# Patient Record
Sex: MALE | Race: WHITE | NOT HISPANIC OR LATINO | URBAN - METROPOLITAN AREA
[De-identification: names, ages, dates, MRNs, and addresses within clinical notes are randomized per-mention and may not be internally consistent; named-entity substitution may affect disease eponyms.]

---

## 2018-07-09 ENCOUNTER — TRANSFERRED RECORDS (OUTPATIENT)
Dept: HEALTH INFORMATION MANAGEMENT | Facility: CLINIC | Age: 58
End: 2018-07-09

## 2018-07-16 ENCOUNTER — TELEPHONE (OUTPATIENT)
Dept: NEUROLOGY | Facility: CLINIC | Age: 58
End: 2018-07-16

## 2018-07-29 PROBLEM — Z86.2: Status: ACTIVE | Noted: 2018-07-29

## 2018-07-29 PROBLEM — K21.9 ESOPHAGEAL REFLUX: Status: ACTIVE | Noted: 2018-07-29

## 2018-07-29 PROBLEM — G20.A1 PARKINSON DISEASE (H): Status: ACTIVE | Noted: 2018-07-29

## 2018-07-29 RX ORDER — BISACODYL 5 MG/1
TABLET, DELAYED RELEASE ORAL
Qty: 60 TABLET | COMMUNITY
Start: 2018-07-29 | End: 2018-08-10

## 2018-07-29 RX ORDER — ROPINIROLE 2 MG/1
TABLET, FILM COATED ORAL
COMMUNITY
Start: 2018-07-29 | End: 2018-08-17

## 2018-07-29 RX ORDER — ALPRAZOLAM 0.25 MG
TABLET ORAL
Qty: 90 TABLET | COMMUNITY
Start: 2018-07-29 | End: 2018-08-17

## 2018-08-10 RX ORDER — BISACODYL 5 MG/1
TABLET, DELAYED RELEASE ORAL
Qty: 60 TABLET | COMMUNITY
Start: 2018-08-10 | End: 2018-08-17

## 2018-08-10 RX ORDER — CARBIDOPA AND LEVODOPA 25; 100 MG/1; MG/1
TABLET ORAL
Qty: 360 TABLET | Refills: 3 | COMMUNITY
Start: 2018-08-10 | End: 2018-08-17

## 2018-08-10 NOTE — PROGRESS NOTES
Summary and Recommendations:     Parkinson's disease 56 yo right handed man with right side onset duration 12 yrs  Wearing off issues - improvement would be a goal for the left brain dbs surgery to address right body symptoms and wearing off, etc.     Has other medical issues including Von Willenbrand disease    Visit today with Rosalba Marcelino, Pharmacist - will review issues of ICD, wearing off issues with rytary, etc.     Visited with Emmanuelle Joseph RN of neurosurgery     720am with Jade Bella   10am with Dr. Cardozo   8am with Dr. Marsh neuropsychological evaluation 4 hours appointment  3T MRI scan date and time not certain -location is in the [facility] building   Hematology appointment needs to be set up.     He should get a fasting blood sugar screen for diabetes.     Chucky Warner MD  _____________________________________________________________________  PATIENT: Dion Villavicencio  57 year old male   : 1960  MARY: 2018    Consult requested by Dr. Rg        Outside records reviewed and revealed - inserted.   History obtained from patient  History of Present Illness  56 yo right handed man with parkinson's disease since     Von Willenbrand factor  Father with this condition as well as one of his two sons  No family history of parkinson    No history of head injuries  Has loss of taste about 6 months ago and then it returned.   He has wears cpap for his sleep apnea 28/30 days use for at least 4 hours.   He may not talk in his sleep  His wife talks in her sleep and had a cpap and it did not go well.   Denies problems with his eyes other than dryness of his eyes and wears glasses  He lives in [place] - he is working 10 hours per week at the [employer].  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    does not have an exercise program and is doing raking an mowing  He denies falls or balance problems  He may have freezing when he is off from his medications  He denies skin cancer   Father had skin cancer.   He denies endo problems except had slightly elevated a1c  No lung problems  Has not had low or high blood pressure.   He has the the blood disorder   msk - denies  Mood is good  Bladder is okay but has some urinary urgency. He has not had significant nocturia.   He has some problems during the day especially when he is less medicated  Denies constipation or heartburn.    Onset of parkinson marc 2006.   He had problems with lettting go of a baseball when he was trying to throw it and had wiggling of the right pinky.  Right side is more affected and both legs affected.   He had been on a medication prior to requip and had strange urges to do things. He has not had these problems with requip. He can focus on problems for a long time. He can sit at a computer for hours.   He has not had urges to eat. He has gained. Weight.   He had problems with mirapex.   He had wearing off at 4 hours and still having it with rytary and it is not as bad as before.   He has some dyskinesias when he is thinking and at the computer.   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   He has had okay balance -     He has been exposed to pesticide in [employer].     GOALS for surgery  1. To improve wearing off  - he has been limited in his social activity due to the wearing off and due to the unpredictable nature of his medications    [facility]  Seen Dr. Kirby Fierro at [facility]  Dr. Douglas العلي recommended    He has done the dbs class at [place]  He is aware of the different devices for  surgery.       Medications     7-830am 11am-1230pm 2pm 3-430pm 7pm-830pm 9pm 11pm-1230am   Alprazolam xanax 0.25mg Not taking         Amantadine symmetrel 100mg  1        Bisacodyl dulcolax 5mg Not taking         Carbidopa/levodopa sinemet 25/100 1  As needed 1    1   Carbidopa/levodopa ER rytary 145mg 4 4  4 2  2   mvi centrum  1         Ropinirole requip 2mg 1   1   1   Vitamin D3 2000 units 1                                           Answers for HPI/ROS submitted by the patient on 8/17/2018   General Symptoms: No  Skin Symptoms: No  HENT Symptoms: No  EYE SYMPTOMS: Yes  HEART SYMPTOMS: No  LUNG SYMPTOMS: No  INTESTINAL SYMPTOMS: No  URINARY SYMPTOMS: No  REPRODUCTIVE SYMPTOMS: No  SKELETAL SYMPTOMS: No  BLOOD SYMPTOMS: No  NERVOUS SYSTEM SYMPTOMS: No  MENTAL HEALTH SYMPTOMS: No  Dry eyes: Yes  Redness: Yes  Eye irritation: Yes  PHQ-2 Score: 0        14 Review of systems  are negative except for   Patient Active Problem List   Diagnosis     Hx of von Willebrand's disease     Parkinson disease (H)     Esophageal reflux        Allergies   Allergen Reactions     Penicillins      Pramipexole      No past surgical history on file.  Past Medical History:   Diagnosis Date     Esophageal reflux 7/29/2018     Hx of von Willebrand's disease 7/29/2018     Parkinson disease (H) 7/29/2018     Social History     Social History     Marital status:      Spouse name: N/A     Number of children: N/A     Years of education: N/A     Occupational History     Not on file.     Social History Main Topics     Smoking status: Not on file     Smokeless tobacco: Not on file     Alcohol use Not on file     Drug use: Not on file     Sexual activity: Not on file     Other Topics Concern     Not on file     Social History Narrative    . lives in [place]. Carmen spouse     No family history on file.  Current Outpatient Prescriptions   Medication Sig Dispense Refill     ALPRAZolam (XANAX) 0.25 MG tablet  90 tablet      bisacodyl  (DULCOLAX) 5 MG EC tablet  60 tablet      carbidopa-levodopa ER (RYTARY) 36. MG CPCR per CR capsule 4 capsules @ 7am, 11am and 3pm; and 2 capsules @ 7pm and 11pm       rOPINIRole (REQUIP) 2 MG tablet 2mg tab by mouth @ 830am, 1pm and 9pm       Examination  B/P: Data Unavailable, T: Data Unavailable, P: Data Unavailable, R: Data Unavailable 0 lbs 0 oz  There were no vitals taken for this visit., There is no height or weight on file to calculate BMI.    Vitals signs were added and reviewed if not above. Please refer to the chart from this visit.    General examination: well developed, nourished and normal affect  Carotid: No bruits. Chest CTA, Heart regular without gallops or murmurs. Abdomen soft nontender, no masses, bowel sounds intact. Periphery: normal pulses without edema. No skin lesions. MENTAL STATUS:  Alert, oriented x3.  Speech fluent with normal naming, repetition, comprehension.  Good right-left orientation, Can remember 3/3 objects.  5/5 on spelling world backwards.  CRANIAL NERVES:  Disks flat. Pupils are equal, round, reactive to light.  Normal vascularity and fields. Extraocular movements full.  Facial sensation and movement normal.  Hearing intact. Palate moves symmetrically.  Tongue midline.  Sternocleidomastoid and trapezius strength intact.  Neck strength was normal.  NEUROLOGIC:  Tone: mild increased tone with mild slowling (4 hours post dose). Motor in upper and lower extremities. 5/5.  Reflexes 2/4.  Toe signs downgoing.  Good finger-nose-finger, fine finger movement, heel-shin maneuver, sensation to light touch, position sense and vibration and temperature was normal. Gait normal except for slight reduced right arm swing. Romberg and postural stability  intact Tremor  Mild postural and action tremor.

## 2018-08-13 NOTE — TELEPHONE ENCOUNTER
FUTURE VISIT INFORMATION      FUTURE VISIT INFORMATION:    Date: 08/17/2018    Time: 10;30 am     [facility]  REFERRAL INFORMATION:    Referring provider:  Tegan Norman    Referring providers clinic:      Reason for visit/diagnosis        NOTES (FOR ALL VISITS) STATUS DETAILS   OFFICE NOTE from referring provider Internal 07/09/2018   OFFICE NOTE from other specialist N/A    DISCHARGE SUMMARY from hospital N/A    DISCHARGE REPORT from the ER N/A    OPERATIVE REPORT N/A    MEDICATION LIST Internal    IMAGING  (FOR ALL VISITS)     EMG N/A    EEG N/A    ECT N/A    MRI (HEAD, NECK, SPINE) Internal PACS  MR Brain W/WO IV Cont (03/14/2006 1:55 PM)  MR Cervical Spine W/WO IV Cont (03/14/2006 1:54 PM)     CT (HEAD, NECK, SPINE) N/A    OTHER       XR Cervical Spine 3 Views (11/03/2005 2:10 PM)

## 2018-08-14 DIAGNOSIS — G20.A1 PARKINSON'S DISEASE (H): Primary | ICD-10-CM

## 2018-08-17 ENCOUNTER — PRE VISIT (OUTPATIENT)
Dept: NEUROLOGY | Facility: CLINIC | Age: 58
End: 2018-08-17

## 2018-08-17 ENCOUNTER — OFFICE VISIT (OUTPATIENT)
Dept: PHARMACY | Facility: CLINIC | Age: 58
End: 2018-08-17
Payer: COMMERCIAL

## 2018-08-17 ENCOUNTER — OFFICE VISIT (OUTPATIENT)
Dept: NEUROLOGY | Facility: CLINIC | Age: 58
End: 2018-08-17
Payer: COMMERCIAL

## 2018-08-17 VITALS
WEIGHT: 221.6 LBS | BODY MASS INDEX: 33.59 KG/M2 | HEART RATE: 89 BPM | DIASTOLIC BLOOD PRESSURE: 88 MMHG | SYSTOLIC BLOOD PRESSURE: 122 MMHG | HEIGHT: 68 IN

## 2018-08-17 VITALS
DIASTOLIC BLOOD PRESSURE: 88 MMHG | SYSTOLIC BLOOD PRESSURE: 122 MMHG | HEIGHT: 68 IN | HEART RATE: 89 BPM | WEIGHT: 221 LBS | BODY MASS INDEX: 33.49 KG/M2

## 2018-08-17 DIAGNOSIS — G20.C PARKINSONISM, UNSPECIFIED PARKINSONISM TYPE (H): Primary | ICD-10-CM

## 2018-08-17 DIAGNOSIS — Z86.2 HX OF VON WILLEBRAND'S DISEASE: ICD-10-CM

## 2018-08-17 DIAGNOSIS — D68.00 VON WILLEBRAND'S DISEASE (H): ICD-10-CM

## 2018-08-17 DIAGNOSIS — K21.9 GASTROESOPHAGEAL REFLUX DISEASE, ESOPHAGITIS PRESENCE NOT SPECIFIED: ICD-10-CM

## 2018-08-17 DIAGNOSIS — Z78.9 TAKES DIETARY SUPPLEMENTS: ICD-10-CM

## 2018-08-17 DIAGNOSIS — G20.A1 PARKINSON DISEASE (H): ICD-10-CM

## 2018-08-17 DIAGNOSIS — G20.A1 PARKINSON'S DISEASE (H): Primary | ICD-10-CM

## 2018-08-17 PROBLEM — G47.33 OBSTRUCTIVE SLEEP APNEA ON CPAP: Status: ACTIVE | Noted: 2017-10-04

## 2018-08-17 PROCEDURE — 99605 MTMS BY PHARM NP 15 MIN: CPT | Performed by: PHARMACIST

## 2018-08-17 PROCEDURE — 99607 MTMS BY PHARM ADDL 15 MIN: CPT | Performed by: PHARMACIST

## 2018-08-17 RX ORDER — CHOLECALCIFEROL (VITAMIN D3) 50 MCG
2000 TABLET ORAL EVERY MORNING
Qty: 100 TABLET | Refills: 3 | COMMUNITY
Start: 2018-08-17

## 2018-08-17 RX ORDER — AMANTADINE HYDROCHLORIDE 100 MG/1
100 CAPSULE, GELATIN COATED ORAL
Qty: 90 CAPSULE | Refills: 3 | COMMUNITY
Start: 2018-08-17 | End: 2019-12-29

## 2018-08-17 RX ORDER — ROPINIROLE 2 MG/1
2 TABLET, FILM COATED ORAL 3 TIMES DAILY
Qty: 270 TABLET | Refills: 3 | COMMUNITY
Start: 2018-08-17 | End: 2019-12-29

## 2018-08-17 RX ORDER — MULTIVIT WITH MINERALS/LUTEIN
1 TABLET ORAL EVERY MORNING
Qty: 30 TABLET | COMMUNITY
Start: 2018-08-17

## 2018-08-17 RX ORDER — ROPINIROLE 2 MG/1
TABLET, FILM COATED ORAL
Qty: 270 TABLET | Refills: 3 | COMMUNITY
Start: 2018-08-17 | End: 2018-08-17

## 2018-08-17 RX ORDER — CARBIDOPA AND LEVODOPA 25; 100 MG/1; MG/1
1 TABLET ORAL 4 TIMES DAILY
Qty: 360 TABLET | Refills: 3 | COMMUNITY
Start: 2018-08-17 | End: 2019-08-16

## 2018-08-17 RX ORDER — BISACODYL 5 MG/1
TABLET, DELAYED RELEASE ORAL
COMMUNITY
Start: 2018-03-12 | End: 2018-08-17

## 2018-08-17 ASSESSMENT — PAIN SCALES - GENERAL: PAINLEVEL: NO PAIN (0)

## 2018-08-17 ASSESSMENT — ENCOUNTER SYMPTOMS
EYE REDNESS: 1
EYE IRRITATION: 1

## 2018-08-17 NOTE — PATIENT INSTRUCTIONS
Recommendations from today's MTM visit:                                                    MTM (medication therapy management) is a service provided by a clinical pharmacist designed to help you get the most of out of your medicines.     No medication changes today.    Next MTM visit: to be decided    To schedule another MTM appointment, please call the clinic directly or you may call the MTM scheduling line at [phone] or toll-free at [phone].     My Clinical Pharmacist's contact information:                                                      It was a pleasure seeing you today!  Please feel free to contact me with any questions or concerns you have.      Rosalba Marcelino, Pharm.D.  Medication Therapy Management Pharmacist  Phone: [phone]    You may receive a survey about the MTM services you received.  I would appreciate your feedback to help me serve you better in the future. Please fill it out and return it when you can. Your comments will be anonymous.

## 2018-08-17 NOTE — MR AVS SNAPSHOT
After Visit Summary   8/17/2018    Dion Villavicencio    MRN: [id number]           Patient Information     Date Of Birth          1960        Visit Information        Provider Department      8/17/2018 10:30 AM Chucky Warner MD [facility] Neurology        Today's Diagnoses     Parkinsonism, unspecified Parkinsonism type (H)    -  1    Hx of von Willebrand's disease        Parkinson disease (H)        Gastroesophageal reflux disease, esophagitis presence not specified          Care Instructions    Parkinson's disease 58 yo right handed man with right side onset duration 12 yrs  Wearing off issues - improvement would be a goal for the left brain dbs surgery to address right body symptoms and wearing off, etc.     Has other medical issues including Von Willenbrand disease    Visit today with Alison Bueno, Pharmacist - will review issues of ICD, wearing off issues with rytary, etc.     Visited with Emmanuelle Joseph RN of neurosurgery    27th August 720am with Jade Bella  27th August 10am with Dr. Cardozo  30th August 8am with Dr. Marsh neuropsychological evaluation 4 hours appointment  3T MRI scan date and time not certain -location is in the [facility] building   Hematology appointment needs to be set up.     He should get a fasting blood sugar screen for diabetes.     Chucky Warner MD          Follow-ups after your visit        Additional Services     MED THERAPY MANAGE REFERRAL       Your provider has referred you to: alison bueno  Reason for Referral: Parkinson    The [employer] Medication Therapy Management department will contact you to schedule an appointment.  You may also schedule the appointment by calling [phone].  For [employer] [place] patients, please call [phone] to confirm/schedule your appointment on the next business day.    This service is designed to help you get the most from your medications.  A specially trained Pharmacist will work closely with you and your providers to solve  any questions, concerns, issues or problems related to your medications.    Please bring all of your prescription and non-prescription medications (such as vitamins, over-the-counter medications, and herbals) or a detailed medication list to your appointment.    If you have a glucose meter or other home monitoring information, please also bring this to your appointment (i.e. blood glucose log, blood pressure log, pain log, etc.).            MED THERAPY MANAGE REFERRAL       Your provider has referred you to: alison bueno  Reason for Referral: Parkinson    The [facility] Medication Therapy Management department will contact you to schedule an appointment.  You may also schedule the appointment by calling [phone].  For [place] patients, please call [phone] to confirm/schedule your appointment on the next business day.    This service is designed to help you get the most from your medications.  A specially trained Pharmacist will work closely with you and your providers to solve any questions, concerns, issues or problems related to your medications.    Please bring all of your prescription and non-prescription medications (such as vitamins, over-the-counter medications, and herbals) or a detailed medication list to your appointment.    If you have a glucose meter or other home monitoring information, please also bring this to your appointment (i.e. blood glucose log, blood pressure log, pain log, etc.).            ONC/HEME ADULT REFERRAL       Your provider has referred you to: [facility]: Hematology  Perioperative management of Von Willebrand's disease for deep brain stimulation surgery    Please be aware that coverage of these services is subject to the terms and limitations of your health insurance plan.  Call member services at your health plan with any benefit or coverage questions.      Please bring the following with you to your appointment:    (1) Any X-Rays, CTs or MRIs which have been  performed.  Contact the facility where they were done to arrange for  prior to your scheduled appointment.   (2) List of current medications  (3) This referral request   (4) Any documents/labs given to you for this referral            ONC/HEME ADULT REFERRAL       Your provider has referred you to: hematology - for Von Willenbrand and dbs brain surgery for parkinson    Please be aware that coverage of these services is subject to the terms and limitations of your health insurance plan.  Call member services at your health plan with any benefit or coverage questions.      Please bring the following with you to your appointment:    (1) Any X-Rays, CTs or MRIs which have been performed.  Contact the facility where they were done to arrange for  prior to your scheduled appointment.   (2) List of current medications  (3) This referral request   (4) Any documents/labs given to you for this referral                  Your next 10 appointments already scheduled     Aug 31, 2018  1:30 PM CDT   (Arrive by 1:00 PM)   MR BRAIN W/O & W CONTRAST with [id number]   [facility] ([facility])    [address] [facility] [address]   [phone]           Take your medicines as usual, unless your doctor tells you not to. Bring a list of your current medicines to your exam (including vitamins, minerals and over-the-counter drugs).  You may or may not receive intravenous (IV) contrast for this exam pending the discretion of the Radiologist.  You do not need to do anything special to prepare.  The MRI machine uses a strong magnet. Please wear clothes without metal (snaps, zippers). A sweatsuit works well, or we may give you a hospital gown.  Please remove any body piercings and hair extensions before you arrive. You will also remove watches, jewelry, hairpins, wallets, dentures, partial dental plates and hearing aids. You may wear contact lenses, and you may be able to wear your  rings. We have a safe place to keep your personal items, but it is safer to leave them at home.  **IMPORTANT** THE INSTRUCTIONS BELOW ARE ONLY FOR THOSE PATIENTS WHO HAVE BEEN PRESCRIBED SEDATION OR GENERAL ANESTHESIA DURING THEIR MRI PROCEDURE:  IF YOUR DOCTOR PRESCRIBED ORAL SEDATION (take medicine to help you relax during your exam):   You must get the medicine from your doctor (oral medication) before you arrive. Bring the medicine to the exam. Do not take it at home. You ll be told when to take it upon arriving for your exam.   Arrive one hour early. Bring someone who can take you home after the test. Your medicine will make you sleepy. After the exam, you may not drive, take a bus or take a taxi by yourself.  IF YOUR DOCTOR PRESCRIBED IV SEDATION:   Arrive one hour early. Bring someone who can take you home after the test. Your medicine will make you sleepy. After the exam, you may not drive, take a bus or take a taxi by yourself.   No eating 6 hours before your exam. You may have clear liquids up until 4 hours before your exam. (Clear liquids include water, clear tea, black coffee and fruit juice without pulp.)  IF YOUR DOCTOR PRESCRIBED ANESTHESIA (be asleep for your exam):   Arrive 1 1/2 hours early. Bring someone who can take you home after the test. You may not drive, take a bus or take a taxi by yourself.   No eating 8 hours before your exam. You may have clear liquids up until 4 hours before your exam. (Clear liquids include water, clear tea, black coffee and fruit juice without pulp.)   You will spend four to five hours in the recovery room.  Please call the Imaging Department at your exam site with any questions.              Who to contact     Please call your clinic at [phone] to:    Ask questions about your health    Make or cancel appointments    Discuss your medicines    Learn about your test results    Speak to your doctor            Additional Information About Your Visit        Allie  "Information     [employer] gives you secure access to your electronic health record. If you see a primary care provider, you can also send messages to your care team and make appointments. If you have questions, please call your primary care clinic.  If you do not have a primary care provider, please call [phone] and they will assist you.      [employer] is an electronic gateway that provides easy, online access to your medical records. With [employer], you can request a clinic appointment, read your test results, renew a prescription or communicate with your care team.     To access your existing account, please contact your [facility] or call [phone] for assistance.        Care EveryWhere ID     This is your Care EveryWhere ID. This could be used by other organizations to access your [place] medical records  [id number]        Your Vitals Were     Pulse Height BMI (Body Mass Index)             89 5' 8.25\" (1.734 m) 33.45 kg/m2          Blood Pressure from Last 3 Encounters:   08/17/18 122/88   08/17/18 122/88    Weight from Last 3 Encounters:   08/17/18 221 lb (100.2 kg)   08/17/18 221 lb 9.6 oz (100.5 kg)              We Performed the Following     MED THERAPY MANAGE REFERRAL     MED THERAPY MANAGE REFERRAL     ONC/HEME ADULT REFERRAL     ONC/HEME ADULT REFERRAL          Today's Medication Changes          These changes are accurate as of 8/17/18 12:51 PM.  If you have any questions, ask your nurse or doctor.               Start taking these medicines.        Dose/Directions    REQUIP 2 MG tablet   Generic drug:  rOPINIRole   Started by:  Chucky Warner MD        2mg tab by mouth @ 7-830am, 3-430pm and 11pm-1230am   Quantity:  270 tablet   Refills:  3         These medicines have changed or have updated prescriptions.        Dose/Directions    * RYTARY 36. MG Cpcr per CR capsule   This may have changed:  additional instructions   Generic drug:  carbidopa-levodopa ER "   Changed by:  Chucky Warner MD        4 capsules @ 7-830am, 11am-1230p and 3-430pm; and 2 capsules @ 7-830pm and 11pm-1230am   Refills:  0       * carbidopa-levodopa  MG per tablet   Commonly known as:  SINEMET   This may have changed:  additional instructions   Used for:  Parkinsonism, unspecified Parkinsonism type (H)   Changed by:  Chucky Warner MD        25/100 tab by mouth @ 7-830am, 1pm and 11pm-1230am and extra if needed = 4/day   Quantity:  360 tablet   Refills:  3       * Notice:  This list has 2 medication(s) that are the same as other medications prescribed for you. Read the directions carefully, and ask your doctor or other care provider to review them with you.      Stop taking these medicines if you haven't already. Please contact your care team if you have questions.     ALPRAZolam 0.25 MG tablet   Commonly known as:  XANAX   Stopped by:  Chucky Warner MD           bisacodyl 5 MG EC tablet   Commonly known as:  DULCOLAX   Stopped by:  Chucky Warner MD                    Primary Care Provider Office Phone # Fax #    Ritesh Knutson [phone]       [facility] [address] [place] [address]        Equal Access to Services     [facility] AH: Hadii aviva ku hadasho Soomaali, waaxda luqadaha, qaybta kaalmada adeegyada, traci ann hayajn lila bradley . So [facility] [phone].    ATENCIÓN: Si habla español, tiene a bashir disposición servicios gratuitos de asistencia lingüística. Viv al [phone].    We comply with applicable federal civil rights laws and [place] laws. We do not discriminate on the basis of race, color, national origin, age, disability, sex, sexual orientation, or gender identity.            Thank you!     Thank you for choosing [facility] NEUROLOGY  for your care. Our goal is always to provide you with excellent care. Hearing back from our patients is one way we can continue to improve our services. Please take a  few minutes to complete the written survey that you may receive in the mail after your visit with us. Thank you!             Your Updated Medication List - Protect others around you: Learn how to safely use, store and throw away your medicines at www.disposemymeds.org.          This list is accurate as of 8/17/18 12:51 PM.  Always use your most recent med list.                   Brand Name Dispense Instructions for use Diagnosis    amantadine 100 MG capsule    SYMMETREL    90 capsule    100mg capsule by mouth daily @ 11am-1230pm    Parkinsonism, unspecified Parkinsonism type (H)       CENTRUM SILVER per tablet     30 tablet    Centrum vitamin by mouth daily @ 7-830am        REQUIP 2 MG tablet   Generic drug:  rOPINIRole     270 tablet    2mg tab by mouth @ 7-830am, 3-430pm and 11pm-1230am        * RYTARY 36. MG Cpcr per CR capsule   Generic drug:  carbidopa-levodopa ER      4 capsules @ 7-830am, 11am-1230p and 3-430pm; and 2 capsules @ 7-830pm and 11pm-1230am        * carbidopa-levodopa  MG per tablet    SINEMET    360 tablet    25/100 tab by mouth @ 7-830am, 1pm and 11pm-1230am and extra if needed = 4/day    Parkinsonism, unspecified Parkinsonism type (H)       * cholecalciferol 1000 UNIT tablet    vitamin D3     Take 2,000 Units by mouth daily        * vitamin D 2000 units tablet     100 tablet    2000 units by mouth daily @ 7-830am        * Notice:  This list has 4 medication(s) that are the same as other medications prescribed for you. Read the directions carefully, and ask your doctor or other care provider to review them with you.

## 2018-08-17 NOTE — PATIENT INSTRUCTIONS
Parkinson's disease 56 yo right handed man with right side onset duration 12 yrs  Wearing off issues - improvement would be a goal for the left brain dbs surgery to address right body symptoms and wearing off, etc.     Has other medical issues including Von Willenbrand disease    Visit today with Rosalba Marcelino, Pharmacist - will review issues of ICD, wearing off issues with rytary, etc.     Visited with Emmanuelle Joseph RN of neurosurgery    27th August 720am with Jade Bella  27th August 10am with Dr. Cardozo  30th August 8am with Dr. Marsh neuropsychological evaluation 4 hours appointment  3T MRI scan date and time not certain -location is in the [facility] building   Hematology appointment needs to be set up.     He should get a fasting blood sugar screen for diabetes.     Chucky Warner MD

## 2018-08-17 NOTE — MR AVS SNAPSHOT
After Visit Summary   8/17/2018    Dion Villavicencio    MRN: [id number]           Patient Information     Date Of Birth          1960        Visit Information        Provider Department      8/17/2018 11:30 AM Rosalba Marcelino, [facility] MTM        Care Instructions    Recommendations from today's MTM visit:                                                    MTM (medication therapy management) is a service provided by a clinical pharmacist designed to help you get the most of out of your medicines.     No medication changes today.    Next MTM visit: to be decided    To schedule another MTM appointment, please call the clinic directly or you may call the MTM scheduling line at [phone] or toll-free at [phone].     My Clinical Pharmacist's contact information:                                                      It was a pleasure seeing you today!  Please feel free to contact me with any questions or concerns you have.      Rosalba Marcelino, Pharm.D.  Medication Therapy Management Pharmacist  Phone: [phone]    You may receive a survey about the MTM services you received.  I would appreciate your feedback to help me serve you better in the future. Please fill it out and return it when you can. Your comments will be anonymous.            Follow-ups after your visit        Your next 10 appointments already scheduled     Aug 31, 2018  1:30 PM CDT   (Arrive by 1:00 PM)   MR BRAIN W/O & W CONTRAST with [id number]   [facility])    [address] [address]   [phone]           Take your medicines as usual, unless your doctor tells you not to. Bring a list of your current medicines to your exam (including vitamins, minerals and over-the-counter drugs).  You may or may not receive intravenous (IV) contrast for this exam pending the discretion of the Radiologist.  You do not need to do anything special to  prepare.  The MRI machine uses a strong magnet. Please wear clothes without metal (snaps, zippers). A sweatsuit works well, or we may give you a hospital gown.  Please remove any body piercings and hair extensions before you arrive. You will also remove watches, jewelry, hairpins, wallets, dentures, partial dental plates and hearing aids. You may wear contact lenses, and you may be able to wear your rings. We have a safe place to keep your personal items, but it is safer to leave them at home.  **IMPORTANT** THE INSTRUCTIONS BELOW ARE ONLY FOR THOSE PATIENTS WHO HAVE BEEN PRESCRIBED SEDATION OR GENERAL ANESTHESIA DURING THEIR MRI PROCEDURE:  IF YOUR DOCTOR PRESCRIBED ORAL SEDATION (take medicine to help you relax during your exam):   You must get the medicine from your doctor (oral medication) before you arrive. Bring the medicine to the exam. Do not take it at home. You ll be told when to take it upon arriving for your exam.   Arrive one hour early. Bring someone who can take you home after the test. Your medicine will make you sleepy. After the exam, you may not drive, take a bus or take a taxi by yourself.  IF YOUR DOCTOR PRESCRIBED IV SEDATION:   Arrive one hour early. Bring someone who can take you home after the test. Your medicine will make you sleepy. After the exam, you may not drive, take a bus or take a taxi by yourself.   No eating 6 hours before your exam. You may have clear liquids up until 4 hours before your exam. (Clear liquids include water, clear tea, black coffee and fruit juice without pulp.)  IF YOUR DOCTOR PRESCRIBED ANESTHESIA (be asleep for your exam):   Arrive 1 1/2 hours early. Bring someone who can take you home after the test. You may not drive, take a bus or take a taxi by yourself.   No eating 8 hours before your exam. You may have clear liquids up until 4 hours before your exam. (Clear liquids include water, clear tea, black coffee and fruit juice without pulp.)   You will spend four  "to five hours in the recovery room.  Please call the Imaging Department at your exam site with any questions.              Who to contact     If you have questions or need follow up information about today's clinic visit or your schedule please contact [facility] directly at [phone].  Normal or non-critical lab and imaging results will be communicated to you by [employer], letter or phone within 4 business days after the clinic has received the results. If you do not hear from us within 7 days, please contact the clinic through [employer] or phone. If you have a critical or abnormal lab result, we will notify you by phone as soon as possible.  Submit refill requests through [employer] or call your pharmacy and they will forward the refill request to us. Please allow 3 business days for your refill to be completed.          Additional Information About Your Visit        [employer] Information     [employer] gives you secure access to your electronic health record. If you see a primary care provider, you can also send messages to your care team and make appointments. If you have questions, please call your primary care clinic.  If you do not have a primary care provider, please call [phone] and they will assist you.        Care EveryWhere ID     This is your Care EveryWhere ID. This could be used by other organizations to access your [place] medical records  [id number]        Your Vitals Were     Pulse Height BMI (Body Mass Index)             89 5' 8.25\" (1.734 m) 33.36 kg/m2          Blood Pressure from Last 3 Encounters:   08/17/18 122/88   08/17/18 122/88    Weight from Last 3 Encounters:   08/17/18 221 lb (100.2 kg)   08/17/18 221 lb 9.6 oz (100.5 kg)              Today, you had the following     No orders found for display         Today's Medication Changes          These changes are accurate as of 8/17/18 12:51 PM.  If you have any questions, ask your nurse or doctor.               Start taking " these medicines.        Dose/Directions    REQUIP 2 MG tablet   Generic drug:  rOPINIRole   Started by:  Chucky Warner MD        2mg tab by mouth @ 7-830am, 3-430pm and 11pm-1230am   Quantity:  270 tablet   Refills:  3         These medicines have changed or have updated prescriptions.        Dose/Directions    * RYTARY 36. MG Cpcr per CR capsule   This may have changed:  additional instructions   Generic drug:  carbidopa-levodopa ER   Changed by:  Chucky Warner MD        4 capsules @ 7-830am, 11am-1230p and 3-430pm; and 2 capsules @ 7-830pm and 11pm-1230am   Refills:  0       * carbidopa-levodopa  MG per tablet   Commonly known as:  SINEMET   This may have changed:  additional instructions   Used for:  Parkinsonism, unspecified Parkinsonism type (H)   Changed by:  Chucky Warner MD        25/100 tab by mouth @ 7-830am, 1pm and 11pm-1230am and extra if needed = 4/day   Quantity:  360 tablet   Refills:  3       * Notice:  This list has 2 medication(s) that are the same as other medications prescribed for you. Read the directions carefully, and ask your doctor or other care provider to review them with you.      Stop taking these medicines if you haven't already. Please contact your care team if you have questions.     ALPRAZolam 0.25 MG tablet   Commonly known as:  XANAX   Stopped by:  Chucky Warner MD           bisacodyl 5 MG EC tablet   Commonly known as:  DULCOLAX   Stopped by:  Chucky Warner MD                    Primary Care Provider Office Phone # Fax #    Ritesh Knutson [phone]       [facility] [address] [place] [address]        Equal Access to Services     [facility] AH: Hadii aad ku hadasho Soomaali, waaxda luqadaha, qaybta kaalmada adeegyada, traci ann hayajn lila bradley ah. So [facility] [phone].    ATENCIÓN: Si habla español, tiene a bashir disposición servicios gratuitos de asistencia lingüística. Llame al  [phone].    We comply with applicable federal civil rights laws and [place] laws. We do not discriminate on the basis of race, color, national origin, age, disability, sex, sexual orientation, or gender identity.            Thank you!     Thank you for choosing [facility]  for your care. Our goal is always to provide you with excellent care. Hearing back from our patients is one way we can continue to improve our services. Please take a few minutes to complete the written survey that you may receive in the mail after your visit with us. Thank you!             Your Updated Medication List - Protect others around you: Learn how to safely use, store and throw away your medicines at www.disposemymeds.org.          This list is accurate as of 8/17/18 12:51 PM.  Always use your most recent med list.                   Brand Name Dispense Instructions for use Diagnosis    amantadine 100 MG capsule    SYMMETREL    90 capsule    100mg capsule by mouth daily @ 11am-1230pm    Parkinsonism, unspecified Parkinsonism type (H)       CENTRUM SILVER per tablet     30 tablet    Centrum vitamin by mouth daily @ 7-830am        REQUIP 2 MG tablet   Generic drug:  rOPINIRole     270 tablet    2mg tab by mouth @ 7-830am, 3-430pm and 11pm-1230am        * RYTARY 36. MG Cpcr per CR capsule   Generic drug:  carbidopa-levodopa ER      4 capsules @ 7-830am, 11am-1230p and 3-430pm; and 2 capsules @ 7-830pm and 11pm-1230am        * carbidopa-levodopa  MG per tablet    SINEMET    360 tablet    25/100 tab by mouth @ 7-830am, 1pm and 11pm-1230am and extra if needed = 4/day    Parkinsonism, unspecified Parkinsonism type (H)       * cholecalciferol 1000 UNIT tablet    vitamin D3     Take 2,000 Units by mouth daily        * vitamin D 2000 units tablet     100 tablet    2000 units by mouth daily @ 7-830am        * Notice:  This list has 4 medication(s) that are the same as other medications prescribed for you. Read  the directions carefully, and ask your doctor or other care provider to review them with you.

## 2018-08-17 NOTE — LETTER
2018       RE: Dion Villavicencio  [address]  [place] [address]     Dear Colleague,    Thank you for referring your patient, Dion Villavicencio, to the [facility]. Please see a copy of my visit note below.    Summary and Recommendations:     Parkinson's disease 58 yo right handed man with right side onset duration 12 yrs  Wearing off issues - improvement would be a goal for the left brain dbs surgery to address right body symptoms and wearing off, etc.     Has other medical issues including Von Willenbrand disease    Visit today with Rosalba Marcelino, Pharmacist - will review issues of ICD, wearing off issues with rytary, etc.     Visited with Emmanuelle Joseph RN of neurosurgery     720am with Jade Bella   10am with Dr. Cardozo   8am with Dr. Marsh neuropsychological evaluation 4 hours appointment  3T MRI scan date and time not certain -location is in the [facility] building   Hematology appointment needs to be set up.     He should get a fasting blood sugar screen for diabetes.     Chucky Warner MD  _____________________________________________________________________  PATIENT: Dion Villavicencio  57 year old male   : 1960  MARY: 2018    Consult requested by Dr. Rg        Outside records reviewed and revealed - inserted.   History obtained from patient  History of Present Illness  58 yo right handed man with parkinson's disease since     Von Willenbrand factor  Father with this condition as well as one of his two sons  No family history of parkinson    No history of head injuries  Has loss of taste about 6 months ago and then it returned.   He has wears cpap for his sleep apnea 28/30 days use for at least 4 hours.   He may not talk in his sleep  His wife talks in her sleep and had a cpap and it did not go well.   Denies problems with his eyes other than dryness of his eyes and wears glasses  He lives in [place] - he is  working 10 hours per week at the [employer].  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   eh does not have an exercise program and is doing raking an mowing  He denies falls or balance problems  He may have freezing when he is off from his medications  He denies skin cancer   Father had skin cancer.   He denies endo problems except had slightly elevated a1c  No lung problems  Has not had low or high blood pressure.   He has the the blood disorder   msk - denies  Mood is good  Bladder is okay but has some urinary urgency. He has not had significant nocturia.   He has some problems during the day especially when he is less medicated  Denies constipation or heartburn.    Onset of parkinson [place] 2006.   He had problems with lettting go of a baseball when he was trying to throw it and had wiggling of the right pinky.  Right side is more affected and both legs affected.   He had been on a medication prior to requip and had strange urges to do things. He has not had these problems with requip. He can focus on problems for a long time. He can sit at a computer for hours.   He has not had urges to eat. He has gained. Weight.   He had problems with mirapex.   He had wearing off at 4 hours and still having it with rytary and it is not as bad as before.   He has some dyskinesias when he is thinking and at the computer.   He has not done therapy for his parkinson  He has had voice changes - leads a parkinson group at [facility]. He was in theatre and choir and there are changes in his voice when his medication is wearing off.   He has had okay balance -     He has been exposed to pesticide in [place].     GOALS for surgery  1. To improve wearing off  - he has been limited in his social activity due to the wearing off and due to the  unpredictable nature of his medications    [facility].  Seen Dr. Kirby Fierro at [facility]  Dr. Douglas العلي recommended    He has done the dbs class at [place]  He is aware of the different devices for surgery.       Medications     7-830am 11am-1230pm 2pm 3-430pm 7pm-830pm 9pm 11pm-1230am   Alprazolam xanax 0.25mg Not taking         Amantadine symmetrel 100mg  1        Bisacodyl dulcolax 5mg Not taking         Carbidopa/levodopa sinemet 25/100 1  As needed 1    1   Carbidopa/levodopa ER rytary 145mg 4 4  4 2  2   mvi centrum  1         Ropinirole requip 2mg 1   1   1   Vitamin D3 2000 units 1                                           Answers for HPI/ROS submitted by the patient on 8/17/2018   General Symptoms: No  Skin Symptoms: No  HENT Symptoms: No  EYE SYMPTOMS: Yes  HEART SYMPTOMS: No  LUNG SYMPTOMS: No  INTESTINAL SYMPTOMS: No  URINARY SYMPTOMS: No  REPRODUCTIVE SYMPTOMS: No  SKELETAL SYMPTOMS: No  BLOOD SYMPTOMS: No  NERVOUS SYSTEM SYMPTOMS: No  MENTAL HEALTH SYMPTOMS: No  Dry eyes: Yes  Redness: Yes  Eye irritation: Yes  PHQ-2 Score: 0        14 Review of systems  are negative except for   Patient Active Problem List   Diagnosis     Hx of von Willebrand's disease     Parkinson disease (H)     Esophageal reflux        Allergies   Allergen Reactions     Penicillins      Pramipexole      No past surgical history on file.  Past Medical History:   Diagnosis Date     Esophageal reflux 7/29/2018     Hx of von Willebrand's disease 7/29/2018     Parkinson disease (H) 7/29/2018     Social History     Social History     Marital status:      Spouse name: N/A     Number of children: N/A     Years of education: N/A     Occupational History     Not on file.     Social History Main Topics     Smoking status: Not on file     Smokeless tobacco: Not on file     Alcohol use Not on file     Drug use: Not on file     Sexual activity: Not on file     Other  Topics Concern     Not on file     Social History Narrative    [place]. [place]en spouse     No family history on file.  Current Outpatient Prescriptions   Medication Sig Dispense Refill     ALPRAZolam (XANAX) 0.25 MG tablet  90 tablet      bisacodyl (DULCOLAX) 5 MG EC tablet  60 tablet      carbidopa-levodopa ER (RYTARY) 36. MG CPCR per CR capsule 4 capsules @ 7am, 11am and 3pm; and 2 capsules @ 7pm and 11pm       rOPINIRole (REQUIP) 2 MG tablet 2mg tab by mouth @ 830am, 1pm and 9pm       Examination  B/P: Data Unavailable, T: Data Unavailable, P: Data Unavailable, R: Data Unavailable 0 lbs 0 oz  There were no vitals taken for this visit., There is no height or weight on file to calculate BMI.    Vitals signs were added and reviewed if not above. Please refer to the chart from this visit.    General examination: well developed, nourished and normal affect  Carotid: No bruits. Chest CTA, Heart regular without gallops or murmurs. Abdomen soft nontender, no masses, bowel sounds intact. Periphery: normal pulses without edema. No skin lesions. MENTAL STATUS:  Alert, oriented x3.  Speech fluent with normal naming, repetition, comprehension.  Good right-left orientation, Can remember 3/3 objects.  5/5 on spelling world backwards.  CRANIAL NERVES:  Disks flat. Pupils are equal, round, reactive to light.  Normal vascularity and fields. Extraocular movements full.  Facial sensation and movement normal.  Hearing intact. Palate moves symmetrically.  Tongue midline.  Sternocleidomastoid and trapezius strength intact.  Neck strength was normal.  NEUROLOGIC:  Tone: mild increased tone with mild slowling (4 hours post dose). Motor in upper and lower extremities. 5/5.  Reflexes 2/4.  Toe signs downgoing.  Good finger-nose-finger, fine finger movement, heel-shin maneuver, sensation to light touch, position sense and vibration and temperature was normal. Gait normal except for slight reduced right arm swing.  Romberg and postural stability  intact Tremor  Mild postural and action tremor.     Again, thank you for allowing me to participate in the care of your patient.      Sincerely,    Chucky Warner MD

## 2018-08-17 NOTE — PROGRESS NOTES
"SUBJECTIVE/OBJECTIVE:                           Dion Villavicencio is a 57 year old male coming in for an initial visit for Medication Therapy Management.  He was referred to me from Dr. Warner. Patient is also establishing care with Dr. Warner today for deep brain stimulation work up.    Chief Complaint: Initial MTM visit - medication review prior to DBS surgery    Allergies/ADRs: Reviewed in Epic  Tobacco: No tobacco use  Alcohol: never used  Caffeine: no caffeine - drinks Sprite  Activity: yard work; doesn't trust his feet on an Elliptical  PMH: Reviewed in Epic    Medication Adherence/Access: no issues reported    Parkinson's Disease:  Current medications include: carbidopa-levodopa, amantadine, and ropinirole (see approx schedule below). Pt reports that symptoms of PD are worsened. Current PD symptoms include: Tremor and Akinesia/bradykinesia. Medication onset is typically 45 minutes. \"Off\" time typically begins 4-4.5 hours after taking Rytary, although he describes that this is really a \"down\" time not a complete \"off\" time. Therapies tried and response: pramipexole caused compulsive behaviors at work and leg swelling.  Patient states that he was very stable on his Parkinson's medications for 11 years until last August.  Since then, he has had difficulty in predicting the response he will have to the medication.  He states that the most difficult part of this is planning his activities around how his medications are going to work. Patient reports that he takes his medications one hour prior to a large meal.  Patient started amantadine in early July; it was added to help \"smooth over\" the effects of the other medications, per patient.  He has noticed a slight improvement in his symptoms since starting amantadine. Patient denies dyskinesias, hallucinations, or sleep interferences with his medications. He has been sleeping well with the CPAP machine. Of note, patient does endorse having some compulsive behaviors at work " "still.  He states that he works long hours in order to perfect his work.  His wife states that he has excessive computer use, but this has improved since discontinuing pramipexole. Patient follows with Dr. England in neurology at [place], but is being seen at the [facility] because of his Von Willebrand's Disease.   7-8:30 am 11am- 12:30 pm 2 pm 3-4:30 pm 7:30-  8:30 pm 11pm- 12:30 am   Rytary 145 mg 4 4  4 2 2   C/L IR 25/100 mg 1  1      Amantadine 100 mg  1       Ropinirole 2 mg 1   1       Von Willebrand's Disease: No current medications. Patient reports that he bleeds and bruises excessively. His wisdom teeth removal historically was the \"worst.\" He states that he's never taken a factor replacement. Per lab work in [facility] on 6/6/18, his factor 8 level was 33, Von Willebrand Antigen was 15, and Von Willebrand Factor was < 20.    Vitamins/supplements: Currently taking Vitamin D3 2000 units daily (for \"muscle cramping) and a daily MVI. No vitamin D levels are available.    Today's Vitals: /88  Pulse 89  Ht 1.734 m (5' 8.25\")  Wt 100.2 kg (221 lb)  BMI 33.36 kg/m2      ASSESSMENT:                             Current medications were reviewed today.     Medication Adherence: excellent, no issues identified    Parkinson's Disease: Needs Improvement. Discussed extensively the patient's current carbidopa-levodopa regimen and there may be ways to optimize his medication regimen, but because of his compulsive behaviors it may be difficult to increase the Rytary or ropinirole without worsening these behaviors or tendencies. Therefore, patient will be undergoing the deep brain stimulation work up at the [facility].    Von Willebrand's Disease: Appears stable. May need to discuss using clotting factors during deep brain stimulation if the surgery is pursued.    Vitamins/supplements: Stable.       PLAN:                            No medication changes at this time.    I spent 45 minutes with this patient today. " All changes were made via collaborative practice agreement with Dr. Warner. A copy of the visit note was provided to the patient's referring provider.    Will follow up as needed.    The patient was given a summary of these recommendations as an after visit summary.     Rosalba Marcelino, Pharm.D.  Medication Therapy Management Pharmacist  Phone: [phone]

## 2018-08-17 NOTE — Clinical Note
2018       RE: Dion Villavicencio  [address] [facility] [address]     Dear Colleague,    Thank you for referring your patient, Dion Villavicencio, to the [facility] NEUROLOGY at [facility]. Please see a copy of my visit note below.    Summary and Recommendations:         Chucky Warner MD  _____________________________________________________________________  PATIENT: Dion Villavicencio  57 year old male   : 1960  MARY: 2018    Consult requested by Dr. Rg        Outside records reviewed and revealed - inserted.       History obtained from patient      History of Present Illness  58 yo man with parkinson's disease  Von Willenbrand factor      Medications     7am 830am 11am 1pm 3pm 7pm 9pm 11pm   Alprazolam xanax 0.25mg           Bisacodyl dulcolax 5mg prn          Carbidopa/levodopa sinemet 25/100 4/day prn          Carbidopa/levodopa ER rytary 145mg 4  4  4 2  2   Ropinirole requip 2mg  1  1   1                                                                                                                                                                                                                           14 Review of systems  are negative except for   Patient Active Problem List   Diagnosis     Hx of von Willebrand's disease     Parkinson disease (H)     Esophageal reflux        Allergies   Allergen Reactions     Penicillins      Pramipexole      No past surgical history on file.  Past Medical History:   Diagnosis Date     Esophageal reflux 2018     Hx of von Willebrand's disease 2018     Parkinson disease (H) 2018     Social History     Social History     Marital status:      Spouse name: N/A     Number of children: N/A     Years of education: N/A     Occupational History     Not on file.     Social History Main Topics     Smoking status: Not on file     Smokeless tobacco: Not on file     Alcohol use Not on file     Drug use: Not on file      Sexual activity: Not on file     Other Topics Concern     Not on file     Social History Narrative    . lives in [place]. Carmen spouse     No family history on file.  Current Outpatient Prescriptions   Medication Sig Dispense Refill     ALPRAZolam (XANAX) 0.25 MG tablet  90 tablet      bisacodyl (DULCOLAX) 5 MG EC tablet  60 tablet      carbidopa-levodopa ER (RYTARY) 36. MG CPCR per CR capsule 4 capsules @ 7am, 11am and 3pm; and 2 capsules @ 7pm and 11pm       rOPINIRole (REQUIP) 2 MG tablet 2mg tab by mouth @ 830am, 1pm and 9pm       Examination  B/P: Data Unavailable, T: Data Unavailable, P: Data Unavailable, R: Data Unavailable 0 lbs 0 oz  There were no vitals taken for this visit., There is no height or weight on file to calculate BMI.    Vitals signs were added and reviewed if not above. Please refer to the chart from this visit.    General examination: well developed, nourished and normal affect  Carotid: No bruits. Chest CTA, Heart regular without gallops or murmurs. Abdomen soft nontender, no masses, bowel sounds intact. Periphery: normal pulses without edema. No skin lesions. MENTAL STATUS:  Alert, oriented x3.  Speech fluent with normal naming, repetition, comprehension.  Good right-left orientation, Can remember ***/3 objects.   CRANIAL NERVES:  Disks flat. Pupils are equal, round, reactive to light.  Normal vascularity and fields. Extraocular movements full.  Facial sensation and movement normal.  Hearing intact. Palate moves symmetrically.  Tongue midline.  Sternocleidomastoid and trapezius strength intact.  Neck strength was normal.  NEUROLOGIC:  Tone: ***. Motor in upper and lower extremities. 5/5.  Reflexes 2/4.  Toe signs downgoing.  Good finger-nose-finger, fine finger movement, heel-shin maneuver, sensation to light touch, position sense and vibration and temperature was normal. Gait normal except for ***. Romberg and postural stability *** Tremor ***            Again, thank you for  allowing me to participate in the care of your patient.      Sincerely,    Chucky Warner MD

## 2018-08-20 ENCOUNTER — TELEPHONE (OUTPATIENT)
Dept: NEUROSURGERY | Facility: CLINIC | Age: 58
End: 2018-08-20

## 2018-08-20 NOTE — TELEPHONE ENCOUNTER
[facility] Call Center    Phone Message    May a detailed message be left on voicemail: yes    Reason for Call: Other: Pt calling asking for a call back, Pt not sure if he should stop medication prior to 8/27 apt. Pt has some additional medical questions. Please return call.      Action Taken: Message routed to:  [facility] ([facility]

## 2018-08-21 NOTE — TELEPHONE ENCOUNTER
Pt called asking about appts next week and instructions for appointment.    Reviewed with patient the following:  Deep Brain Stimulation (DBS) Appointment Schedule     8/27/2018 -  Motor Testing   Time: 7:20 a.m.   Clinic Location: [address] [address], [phone]   Consultation with Jade Bella CNP, DIETER   Appointment is 2 hours     * 3 days before this appointment: Stop taking Amantadine   *12 hours before this appointment: Stop taking all formulations of carbidopa-levodopa (Sinemet and Rytary) and ropinirole (Requip).   * Bring these medications with you to the appointment.   *Please do not eat breakfast foods that have significant protein the morning of the appointment.         8/27/2018 - Neurosurgery   Time: 10:00 a.m.   Clinic Location: [address] [address], [phone]   Consultation with Dr. Sidney Cardozo, Neurosurgeon   Appointment is 1 hour       8/30/2018 - Neuropsychological Evaluation   Time: 8:00 a.m.   Clinic Location: [address] [address], [phone]   Consultation with Dr. Mary Marsh, Neuropsychologist   Appointment is 4 hours       8/31/2018 - Brain MRI with/without Contrast   Time: 1:30 p.m. (please arrive at 1:00 p.m.)   Clinic Location: [facility] ([facility]),   [address] [facility], [facility] [address], on the [facility], close to the [facility] [address]. [phone]. Juliane for free right next to the door on [address]   Scan is 1.5 hours      Reviewed each appointment location/time/medication directions in detail.  Pt teach back method completed.  Voices understanding.  Pt has my phone number if needed this week.

## 2018-08-27 ENCOUNTER — OFFICE VISIT (OUTPATIENT)
Dept: NEUROSURGERY | Facility: CLINIC | Age: 58
End: 2018-08-27
Payer: COMMERCIAL

## 2018-08-27 ENCOUNTER — TELEPHONE (OUTPATIENT)
Dept: NEUROSURGERY | Facility: CLINIC | Age: 58
End: 2018-08-27

## 2018-08-27 ENCOUNTER — OFFICE VISIT (OUTPATIENT)
Dept: NEUROLOGY | Facility: CLINIC | Age: 58
End: 2018-08-27
Payer: COMMERCIAL

## 2018-08-27 VITALS
BODY MASS INDEX: 33.65 KG/M2 | TEMPERATURE: 98.3 F | RESPIRATION RATE: 18 BRPM | HEART RATE: 95 BPM | SYSTOLIC BLOOD PRESSURE: 148 MMHG | HEIGHT: 68 IN | DIASTOLIC BLOOD PRESSURE: 100 MMHG | OXYGEN SATURATION: 95 % | WEIGHT: 222 LBS

## 2018-08-27 VITALS
WEIGHT: 222.4 LBS | DIASTOLIC BLOOD PRESSURE: 100 MMHG | OXYGEN SATURATION: 95 % | RESPIRATION RATE: 18 BRPM | HEART RATE: 95 BPM | HEIGHT: 68 IN | BODY MASS INDEX: 33.71 KG/M2 | SYSTOLIC BLOOD PRESSURE: 148 MMHG | TEMPERATURE: 98.3 F

## 2018-08-27 DIAGNOSIS — D68.00 VON WILLEBRAND'S DISEASE (H): ICD-10-CM

## 2018-08-27 DIAGNOSIS — G20.A1 PARKINSON'S DISEASE (H): Primary | ICD-10-CM

## 2018-08-27 ASSESSMENT — UNIFIED PARKINSONS DISEASE RATING SCALE (UPDRS)
FINGER_TAPPING_RIGHT: MILD: ANY OF THE FOLLOWING: A) 3 TO 5 INTERRUPTIONS DURING TAPPING B) MILD SLOWING C) THE AMPLITUDE DECREMENTS MIDWAY IN THE 10-MOVEMENT SEQUENCE
AMPLITUDE_LLE: NORMAL: NO TREMOR.
SPEECH: SLIGHT: LOSS OF MODULATION, DICTION OR VOLUME, BUT STILL ALL WORDS EASY TO UNDERSTAND.
LEG_AGILITY_LEFT: MILD: ANY OF THE FOLLOWING: A) 3 TO 5 INTERRUPTIONS DURING TAPPING B) MILD SLOWING C) THE AMPLITUDE DECREMENTS MIDWAY IN THE 10-MOVEMENT SEQUENCE
AXIAL_SCORE: 8
POSTURAL_STABILITY: NORMAL:  RECOVERS WITH ONE OR TWO STEPS.
RIGIDITY_RLE: MODERATE: RIGIDITY DETECTED WITHOUT THE ACTIVATION MANEUVER. FULL RANGE OF MOTION IS ACHIEVED WITH EFFORT.
RIGIDITY_NECK: NORMAL
POSTURAL_STABILITY: NORMAL:  RECOVERS WITH ONE OR TWO STEPS.
SPEECH: SLIGHT: LOSS OF MODULATION, DICTION OR VOLUME, BUT STILL ALL WORDS EASY TO UNDERSTAND.
PRONATION_SUPINATION_LEFT: MILD: ANY OF THE FOLLOWING: A) 3 TO 5 INTERRUPTIONS DURING TAPPING B) MILD SLOWING C) THE AMPLITUDE DECREMENTS MIDWAY IN THE 10-MOVEMENT SEQUENCE
SPONTANEITY_OF_MOVEMENT: 1: SLIGHT: SLIGHT GLOBAL SLOWNESS AND POVERTY OF SPONTANEOUS MOVEMENTS.
FINGER_TAPPING_RIGHT: MILD: ANY OF THE FOLLOWING: A) 3 TO 5 INTERRUPTIONS DURING TAPPING B) MILD SLOWING C) THE AMPLITUDE DECREMENTS MIDWAY IN THE 10-MOVEMENT SEQUENCE
AMPLITUDE_RLE: NORMAL: NO TREMOR.
AMPLITUDE_LIP_JAW: NORMAL: NO TREMOR.
RIGIDITY_LUE: MILD: RIGIDITY DETECTED WITHOUT THE ACTIVATION MANEUVER.  FULL RANGE OF MOTION IS EASILY ACHIEVED.
TOTAL_SCORE_LEFT: 19
CONSTANCY_TREMOR_ATREST: NORMAL: NO TREMOR.
PARKINSONS_MEDS: ON
TOETAPPING_RIGHT: MILD: ANY OF THE FOLLOWING: A) 3 TO 5 INTERRUPTIONS DURING TAPPING B) MILD SLOWING C) THE AMPLITUDE DECREMENTS MIDWAY IN THE 10-MOVEMENT SEQUENCE
HANDMOVEMENTS_RIGHT: MODERATE: ANY OF THE FOLLOWING:  A) MORE THAN 5 INTERRUPTIONS  OR AT LEAST ONE LONGER ARREST (FREEZE) IN ONGOING MOVEMENT  B) MODERATE SLOWING C) THE AMPLITUDE DECREMENTS STARTING AFTER THE FIRST MOVEMENT.
FREEZING_GAIT: SLIGHT:  FREEZES ON STARTING, TURNING, OR WALKING THROUGH DOOWAY WITH A SINGLE HALT DURING ANY OF THESE EVENTS, BUT THEN CONTINUES SMOOTHLY WITHOUT FREEZING DURING STRAIGHT WALKING.
PRONATION_SUPINATION_RIGHT: MILD: ANY OF THE FOLLOWING: A) 3 TO 5 INTERRUPTIONS DURING TAPPING B) MILD SLOWING C) THE AMPLITUDE DECREMENTS MIDWAY IN THE 10-MOVEMENT SEQUENCE
AMPLITUDE_LIP_JAW: NORMAL: NO TREMOR.
HANDMOVEMENTS_LEFT: MILD: ANY OF THE FOLLOWING: A) 3 TO 5 INTERRUPTIONS DURING TAPPING B) MILD SLOWING C) THE AMPLITUDE DECREMENTS MIDWAY IN THE 10-MOVEMENT SEQUENCE
RIGIDITY_RUE: SLIGHT: RIGIDITY ONLY DETECTED WITH ACTIVATION MANEUVER.
FACIAL_EXPRESSION: SLIGHT: MINIMAL MASKED FACIES MANIFESTED ONLY BY DECREASED FREQUENCY OF BLINKING.
ARISING_CHAIR: NORMAL: ABLE TO ARISE QUICKLY WITHOUT HESITATION.
TOETAPPING_RIGHT: MILD: ANY OF THE FOLLOWING: A) 3 TO 5 INTERRUPTIONS DURING TAPPING B) MILD SLOWING C) THE AMPLITUDE DECREMENTS MIDWAY IN THE 10-MOVEMENT SEQUENCE
TOETAPPING_LEFT: MODERATE: ANY OF THE FOLLOWING:  A) MORE THAN 5 INTERRUPTIONS OR AT LEAST ONE LONGER ARREST (FREEZE) IN ONGOING MOVEMENT  B) MODERATE SLOWING C) THE AMPLITUDE DECREMENTS STARTING AFTER THE FIRST MOVEMENT.
TOTAL_SCORE_LEFT: 12
CONSTANCY_TREMOR_ATREST: MODERATE: TREMOR AT REST IS PRESENT 51-75% OF THE ENTIRE EXAMINATION PERIOD.
RIGIDITY_LUE: MILD: RIGIDITY DETECTED WITHOUT THE ACTIVATION MANEUVER.  FULL RANGE OF MOTION IS EASILY ACHIEVED.
TOTAL_SCORE: 11
FACIAL_EXPRESSION: SLIGHT: MINIMAL MASKED FACIES MANIFESTED ONLY BY DECREASED FREQUENCY OF BLINKING.
LEG_AGILITY_RIGHT: MILD: ANY OF THE FOLLOWING: A) 3 TO 5 INTERRUPTIONS DURING TAPPING B) MILD SLOWING C) THE AMPLITUDE DECREMENTS MIDWAY IN THE 10-MOVEMENT SEQUENCE
GAIT: SLIGHT: INDEPENDENT WALKING WITH MINOR GAIT IMPAIRMENT.
RIGIDITY_LLE: SLIGHT: RIGIDITY ONLY DETECTED WITH ACTIVATION MANEUVER.
RIGIDITY_LLE: MODERATE: RIGIDITY DETECTED WITHOUT THE ACTIVATION MANEUVER. FULL RANGE OF MOTION IS ACHIEVED WITH EFFORT.
RIGIDITY_RLE: SLIGHT: RIGIDITY ONLY DETECTED WITH ACTIVATION MANEUVER.
TOTAL_SCORE: 22
TOTAL_SCORE: 29
AMPLITUDE_RUE: SLIGHT: < 1 CM IN MAXIMAL AMPLITUDE.
FINGER_TAPPING_LEFT: MILD: ANY OF THE FOLLOWING: A) 3 TO 5 INTERRUPTIONS DURING TAPPING B) MILD SLOWING C) THE AMPLITUDE DECREMENTS MIDWAY IN THE 10-MOVEMENT SEQUENCE
HANDMOVEMENTS_LEFT: NORMAL
RIGIDITY_NECK: SLIGHT: RIGIDITY ONLY DETECTED WITH ACTIVATION MANEUVER.
LEG_AGILITY_LEFT: MODERATE: ANY OF THE FOLLOWING:  A) MORE THAN 5 INTERRUPTIONS  OR AT LEAST ONE LONGER ARREST (FREEZE) IN ONGOING MOVEMENT  B) MODERATE SLOWING C) THE AMPLITUDE DECREMENTS STARTING AFTER THE FIRST MOVEMENT.
ARISING_CHAIR: SLIGHT: ARISING IS SLOWER THAN NORMAL, OR MAY NEED MORE THAN ONE ATTEMPT, OR MAY NEED TO MOVE FORWARD IN THE CHAIR TO ARISE.  NO NEED TO USE THE ARMS OF THE CHAIR.
FINGER_TAPPING_LEFT: MILD: ANY OF THE FOLLOWING: A) 3 TO 5 INTERRUPTIONS DURING TAPPING B) MILD SLOWING C) THE AMPLITUDE DECREMENTS MIDWAY IN THE 10-MOVEMENT SEQUENCE
PRONATION_SUPINATION_RIGHT: MODERATE: ANY OF THE FOLLOWING:  A) MORE THAN 5 INTERRUPTIONS  OR AT LEAST ONE LONGER ARREST (FREEZE) IN ONGOING MOVEMENT  B) MODERATE SLOWING C) THE AMPLITUDE DECREMENTS STARTING AFTER THE FIRST MOVEMENT.
POSTURE: 2 MILD.  DEFINITE FLEXION, SCOLIOSIS OR LEANING OT ONE SIDE, BUT CAN CORRECT POSTURE TO NORMAL WHEN ASKED.
PRONATION_SUPINATION_LEFT: MILD: ANY OF THE FOLLOWING: A) 3 TO 5 INTERRUPTIONS DURING TAPPING B) MILD SLOWING C) THE AMPLITUDE DECREMENTS MIDWAY IN THE 10-MOVEMENT SEQUENCE
TOETAPPING_LEFT: MODERATE: ANY OF THE FOLLOWING:  A) MORE THAN 5 INTERRUPTIONS OR AT LEAST ONE LONGER ARREST (FREEZE) IN ONGOING MOVEMENT  B) MODERATE SLOWING C) THE AMPLITUDE DECREMENTS STARTING AFTER THE FIRST MOVEMENT.
RIGIDITY_RUE: MILD: RIGIDITY DETECTED WITHOUT THE ACTIVATION MANEUVER.  FULL RANGE OF MOTION IS EASILY ACHIEVED.
GAIT: NORMAL
PARKINSONS_MEDS: OFF
AMPLITUDE_RUE: SLIGHT: < 1 CM IN MAXIMAL AMPLITUDE.
AMPLITUDE_LLE: SLIGHT: < 1 CM IN MAXIMAL AMPLITUDE.
LEG_AGILITY_RIGHT: SLIGHT: ANY OF THE FOLLOWING: A) THE REGULAR RHYTHM IS BROKEN WITH ONE WITH ONE OR TWO INTERRUPTIONS OR HESITATIONS OF THE MOVEMENT B) SLIGHT SLOWING C) THE AMPLITUDE DECREMENTS NEAR THE END OF THE 10 MOVEMENTS.
SPONTANEITY_OF_MOVEMENT: 1: SLIGHT: SLIGHT GLOBAL SLOWNESS AND POVERTY OF SPONTANEOUS MOVEMENTS.
AMPLITUDE_LUE: NORMAL: NO TREMOR.
AMPLITUDE_LUE: NORMAL: NO TREMOR.
TOTAL_SCORE: 52
FREEZING_GAIT: NORMAL
HANDMOVEMENTS_RIGHT: NORMAL
POSTURE: 2 MILD.  DEFINITE FLEXION, SCOLIOSIS OR LEANING OT ONE SIDE, BUT CAN CORRECT POSTURE TO NORMAL WHEN ASKED.
AMPLITUDE_RLE: SLIGHT: < 1 CM IN MAXIMAL AMPLITUDE.
AXIAL_SCORE: 6

## 2018-08-27 ASSESSMENT — PAIN SCALES - GENERAL
PAINLEVEL: NO PAIN (0)
PAINLEVEL: NO PAIN (0)

## 2018-08-27 NOTE — PATIENT INSTRUCTIONS
__  You have completed the ON/OFF Motor Assessment.    __  Continue with your DBS workup appointments.  __  Once you're done with your workup, we'll discuss you at the DBS Consensus meeting & will let you know the decision.  __  You may contact us with any question.

## 2018-08-27 NOTE — NURSING NOTE
Chief Complaint   Patient presents with     Parkinson     [facility] RETURN MOTOR TESTING ON/OFF MOTOR TESTING      Greg Oseguera, EMT

## 2018-08-27 NOTE — PROGRESS NOTES
"HISTORY AND PHYSICAL EXAM    Chief Complaint   Patient presents with     Consult     [facility] DEEP BRAIN STIMULATION, CONSULTATION. Parkinson's disease. Von Willebrand disease       HISTORY OF PRESENT ILLNESS  Mr. Dion Villavicencio is seen in the Neurosurgery Clinic for DBS candidacy evaluation.  He is a 57 year old right-handed male with diagnosis of Parkinson's disease. He was diagnosed with Idiopathic Parkinson's disease in 2006 by Dr. Hellen England who still sees him.  His symptoms started in 2006 when he found that it was difficult for him to let go of a baseball while throwing it.  Because of this, the baseball would end up being thrown straight down because he was not letting go of it soon enough.  He also noticed shaking in his right hand, especially during typing.  He has now noticed that his tremors are worse which has spread to both feet and his left hand.  He has adapted using his left hand while maneuvering a mouse when using a computer.  He has difficulty with eating and buttoning his clothes, but he can still complete these tasks without assistance.  He also reports being unsteady when standing, with his eyes closed.  No falls have been reported.  He feels that his social life is now limited due to having to coordinate his medications and meals.  His goal for DBS are to control his shaking in his hands and both of his feet.  It is difficult for him to drive with the shakes.  He wants to live a more \"normal life\" as he has unpredictable OFF times.  He would also like to use less medications.  He would like his right side treated first, as he is right hand dominant, and eventually the left side.     Of note, he has a diagnosis of von Willebrand's disease.  His father and son have it as well.  When his son was in the hospital, the diagnosis was made which led to his diagnosis.  He does report that he bruises easily and once when he needed stitches for his face, his entire face was bruised due to bleeding.  He was " being considered for DBS with Dr. England and her team.  However, his hematologists suggested getting his DBS surgery at the [facility] with close coordination of perioperative plan for the von Willebrand's disease with Dr. Douglas Riley, hematologist at the [facility].    Because of the von Willebrand's disease and the perioperative care the patient may need, Dr. England referred the patient for the evaluation and possible surgical intervention, if he is a candidate.      Past Medical History:   Diagnosis Date     Esophageal reflux 7/29/2018     Hx of von Willebrand's disease 7/29/2018     Parkinson disease (H) 7/29/2018       Past Surgical History:   Procedure Laterality Date     C ORAL SURGERY PROCEDURE  1986    wisdom teeth surgery       Family History   Problem Relation Age of Onset     Eye Disorder Mother      dry eyes ? glaucoma     Diabetes Father      Blood Disease Father      von willendbrand     Skin Cancer Father      Nephrolithiasis Brother      Nephrolithiasis Brother      Blood Disease Son      von willendbrand       Social History     Social History     Marital status:      Spouse name: N/A     Number of children: N/A     Years of education: N/A     Occupational History     Not on file.     Social History Main Topics     Smoking status: Never Smoker     Smokeless tobacco: Never Used     Alcohol use No     Drug use: Not on file     Sexual activity: Not on file     Other Topics Concern     Not on file     Social History Narrative    . lives in [place]. Carmen spouse    Born in [facility]    Grew up in [place] till moved to [place] and grew up there from age 7 yrs.     No family history     to [place] since 1987              Allergies   Allergen Reactions     Penicillins      Hives     Pramipexole      Leg swelling and compulsive behavior       Current Outpatient Prescriptions   Medication     amantadine (SYMMETREL) 100 MG capsule     carbidopa-levodopa (SINEMET)  MG per tablet  "    carbidopa-levodopa ER (RYTARY) 36. MG CPCR per CR capsule     Cholecalciferol (VITAMIN D) 2000 units tablet     Multiple Vitamins-Minerals (CENTRUM SILVER) per tablet     rOPINIRole (REQUIP) 2 MG tablet     No current facility-administered medications for this visit.          REVIEW OF SYSTEMS:  Answers for HPI/ROS submitted by the patient on 8/25/2018 - also per HPI.   General Symptoms: No  Skin Symptoms: No  HENT Symptoms: No  EYE SYMPTOMS: No  HEART SYMPTOMS: No  LUNG SYMPTOMS: No  INTESTINAL SYMPTOMS: No  URINARY SYMPTOMS: No  REPRODUCTIVE SYMPTOMS: No  SKELETAL SYMPTOMS: No  BLOOD SYMPTOMS: No  NERVOUS SYSTEM SYMPTOMS: No  MENTAL HEALTH SYMPTOMS: No  Although patient answered no above, he does admit to easy bruising and bleeding  Also positive for slow movement and tremors      PHYSICAL EXAM  BP (!) 148/100  Pulse 95  Temp 98.3  F (36.8  C) (Oral)  Resp 18  Ht 1.734 m (5' 8.25\")  Wt 100.7 kg (222 lb)  SpO2 95%  BMI 33.51 kg/m2    General: Awake, alert, oriented. Well nourished, well developed, no acute distress.  HEENT: Head normocephalic, atraumatic. No carotid bruit. Neck supple. Good range of motion. No palpable thyroid mass.  Heart: Regular rhythm and rate. No murmurs.  Lungs: Clear to auscultation and percussion bilaterally. No rhonchi, rales or wheeze. No bruise.  Abdomen: Soft, non-tender, non-distended. No hepatosplenomegaly.  Extremity: Warm with no clubbing or cyanosis. No lower extremity edema.    Neurological:  Awake, alert and oriented to date, time, place and person. Speech fluent.   Pupils equal, round, reactive to light.  Extraocular movement intact.  Visual fields are full on confrontation.  Hearing is grossly normal to finger rub.   Facial sensation intact.  Face symmetric.  Tongue midline.  Uvula elevates equally.    Motor: full strength throughout.  Sensation: intact to light touch and pinpoint.  Deep tendon reflexes: Trace throughout. Negative for clonus. Negative for " Russo's sign. No dysmetria.      LABORATORY DATA (Outside Hospital)  Care Everywhere  Resulted 6/6/2018  Protime   12.4  INR    1.0  Activated PTT   34.3  Thrombin Time  18.4  Factor 8   33 (L)  Von Willebrand Antigen 15 (L)  Von Willebrand Factor <20 (L)        ASSESSMENT   57 year old right-handed male with a history of Idiopathic Parkinson's disease.  Diagnosis of von Willebrand's disease (VWD).    Patient is being considered for DBS surgery.  His neurologist is Dr. Hellen England of [facility].  However, he also has a diagnosis of von Willebrand's disease.  It appears that it is hereditary.  According to Dr. Kirby Fierro at the [facility]/Oncology, patient appears to have Type I von Willebrand's disease (6/6/2018 note).      Dr. Fierro's plan was for referral to Dr. Douglas Riley of Hem/Onc at [facility].  After reviewing the laboratory data, he states the following.    Addendum - VWD panel returned. Ag and activity both very low. f8 quite low as well but at 30% not quite impacting ptt yet. This definitely would need treatment/addressing with clear plan before surgery. Likely need DDAVP challenge test at minimum to start.     He is still awaiting an appointment and evaluation with Dr. Riley.  Patient did wonder how his von Willebrand's disease diagnosis would play a role in the DBS surgery.  I briefly discussed with him and his wife that his VWD would play an important role in terms of risk of bleeding - intracranial bleeding - with microelectrode recording and insertion of the DBS electrode.      I would be interested in getting Dr. Riley's recommendations regarding perioperative treatment for his DBS surgery.  Although there is literature explaining prophylaxis or preoperative/intraoperative/postoperative treatment of VWD to minimize hemorrhage during elective surgical procedures, they do not include neurosurgical procedures, especially insertion of electrodes or intracranial or intracerebral procedures.   One concern with the DBS or any stereotactic surgery is that there is no way to achieve local control of bleeding, as electrodes are inserted into the brain parenchyma.  Given the VWD, there is increased risk of bleeding or intracranial hemorrhage, for this patient undergoing DBS.    I will await to see what the recommendations are from Dr. Riley.  Our consideration to move forward would depend on the opinion and recommendations of Dr. Riley.    His MRI brain is still pending which would need review to look for anatomical consideration for DBS.      During today's visit, we discussed both phase I and II of DBS surgery.  We discussed that during Phase I, we would place the DBS electrode on the left side under MAC and microelectrode recording.  He would then return one week later for the phase II with placement of the DBS generator/battery over the left chest wall under general anesthesia.  If he is a unilateral candidate or wait and see strategy candidate, then he will undergo another evaluation/discussion prior to proceeding to the right side implantation.  If he is a bilateral candidate in a staged fashion, he would return three weeks later for the phase I and II combined for the placement of the DBS electrode on the right side under MAC and microelectrode recording followed by connection to the previously implanted generator/battery.     Risks, benefits, alternative therapies were discussed with the patient, including but not limited to infection and bleeding (intracranial), injury to the brain, stroke, death, hardware failure and possible need for more surgeries.  We especially discussed the increased risk of intracranial bleeding.  Surgical procedure was discussed in detail.  All questions were answered, and he expressed understanding.     A full history and physical exam was performed during today's visit.  His case will be discussed at the Movement Disorder Consensus Group Meeting to determine whether he is  a good candidate for DBS surgery.    Briefly, following topic was discussed.    Medtronic  MRI compatible.  Non-rechargeable and rechargeable generator/battery available.  4 contact electrode.  Communication method: have the  or patient controller on the skin directly over the generator/battery.    [employer]  MRI compatible.  Non-rechargeable generator/battery.  8 contact segmented/directional electrode.  Communication method: Wireless/bluetooth.    [employer] A/S  Not MRI compatible.  Working on generator/battery that will be MRI compatible.  If patient gets an implant and needs an MRI in the future, when the device becomes available, patient can have the upgrade.  Rechargeable generator/battery.  8 contact electrode.  Independent current control at each contacts.  Communication method: Wireless.    I did discuss that the implant technique for these devices are relatively the same which was discussed again.  They all have similar risks associated with the surgery.      PLAN:  1.  MRI brain scheduled.  2.  Neuropsych evaluation.  3.  Hematology/Oncology evaluation and recommendation.  4.  We will discuss his case at the Movement Disorder Consensus Group Meeting, and we will contact him regarding his DBS candidacy.       IBrian, am serving as a scribe to document services personally performed by Sidney Cardozo MD, PhD, based upon my observations and the provider's statements to me. All documentation has been reviewed and edited by the aforementioned doctor prior to being entered into the official medical record.    I, Sidney Cardozo, attest that above named individual is acting in scribe capacity, has observed my performance of the services and has documented them in accordance with my direction. The documentation recorded by the scribe accurately reflects the service I personally performed and the decisions made by me. The document was also partially recorded by me and the entire document was  edited by me as well.       65 minutes were spent face to face with the patient of which more than 50% of the time was spent counseling and discussing the above issues regarding diagnosis, surgical and device options, and steps for further evaluation.

## 2018-08-27 NOTE — PROGRESS NOTES
"PATIENT: Dion Villavicencio    : 1960    MARY: 2018    REASON FOR VISIT:  Pre-DBS ON/OFF motor symptom evaluation.      HPI: Mr. Dion Villavicencio is a 57 year old right - handed  male who came to the [facility] neurology clinic accompanied by his wife, Carmen, for ON/OFF motor evaluation as part of Deep Brain Stimulation surgery work-up for management of Parkinson's disease.     He was diagnosed with Idiopathic Parkinson's disease in  by Dr. England at [place]. His symptom started in  and his initial presentation was difficulty with letting go of a baseball while throwing it. He reports that when throwing a baseball it would end up being thrown straight down as he was not letting go of it soon enough. He also noticed shaking in his Rt hand little, which he noticed while typing. It was believed he has a \"pinched nerve\" but after completing rehab with no benefit, he sought evaluation by Dr. England who diagnosed him with PD.     He has noticed that his tremors are worse and have spread to both feet and also his Lt hand. He has adapted using his Lt hand while maneuvering a mouse when using a computer. He notices difficulty with eating and buttoning his clothes but is still able to complete these tasks without assistance. He notices that he is unsteady with standing still, especially when his eyes are closed, such as when he is showering or praying. However, he denies falls or stumbles. He reports difficulty with having to get down to a kneeling position. He finds it difficult to plan his life around his medications and eating. He feels his social life is limited due to having to coordinate his meds and meals.     Motor symptoms respond well when taking meds. His current regimen is described below. Denies dyskinesias. Recently started Amantadine, which improved wearing off tremors.  It works well with my other medications.   He reports a decline about a year ago in that his meds are not working as well " "to control his symptoms. He has to really watch his diet and when he eats in conjunction to when he takes his meds. He reports losing his sense of taste, mostly \"sweet tooth,\" in 2017, but this came back.     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     PD Medications 7:30am  11:30am  12pm  3:30pm  7:30pm  11:30pm   Ropinirole 2mg   1     1    1   Rytary 145mg  4  4   4   2   2   Sinemet 25/100mg,   1 tab qid prn  1  1       1   Amantadine 100mg      1             His goal for DBS are to control shaking in hands and both feet. He finds it difficult to drive due to the shaking on both feet. He wants to live a more \"normal life\" as he has unpredictable OFF times. He also would like to use less meds. He would like to be more social such as sit through an entire [place] game without having to worry about wearing off.     He would like his right side treat first as he is RH dominant, and eventually the Lt side.    He has Von Willebrand disease. No FH of PD. Has a friend that had DBS with good response and has been able to watch his friend's improvement after DBS.    Last dose of antiparkinsonian medication was taken: 6:45pm 2018.    In clinic, OFF motor exam was completed and patient took Ropinirole 2mg 1 tab, Rytary 145mg 4 caps, CD/LD 25/100mg 1 tab at 8:12 am.  After 60 minutes, ON motor exam was completed.    Physical Exam:    Vital Signs:  Blood pressure (!) 148/100, pulse 95, temperature 98.3  F (36.8  C), temperature source Oral, resp. rate 18, height 1.734 m (5' 8.25\"), weight 100.9 kg (222 lb 6.4 oz), SpO2 95 %., Body mass index is 33.57 kg/(m^2).    MDS Part II  -- Total Score: 21     -- Over the last week -- 0: Normal -- 1: Slight -- 2: Mild -- 3: Moderate -- 4: Severe     2.1 Speech: 2   2.2 Saliva and droolin   2.3 Chewing and swallowin   2.4 Eating tasks: 2   2.5 Dressin   2.6 Hygiene:  1   2.7 Handwriting:  3   2.8 Doing hobbies and " other activities:  2   2.9 Turning in bed:  1   2.10 Tremor:  3   2.11 Getting out of bed/car/deep chair:   2   2.12 Walking and balance: 1   2.13 Freezin     Total:    21      UPDRS III  UPDRS Values 2018   Time: 7:54 AM 9:17 AM   Medication Off On   R Brain DBS: None None   L Brain DBS: None None   Speech 1 1   Facial Expression 1 1   Rigidity Neck 1 0   Rigidity RUE 2 1   Rigidity LUE 2 2   Rigidity RLE 3 1   Rigidity LLE 3 1   Finger Taps R 2 2   Finger Taps L 2 2   Hand Mvt R 3 0   Hand Mvt L 2 0   Pron-/Supinate R 3 2   Pron-/Supinate L 2 2   Toe Tap R 2 2   Toe Tap L 3 3   Leg Agility R 2 1   Leg Agility L 3 2   Arise From Chair 1 0   Gait 1 0   Gait Freezing 0 1   Postural Stability 0 0   Posture 2 2   Global Spont Mvt 1 1   Postural Tremor RUE 2 0   Postural Tremor LUE 0 0   Kinetic Tremor RUE 1 1   Kinetic Tremor LUE 1 0   Rest Tremor RUE 1 1   Rest Tremor LUE 0 0   Rest Tremor RLE 1 0   Rest Tremor LLE 1 0   Rest Tremor Lip/Jaw 0 0   Rest Tremor Constancy 3 0   Total Right 22 11   Total Left 19 12   Axial Total 8 6   Total 52 29      MOTOR TEST: UPDRS Flowsheet was completed in Epic. Exam was videotaped.   Total OFF score = 52  Total ON score = 29    Percentage: 52 - 29 = 23 --> 29 ÷ 52 = 0.557 = 55.7% motor improvement.    Physical Exam:  General: Resting comfortably   Respiratory: No respiratory distress     Neuro Exam:  Mental status: Alert and oriented to person, place, time, and situation. Follows commands  CN: EOMIB, PERRL, facial sensation intact, facial movement symmetric, hearing grossly intact, tongue protrudes midline   Motor: Moves all extremities equally against gravity without difficulty or abnormalities, bradykinetic throughout (more when Off then On), no dyskinesias when On  Reflexes (R/L): 2/2  in biceps, brachioradialis, triceps, patellars, 1/1 achilles; no clonus  Sensation: intact to light touch throughout   Coordination: grossly intact with FTN, HTS  Gait:   - Off:  no arm swing, decreased step length, bradykinetic, stooped posture   - On: decreased arm, R > L, no en bloc turn, step length appropriate, improved stooped postured       ASSESSMENT/PLAN:    Parkinson's Disease: Mr. Villavicencio is a 57 year old right handed male with a 12 year history of tremor-dominant Idiopathic Parkinson's disease who came to the clinic for ON/OFF motor evaluation as part of his Deep Brain Stimulation surgery work-up.    __  Pt had knowledge about DBS.  Patient participated in a DBS seminar in 3/2018 DBS at [place] which explained DBS devices/batteries, placement of leads, what to expect for improvements, and DBS programming.  I briefly went over DBS risks, & benefits; the possibility of 1 - 3 % serious side effects including infection, bleeding, stroke, cognitive & speech impairment, seizures, & death; the possibility of lead misplacement; appropriate DBS candidates; and DBS programming & the need to return to clinic for reprogramming.  All questions were answered.    __  He was encouraged to put his support group information on APDA and [employer] website.      __  Pt & wife would like to come to [facility] for DBS programming.     __  He was informed that we'll contact him after the DBS team meets & discusses his work-up. He understands the plan.    The total time spent with the patient in ON/OFF motor evaluation & discussing about DBS surgery was 120 minutes and greater than 50% of the time was spent in counseling & coordination of care.    Filmed by Itzel Miller DO, Movement Disorders Fellow    DIETER Bacon, CNP   [facility]

## 2018-08-27 NOTE — Clinical Note
"2018       RE: Dion Villavicencio  [address] [address]     Dear Colleague,    Thank you for referring your patient, Dion Villavicencio, to the [facility] NEUROLOGY at [facility]. Please see a copy of my visit note below.    PATIENT: Dion Villavicencio    : 1960    MARY: 2018    REASON FOR VISIT:  Pre-DBS ON/OFF motor symptom evaluation.      HPI: Mr. Dion Villavicencio is a 57 year old right handed  male who came to the [facility] neurology clinic accompanied by his wife, Carmen, for ON/OFF motor evaluation as part of Deep Brain Stimulation surgery work-up for management of Parkinson's disease.     He [facility] with [facility] His symptom started in  and his initial presentation was difficulty with letting go of a baseball while throwing it. He reports that when throwing a baseball it would end of being thrown straight down as he was not letting go of it soon enough. He also noticed his little finger in his R hand would shake which he noticed while typing. It was believed he has a \"pinched nerve\" but after completing rehab with no benefit he sought evaluation by Dr. England who diagnosed him with PD.     He has noticed that his tremors are worse and have spread to his both his feet and also his L hand. He has adapted to using his L hand while maneuvering a mouse when using a computer. He notices difficulty with eating and buttoning his clothes but is still able to complete these tasks without assistance. He notices that he is unsteady with standing still, especially when his eyes are closed, such as when he is showering. However, he denies falls or stumbles. He reports difficulty with having to get down to a kneeling position. He finds it difficulty to plan his life around his medications and eating. He feels his social life is limited due to having to coordinate his meds and meals.     He responds well " "when taking meds. His current regimen is described below. Denies dyskinesias. Recently started Amantadine which he reports works well with symptom control. He reports a decline about a year ago in that his meds are not working as well to control his symptoms. He has to really watch his diet and when he eat in conjunction to when he takes his meds. He reports losing his sense of taste, mostly \"sweet tooth,\" in 8/2017, but this came back.     PD Medications  730 am  1130  1200  330  730  1130 pm       Ropinirole 2mg   1 tab     1    1        CD/LD 145mg  4 caps  4   4   2 caps  2        CD/LD 25/100mg, 1 tab qid prn  1 tab  1 prn       1 tab       amantadine 100mgd      1 tab               His goal for DBS are to control shaking in hands and both feet. He finds it difficult to drive due to the shaking on both his feet. He wants to live a more \"normal life\" as he has unpredictable Off times. He also would like to use less meds. He would like to be more social such as sit through an entire [place] game without having to worry about wearing off. His would like his right side treated first as he is RH dominant, and eventually the L side.    He has Von Willebrand disease. No FH of PD. Has a friend that had DBS with good response and has been able to watch his friend's improvement after DBS.     Current antiparkinsonian medication:   No outpatient prescriptions have been marked as taking for the 8/27/18 encounter (Office Visit) with Zainab Bella APRN CNP.       Last dose of antiparkinsonian medication was taken: 645pm 8/26/2018.    In clinic, OFF motor exam was completed and patient took Ropinirole 2mg 1 tab, Rytary 145mg 4 caps, CD/LD 25/100mg 1 tab at 8:12am.  After 60 minutes, ON motor exam was completed.    Physical Exam:    Vital Signs:  Blood pressure (!) 148/100, pulse 95, temperature 98.3  F (36.8  C), temperature source Oral, resp. rate 18, height 1.734 m (5' 8.25\"), weight 100.9 kg (222 lb 6.4 oz), SpO2 95 " %., Body mass index is 33.57 kg/(m^2).    MDS Part II  -- Total Score: 21     -- Over the last week -- 0: Normal -- 1: Slight -- 2: Mild -- 3: Moderate -- 4: Severe     2.1 Speech: 2   2.2 Saliva and droolin   2.3 Chewing and swallowin   2.4 Eating tasks: 2   2.5 Dressin   2.6 Hygiene:  1   2.7 Handwriting:  3   2.8 Doing hobbies and other activities:  2   2.9 Turning in bed:  1   2.10 Tremor:  3   2.11 Getting out of bed/car/deep chair:   2   2.12 Walking and balance: 1   2.13 Freezin     Total:    21      UPDRS III  UPDRS Values 2018   Time: 7:54 AM 9:17 AM   Medication Off On   R Brain DBS: None None   L Brain DBS: None None   Speech 1 1   Facial Expression 1 1   Rigidity Neck 1 0   Rigidity RUE 2 1   Rigidity LUE 2 2   Rigidity RLE 3 1   Rigidity LLE 3 1   Finger Taps R 2 2   Finger Taps L 2 2   Hand Mvt R 3 0   Hand Mvt L 2 0   Pron-/Supinate R 3 2   Pron-/Supinate L 2 2   Toe Tap R 2 2   Toe Tap L 3 3   Leg Agility R 2 1   Leg Agility L 3 2   Arise From Chair 1 0   Gait 1 0   Gait Freezing 0 1   Postural Stability 0 0   Posture 2 2   Global Spont Mvt 1 1   Postural Tremor RUE 2 0   Postural Tremor LUE 0 0   Kinetic Tremor RUE 1 1   Kinetic Tremor LUE 1 0   Rest Tremor RUE 1 1   Rest Tremor LUE 0 0   Rest Tremor RLE 1 0   Rest Tremor LLE 1 0   Rest Tremor Lip/Jaw 0 0   Rest Tremor Constancy 3 0   Total Right 22 11   Total Left 19 12   Axial Total 8 6   Total 52 29      Physical Exam:  General: Resting comfortably   Respiratory: No respiratory distress     Neuro Exam:  Mental status: Alert and oriented to person, place, time, and situation. Follows commands  CN: EOMIB, PERRL, facial sensation intact, facial movement symmetric, hearing grossly intact, tongue protrudes midline   Motor: Moves all extremities equally against gravity without difficulty or abnormalities, bradykinetic throughout (more when Off then On), no dyskinesias when On  Reflexes (R/L): 2/2  in biceps,  brachioradialis, triceps, patellars, 1/1 achilles; no clonus  Sensation: intact to light touch throughout   Coordination: grossly intact with FTN, HTS  Gait:   - Off: no arm swing, decreased step length, bradykinetic, stooped posture   - On: decreased arm, R > L, no en bloc turn, step length appropriate, improved stooped postured     MOTOR TEST: UPDRS Flowsheet was completed in Epic. Exam was videotaped.   Total OFF score = 52  Total ON score = 29    Percentage: 52 - 29 = 23 --> 29 ÷  52 = 0.557 = 55.7% motor improvement.      ASSESSMENT/PLAN:    Parkinson's Disease: Mr. Villavicencio is a 57 year old right handed male with a 12 year history of Idiopathic Parkinson's disease who came to the clinic for ON/OFF motor evaluation as part of his Deep Brain Stimulation surgery work-up.    __  Pt had knowledge about DBS.  Patient participated in a DBS seminiar in 3/2018 DBS at [place] which explained DBS devices/batteries, placement of leads, what to expect for improvements, and DBS programming.  I briefly went over DBS risks, & benefits; the possibility of 1 - 3 % serious side effects including infection, bleeding, stroke, cognitive & speech impairment, seizures, & death; the possibility of lead misplacement; appropriate DBS candidates; and DBS programming & the need to return to clinic for reprogramming.  All questions were answered.    __  He was informed that we'll contact him after the DBS team meets & discusses his work-up. He understands the plan.    The total time spent with the patient in ON/OFF motor evaluation & discussing about 1 hour & DBS surgery was 120 minutes and greater than 50% of the time was spent in counseling & coordination of care.    Filmed by Itzel Miller DO, Movement Disorders Fellow    DIETRE Bacon, CNP   [facility]          Again, thank you for allowing me to participate in the care of your patient.      Sincerely,    DIETER Escobar CNP

## 2018-08-27 NOTE — MR AVS SNAPSHOT
After Visit Summary   8/27/2018    Dion Villavicencio    MRN: [id number]           Patient Information     Date Of Birth          1960        Visit Information        Provider Department      8/27/2018 7:20 AM Zainab Bella APRN [facility] Neurology        Care Instructions    __  You have completed the ON/OFF Motor Assessment.    __  Continue with your DBS workup appointments.  __  Once you're done with your workup, we'll discuss you at the DBS Consensus meeting & will let you know the decision.  __  You may contact us with any question.             Follow-ups after your visit        Your next 10 appointments already scheduled     Aug 27, 2018 10:00 AM CDT   (Arrive by 9:45 AM)   Deep Brain Stimulation with Sidney Cardozo MD   [facility] Neurosurgery ([facility])    [address] [place] [id number]   [phone]            Aug 30, 2018  8:00 AM CDT   (Arrive by 7:45 AM)   Neuropsych Eval with Mary Marsh, PhD [facility] Neuropsychology ([facility])    [address] [place] [id number]   [phone]            Aug 31, 2018  1:30 PM CDT   (Arrive by 1:00 PM)   MR BRAIN W/O & W CONTRAST with [id number]   [facility] ([facility])    [address] [facility] [address]   [phone]           Take your medicines as usual, unless your doctor tells you not to. Bring a list of your current medicines to your exam (including vitamins, minerals and over-the-counter drugs).  You may or may not receive intravenous (IV) contrast for this exam pending the discretion of the Radiologist.  You do not need to do anything special to prepare.  The MRI machine uses a strong magnet. Please wear clothes without metal (snaps, zippers). A sweatsuit works well, or we may give you a hospital gown.  Please remove any body piercings and hair extensions before  you arrive. You will also remove watches, jewelry, hairpins, wallets, dentures, partial dental plates and hearing aids. You may wear contact lenses, and you may be able to wear your rings. We have a safe place to keep your personal items, but it is safer to leave them at home.  **IMPORTANT** THE INSTRUCTIONS BELOW ARE ONLY FOR THOSE PATIENTS WHO HAVE BEEN PRESCRIBED SEDATION OR GENERAL ANESTHESIA DURING THEIR MRI PROCEDURE:  IF YOUR DOCTOR PRESCRIBED ORAL SEDATION (take medicine to help you relax during your exam):   You must get the medicine from your doctor (oral medication) before you arrive. Bring the medicine to the exam. Do not take it at home. You ll be told when to take it upon arriving for your exam.   Arrive one hour early. Bring someone who can take you home after the test. Your medicine will make you sleepy. After the exam, you may not drive, take a bus or take a taxi by yourself.  IF YOUR DOCTOR PRESCRIBED IV SEDATION:   Arrive one hour early. Bring someone who can take you home after the test. Your medicine will make you sleepy. After the exam, you may not drive, take a bus or take a taxi by yourself.   No eating 6 hours before your exam. You may have clear liquids up until 4 hours before your exam. (Clear liquids include water, clear tea, black coffee and fruit juice without pulp.)  IF YOUR DOCTOR PRESCRIBED ANESTHESIA (be asleep for your exam):   Arrive 1 1/2 hours early. Bring someone who can take you home after the test. You may not drive, take a bus or take a taxi by yourself.   No eating 8 hours before your exam. You may have clear liquids up until 4 hours before your exam. (Clear liquids include water, clear tea, black coffee and fruit juice without pulp.)   You will spend four to five hours in the recovery room.  Please call the Imaging Department at your exam site with any questions.              Who to contact     Please call your clinic at [phone] to:    Ask questions about your  "health    Make or cancel appointments    Discuss your medicines    Learn about your test results    Speak to your doctor            Additional Information About Your Visit        [employer] Information     [employer] gives you secure access to your electronic health record. If you see a primary care provider, you can also send messages to your care team and make appointments. If you have questions, please call your primary care clinic.  If you do not have a primary care provider, please call [phone] and they will assist you.      [employer] is an electronic gateway that provides easy, online access to your medical records. With [employer], you can request a clinic appointment, read your test results, renew a prescription or communicate with your care team.     To access your existing account, please contact your [facility] or call [phone] for assistance.        Care EveryWhere ID     This is your Care EveryWhere ID. This could be used by other organizations to access your [facility] medical records  [id number]        Your Vitals Were     Pulse Temperature Respirations Height Pulse Oximetry BMI (Body Mass Index)    95 98.3  F (36.8  C) (Oral) 18 1.734 m (5' 8.25\") 95% 33.57 kg/m2       Blood Pressure from Last 3 Encounters:   08/27/18 (!) 148/100   08/17/18 122/88   08/17/18 122/88    Weight from Last 3 Encounters:   08/27/18 100.9 kg (222 lb 6.4 oz)   08/17/18 100.2 kg (221 lb)   08/17/18 100.5 kg (221 lb 9.6 oz)              Today, you had the following     No orders found for display       Primary Care Provider Office Phone # Fax #    Ritesh Knutson [phone]       [facility] [address] [place] [address]        Equal Access to Services     JULIO BALDERRAMA : Ashley Bustamante, wayeimi galvan, qasedrick kaashtyn lloyd, traci alaniz. So [facility] [phone].    ATENCIÓN: Si jocy espjoo benson bashir " disposición servicios gratuitos de asistencia lingüística. Viv agosto [phone].    We comply with applicable federal civil rights laws and [place] laws. We do not discriminate on the basis of race, color, national origin, age, disability, sex, sexual orientation, or gender identity.            Thank you!     Thank you for choosing [facility] NEUROLOGY  for your care. Our goal is always to provide you with excellent care. Hearing back from our patients is one way we can continue to improve our services. Please take a few minutes to complete the written survey that you may receive in the mail after your visit with us. Thank you!             Your Updated Medication List - Protect others around you: Learn how to safely use, store and throw away your medicines at www.disposemymeds.org.          This list is accurate as of 8/27/18  9:29 AM.  Always use your most recent med list.                   Brand Name Dispense Instructions for use Diagnosis    amantadine 100 MG capsule    SYMMETREL    90 capsule    100mg capsule by mouth daily @ 11am-1230pm    Parkinsonism, unspecified Parkinsonism type (H)       CENTRUM SILVER per tablet     30 tablet    Centrum vitamin by mouth daily @ 7-830am        REQUIP 2 MG tablet   Generic drug:  rOPINIRole     270 tablet    2mg tab by mouth @ 7-830am, 3-430pm and 11pm-1230am        * RYTARY 36. MG Cpcr per CR capsule   Generic drug:  carbidopa-levodopa ER      4 capsules @ 7-830am, 11am-1230p and 3-430pm; and 2 capsules @ 7-830pm and 11pm-1230am        * carbidopa-levodopa  MG per tablet    SINEMET    360 tablet    25/100 tab by mouth @ 7-830am, 1pm and 11pm-1230am and extra if needed = 4/day    Parkinsonism, unspecified Parkinsonism type (H)       vitamin D 2000 units tablet     100 tablet    2000 units by mouth daily @ 7-830am        * Notice:  This list has 2 medication(s) that are the same as other medications prescribed for you. Read the directions carefully, and ask your  doctor or other care provider to review them with you.

## 2018-08-27 NOTE — LETTER
"8/27/2018       RE: Dion Villavicencio  [address] [address]     Dear Colleague,    Thank you for referring your patient, Dion Villavicencio, to the [facility] NEUROSURGERY at [facility]. Please see a copy of my visit note below.    HISTORY AND PHYSICAL EXAM    Chief Complaint   Patient presents with     Consult     [facility] DEEP BRAIN STIMULATION, CONSULTATION. Parkinson's disease. Von Willebrand disease       HISTORY OF PRESENT ILLNESS  . Dion Villavicencio is seen in the Neurosurgery Clinic for DBS candidacy evaluation.  He is a 57 year old right-handed male with diagnosis of Parkinson's disease. He was diagnosed with Idiopathic Parkinson's disease in 2006 by Dr. Hellen England who still sees him.  His symptoms started in 2006 when he found that it was difficult for him to let go of a baseball while throwing it.  Because of this, the baseball would end up being thrown straight down because he was not letting go of it soon enough.  He also noticed shaking in his right hand, especially during typing.  He has now noticed that his tremors are worse which has spread to both feet and his left hand.  He has adapted using his left hand while maneuvering a mouse when using a computer.  He has difficulty with eating and buttoning his clothes, but he can still complete these tasks without assistance.  He also reports being unsteady when standing, with his eyes closed.  No falls have been reported.  He feels that his social life is now limited due to having to coordinate his medications and meals.  His goal for DBS are to control his shaking in his hands and both of his feet.  It is difficult for him to drive with the shakes.  He wants to live a more \"normal life\" as he has unpredictable OFF times.  He would also like to use less medications.  He would like his right side treated first, as he is right hand dominant, and eventually the left side.     Of note, he has a diagnosis of von Willebrand's " disease.  His father and son have it as well.  When his son was in the hospital, the diagnosis was made which led to his diagnosis.  He does report that he bruises easily and once when he needed stitches for his face, his entire face was bruised due to bleeding.  He was being considered for DBS with Dr. England and her team.  However, his hematologists suggested getting his DBS surgery at the [facility] with close coordination of perioperative plan for the von Willebrand's disease with Dr. Douglas Riley, hematologist at the [facility].    Because of the von Willebrand's disease and the perioperative care the patient may need, Dr. England referred the patient for the evaluation and possible surgical intervention, if he is a candidate.      Past Medical History:   Diagnosis Date     Esophageal reflux 7/29/2018     Hx of von Willebrand's disease 7/29/2018     Parkinson disease (H) 7/29/2018       Past Surgical History:   Procedure Laterality Date     C ORAL SURGERY PROCEDURE  1986    wisdom teeth surgery       Family History   Problem Relation Age of Onset     Eye Disorder Mother      dry eyes ? glaucoma     Diabetes Father      Blood Disease Father      von willendbrand     Skin Cancer Father      Nephrolithiasis Brother      Nephrolithiasis Brother      Blood Disease Son      von willendbrand       Social History     Social History     Marital status:      Spouse name: N/A     Number of children: N/A     Years of education: N/A     Occupational History     Not on file.     Social History Main Topics     Smoking status: Never Smoker     Smokeless tobacco: Never Used     Alcohol use No     Drug use: Not on file     Sexual activity: Not on file     Other Topics Concern     Not on file     Social History Narrative    . lives in [place]. [place]en spouse    Born in [facility]    Grew up in [place] till moved to [place] and grew up there from age 7 yrs.     No family history     to [place] since 1987      "         Allergies   Allergen Reactions     Penicillins      Hives     Pramipexole      Leg swelling and compulsive behavior       Current Outpatient Prescriptions   Medication     amantadine (SYMMETREL) 100 MG capsule     carbidopa-levodopa (SINEMET)  MG per tablet     carbidopa-levodopa ER (RYTARY) 36. MG CPCR per CR capsule     Cholecalciferol (VITAMIN D) 2000 units tablet     Multiple Vitamins-Minerals (CENTRUM SILVER) per tablet     rOPINIRole (REQUIP) 2 MG tablet     No current facility-administered medications for this visit.          REVIEW OF SYSTEMS:  Answers for HPI/ROS submitted by the patient on 8/25/2018 - also per HPI.   General Symptoms: No  Skin Symptoms: No  HENT Symptoms: No  EYE SYMPTOMS: No  HEART SYMPTOMS: No  LUNG SYMPTOMS: No  INTESTINAL SYMPTOMS: No  URINARY SYMPTOMS: No  REPRODUCTIVE SYMPTOMS: No  SKELETAL SYMPTOMS: No  BLOOD SYMPTOMS: No  NERVOUS SYSTEM SYMPTOMS: No  MENTAL HEALTH SYMPTOMS: No  Although patient answered no above, he does admit to easy bruising and bleeding  Also positive for slow movement and tremors      PHYSICAL EXAM  BP (!) 148/100  Pulse 95  Temp 98.3  F (36.8  C) (Oral)  Resp 18  Ht 1.734 m (5' 8.25\")  Wt 100.7 kg (222 lb)  SpO2 95%  BMI 33.51 kg/m2    General: Awake, alert, oriented. Well nourished, well developed, no acute distress.  HEENT: Head normocephalic, atraumatic. No carotid bruit. Neck supple. Good range of motion. No palpable thyroid mass.  Heart: Regular rhythm and rate. No murmurs.  Lungs: Clear to auscultation and percussion bilaterally. No rhonchi, rales or wheeze. No bruise.  Abdomen: Soft, non-tender, non-distended. No hepatosplenomegaly.  Extremity: Warm with no clubbing or cyanosis. No lower extremity edema.    Neurological:  Awake, alert and oriented to date, time, place and person. Speech fluent.   Pupils equal, round, reactive to light.  Extraocular movement intact.  Visual fields are full on confrontation.  Hearing is grossly " normal to finger rub.   Facial sensation intact.  Face symmetric.  Tongue midline.  Uvula elevates equally.    Motor: full strength throughout.  Sensation: intact to light touch and pinpoint.  Deep tendon reflexes: Trace throughout. Negative for clonus. Negative for Russo's sign. No dysmetria.      LABORATORY DATA (Outside Hospital)  Care Everywhere  Resulted 6/6/2018  Protime   12.4  INR    1.0  Activated PTT   34.3  Thrombin Time  18.4  Factor 8   33 (L)  Von Willebrand Antigen 15 (L)  Von Willebrand Factor <20 (L)        ASSESSMENT   57 year old right-handed male with a history of Idiopathic Parkinson's disease.  Diagnosis of von Willebrand's disease (VWD).    Patient is being considered for DBS surgery.  His neurologist is Dr. Hellen England of [place].  However, he also has a diagnosis of von Willebrand's disease.  It appears that it is hereditary.  According to Dr. Kirby Fierro at the [facility]/Oncology, patient appears to have Type I von Willebrand's disease (6/6/2018 note).      Dr. Fierro's plan was for referral to Dr. Douglas Riley of Hem/Onc at [facility].  After reviewing the laboratory data, he states the following.    Addendum - VWD panel returned. Ag and activity both very low. f8 quite low as well but at 30% not quite impacting ptt yet. This definitely would need treatment/addressing with clear plan before surgery. Likely need DDAVP challenge test at minimum to start.     He is still awaiting an appointment and evaluation with Dr. Riley.  Patient did wonder how his von Willebrand's disease diagnosis would play a role in the DBS surgery.  I briefly discussed with him and his wife that his VWD would play an important role in terms of risk of bleeding - intracranial bleeding - with microelectrode recording and insertion of the DBS electrode.      I would be interested in getting Dr. Riley's recommendations regarding perioperative treatment for his DBS surgery.  Although there is literature  explaining prophylaxis or preoperative/intraoperative/postoperative treatment of VWD to minimize hemorrhage during elective surgical procedures, they do not include neurosurgical procedures, especially insertion of electrodes or intracranial or intracerebral procedures.  One concern with the DBS or any stereotactic surgery is that there is no way to achieve local control of bleeding, as electrodes are inserted into the brain parenchyma.  Given the VWD, there is increased risk of bleeding or intracranial hemorrhage, for this patient undergoing DBS.    I will await to see what the recommendations are from Dr. Riley.  Our consideration to move forward would depend on the opinion and recommendations of Dr. Riley.    His MRI brain is still pending which would need review to look for anatomical consideration for DBS.      During today's visit, we discussed both phase I and II of DBS surgery.  We discussed that during Phase I, we would place the DBS electrode on the left side under MAC and microelectrode recording.  He would then return one week later for the phase II with placement of the DBS generator/battery over the left chest wall under general anesthesia.  If he is a unilateral candidate or wait and see strategy candidate, then he will undergo another evaluation/discussion prior to proceeding to the right side implantation.  If he is a bilateral candidate in a staged fashion, he would return three weeks later for the phase I and II combined for the placement of the DBS electrode on the right side under MAC and microelectrode recording followed by connection to the previously implanted generator/battery.     Risks, benefits, alternative therapies were discussed with the patient, including but not limited to infection and bleeding (intracranial), injury to the brain, stroke, death, hardware failure and possible need for more surgeries.  We especially discussed the increased risk of intracranial bleeding.  Surgical  procedure was discussed in detail.  All questions were answered, and he expressed understanding.     A full history and physical exam was performed during today's visit.  His case will be discussed at the Movement Disorder Consensus Group Meeting to determine whether he is a good candidate for DBS surgery.    Briefly, following topic was discussed.    Medtronic  MRI compatible.  Non-rechargeable and rechargeable generator/battery available.  4 contact electrode.  Communication method: have the  or patient controller on the skin directly over the generator/battery.    [employer]  MRI compatible.  Non-rechargeable generator/battery.  8 contact segmented/directional electrode.  Communication method: Wireless/bluetooth.    [employer]  Not MRI compatible.  Working on generator/battery that will be MRI compatible.  If patient gets an implant and needs an MRI in the future, when the device becomes available, patient can have the upgrade.  Rechargeable generator/battery.  8 contact electrode.  Independent current control at each contacts.  Communication method: Wireless.    I did discuss that the implant technique for these devices are relatively the same which was discussed again.  They all have similar risks associated with the surgery.      PLAN:  1.  MRI brain scheduled.  2.  Neuropsych evaluation.  3.  Hematology/Oncology evaluation and recommendation.  4.  We will discuss his case at the Movement Disorder Consensus Group Meeting, and we will contact him regarding his DBS candidacy.       I, Brian Crawford, am serving as a scribe to document services personally performed by Sidney Cardozo MD, PhD, based upon my observations and the provider's statements to me. All documentation has been reviewed and edited by the aforementioned doctor prior to being entered into the official medical record.    I, Sidney Cardozo, attest that above named individual is acting in scribe capacity, has observed my performance  of the services and has documented them in accordance with my direction. The documentation recorded by the scribe accurately reflects the service I personally performed and the decisions made by me. The document was also partially recorded by me and the entire document was edited by me as well.       65 minutes were spent face to face with the patient of which more than 50% of the time was spent counseling and discussing the above issues regarding diagnosis, surgical and device options, and steps for further evaluation.      Again, thank you for allowing me to participate in the care of your patient.      Sincerely,    Sidney Cardozo MD

## 2018-08-27 NOTE — TELEPHONE ENCOUNTER
Pt is aware that Dr. Warner referred him to hematology for Von Willebrand's in the context of potential DBS surgery. The appointment is on 9/6/18, per patient. However, when he spoke with the , she was not able to give him any information about what the appointment would entail, if there will be any test prep necessary, or if Dr. Warner had anything specific in mind re this appointment that should be covered. I will f/u with Dr. Warner; however, I would think that the hematology clinic would notify patient if there is any prep he must do for the appointment.

## 2018-08-27 NOTE — MR AVS SNAPSHOT
After Visit Summary   8/27/2018    Dion Villavicencio    MRN: [id number]           Patient Information     Date Of Birth          1960        Visit Information        Provider Department      8/27/2018 10:00 AM Sidney Cardozo MD [facility] Neurosurgery        Today's Diagnoses     Parkinson's disease (H)    -  1    Von Willebrand's disease (H)           Follow-ups after your visit        Your next 10 appointments already scheduled     Aug 30, 2018  8:00 AM CDT   (Arrive by 7:45 AM)   Neuropsych Eval with Mary Marsh, PhD [facility] Neuropsychology ([facility])    [address]  3rd Floor  [facility] [address] [phone]            Aug 31, 2018  1:30 PM CDT   (Arrive by 1:00 PM)   MR BRAIN W/O & W CONTRAST with [id number]   [facility] ([facility])    [address] [address]   [phone]           Take your medicines as usual, unless your doctor tells you not to. Bring a list of your current medicines to your exam (including vitamins, minerals and over-the-counter drugs).  You may or may not receive intravenous (IV) contrast for this exam pending the discretion of the Radiologist.  You do not need to do anything special to prepare.  The MRI machine uses a strong magnet. Please wear clothes without metal (snaps, zippers). A sweatsuit works well, or we may give you a hospital gown.  Please remove any body piercings and hair extensions before you arrive. You will also remove watches, jewelry, hairpins, wallets, dentures, partial dental plates and hearing aids. You may wear contact lenses, and you may be able to wear your rings. We have a safe place to keep your personal items, but it is safer to leave them at home.  **IMPORTANT** THE INSTRUCTIONS BELOW ARE ONLY FOR THOSE PATIENTS WHO HAVE BEEN PRESCRIBED SEDATION OR GENERAL ANESTHESIA DURING THEIR MRI PROCEDURE:  IF YOUR DOCTOR PRESCRIBED ORAL  SEDATION (take medicine to help you relax during your exam):   You must get the medicine from your doctor (oral medication) before you arrive. Bring the medicine to the exam. Do not take it at home. You ll be told when to take it upon arriving for your exam.   Arrive one hour early. Bring someone who can take you home after the test. Your medicine will make you sleepy. After the exam, you may not drive, take a bus or take a taxi by yourself.  IF YOUR DOCTOR PRESCRIBED IV SEDATION:   Arrive one hour early. Bring someone who can take you home after the test. Your medicine will make you sleepy. After the exam, you may not drive, take a bus or take a taxi by yourself.   No eating 6 hours before your exam. You may have clear liquids up until 4 hours before your exam. (Clear liquids include water, clear tea, black coffee and fruit juice without pulp.)  IF YOUR DOCTOR PRESCRIBED ANESTHESIA (be asleep for your exam):   Arrive 1 1/2 hours early. Bring someone who can take you home after the test. You may not drive, take a bus or take a taxi by yourself.   No eating 8 hours before your exam. You may have clear liquids up until 4 hours before your exam. (Clear liquids include water, clear tea, black coffee and fruit juice without pulp.)   You will spend four to five hours in the recovery room.  Please call the Imaging Department at your exam site with any questions.            Sep 06, 2018 10:00 AM CDT   NEW BLEEDING DISORDER with Anish Ma MD   [facility] ([facility])    [facility] [address] [phone]              Who to contact     Please call your clinic at [phone] to:    Ask questions about your health    Make or cancel appointments    Discuss your medicines    Learn about your test results    Speak to your doctor            Additional Information About Your Visit        MyChart Information      "[employer] gives you secure access to your electronic health record. If you see a primary care provider, you can also send messages to your care team and make appointments. If you have questions, please call your primary care clinic.  If you do not have a primary care provider, please call [phone] and they will assist you.      [employer] is an electronic gateway that provides easy, online access to your medical records. With [employer], you can request a clinic appointment, read your test results, renew a prescription or communicate with your care team.     To access your existing account, please contact your [facility] or call [phone] for assistance.        Care EveryWhere ID     This is your Care EveryWhere ID. This could be used by other organizations to access your [facility] medical records  [id number]        Your Vitals Were     Pulse Temperature Respirations Height Pulse Oximetry BMI (Body Mass Index)    95 98.3  F (36.8  C) (Oral) 18 1.734 m (5' 8.25\") 95% 33.51 kg/m2       Blood Pressure from Last 3 Encounters:   08/27/18 (!) 148/100   08/27/18 (!) 148/100   08/17/18 122/88    Weight from Last 3 Encounters:   08/27/18 100.7 kg (222 lb)   08/27/18 100.9 kg (222 lb 6.4 oz)   08/17/18 100.2 kg (221 lb)              Today, you had the following     No orders found for display       Primary Care Provider Office Phone # Fax #    Ritesh Knutson [phone]       [facility] [address] [place] [address]        Equal Access to Services     J[facility]RETT [facility] : Hadii aviva Bustamante, wabrandonda mandy, qaybtraci valverde. So [facility] [phone].    ATENCIÓN: Si habla español, tiene a bashir disposición servicios gratuitos de asistencia lingüística. Viv al [phone].    We comply with applicable federal civil rights laws and [place] laws. We do not discriminate on the basis of race, color, " national origin, age, disability, sex, sexual orientation, or gender identity.            Thank you!     Thank you for choosing [facility] NEUROSURGERY  for your care. Our goal is always to provide you with excellent care. Hearing back from our patients is one way we can continue to improve our services. Please take a few minutes to complete the written survey that you may receive in the mail after your visit with us. Thank you!             Your Updated Medication List - Protect others around you: Learn how to safely use, store and throw away your medicines at www.disposemymeds.org.          This list is accurate as of 8/27/18 11:59 PM.  Always use your most recent med list.                   Brand Name Dispense Instructions for use Diagnosis    amantadine 100 MG capsule    SYMMETREL    90 capsule    100mg capsule by mouth daily @ 11am-1230pm    Parkinsonism, unspecified Parkinsonism type (H)       CENTRUM SILVER per tablet     30 tablet    Centrum vitamin by mouth daily @ 7-830am        REQUIP 2 MG tablet   Generic drug:  rOPINIRole     270 tablet    2mg tab by mouth @ 7-830am, 3-430pm and 11pm-1230am        * RYTARY 36. MG Cpcr per CR capsule   Generic drug:  carbidopa-levodopa ER      4 capsules @ 7-830am, 11am-1230p and 3-430pm; and 2 capsules @ 7-830pm and 11pm-1230am        * carbidopa-levodopa  MG per tablet    SINEMET    360 tablet    25/100 tab by mouth @ 7-830am, 1pm and 11pm-1230am and extra if needed = 4/day    Parkinsonism, unspecified Parkinsonism type (H)       vitamin D 2000 units tablet     100 tablet    2000 units by mouth daily @ 7-830am        * Notice:  This list has 2 medication(s) that are the same as other medications prescribed for you. Read the directions carefully, and ask your doctor or other care provider to review them with you.

## 2018-08-30 ENCOUNTER — OFFICE VISIT (OUTPATIENT)
Dept: NEUROPSYCHOLOGY | Facility: CLINIC | Age: 58
End: 2018-08-30
Payer: COMMERCIAL

## 2018-08-30 DIAGNOSIS — G20.A1 PARKINSON'S DISEASE (H): Primary | ICD-10-CM

## 2018-08-30 DIAGNOSIS — F09 MENTAL DISORDER DUE TO GENERAL MEDICAL CONDITION: ICD-10-CM

## 2018-08-30 NOTE — NURSING NOTE
The patient was seen for neuropsychological evaluation at the request of Dr. Chucky Warner for the purpose of diagnostic clarification and treatment planning.  2 hours of face to face testing were provided by this writer.  Please see Dr. Mary Marsh's report for a full interpretation of the findings.

## 2018-08-30 NOTE — MR AVS SNAPSHOT
After Visit Summary   8/30/2018    Dion Villavicencio    MRN: [id number]           Patient Information     Date Of Birth          1960        Visit Information        Provider Department      8/30/2018 8:00 AM Mary Marsh, PhD [facility] Neuropsychology        Today's Diagnoses     Parkinson's disease (H)    -  1    Mental disorder due to general medical condition           Follow-ups after your visit        Who to contact     Please call your clinic at [phone] to:    Ask questions about your health    Make or cancel appointments    Discuss your medicines    Learn about your test results    Speak to your doctor            Additional Information About Your Visit        MyChart Information     doxo gives you secure access to your electronic health record. If you see a primary care provider, you can also send messages to your care team and make appointments. If you have questions, please call your primary care clinic.  If you do not have a primary care provider, please call [phone] and they will assist you.      doxo is an electronic gateway that provides easy, online access to your medical records. With doxo, you can request a clinic appointment, read your test results, renew a prescription or communicate with your care team.     To access your existing account, please contact your [facility] or call [phone] for assistance.        Care EveryWhere ID     This is your Care EveryWhere ID. This could be used by other organizations to access your [place] medical records  [id number]         Blood Pressure from Last 3 Encounters:   09/06/18 136/88   08/27/18 (!) 148/100   08/27/18 (!) 148/100    Weight from Last 3 Encounters:   09/06/18 100.7 kg (222 lb 1.6 oz)   08/27/18 100.7 kg (222 lb)   08/27/18 100.9 kg (222 lb 6.4 oz)              We Performed the Following     [id number] TESTING, PER HR/PSYCHOLOGIST     NEUROPSYCH TESTING BY TECH         Primary Care Provider Office Phone # Fax #    Ritesh Knutson [phone]       [facility] [address] [place] [address]        Equal Access to Services     JULIO BALDERRAMA AH: Hadii aad ku hadleonardao Soomaali, waaxda luqadaha, qaybta kaalmada adeegyada, waxsapphire yanezn lila gleasonkatya corbin. So [facility] [phone].    ATENCIÓN: Si habla español, tiene a bashir disposición servicios gratuitos de asistencia lingüística. Llame al [phone].    We comply with applicable federal civil rights laws and [place] laws. We do not discriminate on the basis of race, color, national origin, age, disability, sex, sexual orientation, or gender identity.            Thank you!     Thank you for choosing [facility] NEUROPSYCHOLOGY  for your care. Our goal is always to provide you with excellent care. Hearing back from our patients is one way we can continue to improve our services. Please take a few minutes to complete the written survey that you may receive in the mail after your visit with us. Thank you!             Your Updated Medication List - Protect others around you: Learn how to safely use, store and throw away your medicines at www.disposemymeds.org.          This list is accurate as of 8/30/18 11:59 PM.  Always use your most recent med list.                   Brand Name Dispense Instructions for use Diagnosis    amantadine 100 MG capsule    SYMMETREL    90 capsule    100mg capsule by mouth daily @ 11am-1230pm    Parkinsonism, unspecified Parkinsonism type (H)       CENTRUM SILVER per tablet     30 tablet    Centrum vitamin by mouth daily @ 7-830am        REQUIP 2 MG tablet   Generic drug:  rOPINIRole     270 tablet    2mg tab by mouth @ 7-830am, 3-430pm and 11pm-1230am        * RYTARY 36. MG Cpcr per CR capsule   Generic drug:  carbidopa-levodopa ER      4 capsules @ 7-830am, 11am-1230p and 3-430pm; and 2 capsules @ 7-830pm and 11pm-1230am        * carbidopa-levodopa  MG  per tablet    SINEMET    360 tablet    25/100 tab by mouth @ 7-830am, 1pm and 11pm-1230am and extra if needed = 4/day    Parkinsonism, unspecified Parkinsonism type (H)       vitamin D 2000 units tablet     100 tablet    2000 units by mouth daily @ 7-830am        * Notice:  This list has 2 medication(s) that are the same as other medications prescribed for you. Read the directions carefully, and ask your doctor or other care provider to review them with you.

## 2018-08-31 ENCOUNTER — RADIANT APPOINTMENT (OUTPATIENT)
Dept: MRI IMAGING | Facility: CLINIC | Age: 58
End: 2018-08-31
Attending: PSYCHIATRY & NEUROLOGY
Payer: COMMERCIAL

## 2018-08-31 ENCOUNTER — TRANSFERRED RECORDS (OUTPATIENT)
Dept: HEALTH INFORMATION MANAGEMENT | Facility: CLINIC | Age: 58
End: 2018-08-31

## 2018-08-31 DIAGNOSIS — G20.A1 PARKINSON'S DISEASE (H): ICD-10-CM

## 2018-08-31 RX ORDER — GADOBUTROL 604.72 MG/ML
0.1 INJECTION INTRAVENOUS ONCE
Status: COMPLETED | OUTPATIENT
Start: 2018-08-31 | End: 2018-08-31

## 2018-08-31 RX ADMIN — GADOBUTROL 10.07 ML: 604.72 INJECTION INTRAVENOUS at 14:28

## 2018-09-04 ENCOUNTER — TELEPHONE (OUTPATIENT)
Dept: NEUROLOGY | Facility: CLINIC | Age: 58
End: 2018-09-04

## 2018-09-04 NOTE — TELEPHONE ENCOUNTER
Received fax from [facility] with fasting blood glucose results from 8/31/18. Patient saw Dr. Warner on 8/17 for Deep Brain Stimulation consult and Dr. Warner asked for these results. Placed documents in file to be scanned into his chart.    113 (normal  mg/dL)  Hg A1C = 5.6%

## 2018-09-06 ENCOUNTER — OFFICE VISIT (OUTPATIENT)
Dept: HEMATOLOGY | Facility: CLINIC | Age: 58
End: 2018-09-06
Attending: PSYCHIATRY & NEUROLOGY
Payer: COMMERCIAL

## 2018-09-06 VITALS
OXYGEN SATURATION: 95 % | WEIGHT: 222.1 LBS | BODY MASS INDEX: 33.66 KG/M2 | SYSTOLIC BLOOD PRESSURE: 136 MMHG | HEART RATE: 90 BPM | HEIGHT: 68 IN | DIASTOLIC BLOOD PRESSURE: 88 MMHG | RESPIRATION RATE: 14 BRPM | TEMPERATURE: 98.4 F

## 2018-09-06 DIAGNOSIS — G20.A1 PARKINSON'S DISEASE (H): ICD-10-CM

## 2018-09-06 DIAGNOSIS — D68.01 VON WILLEBRAND DISEASE, TYPE I (H): Primary | ICD-10-CM

## 2018-09-06 PROCEDURE — G0463 HOSPITAL OUTPT CLINIC VISIT: HCPCS

## 2018-09-06 PROCEDURE — 99203 OFFICE O/P NEW LOW 30 MIN: CPT | Performed by: INTERNAL MEDICINE

## 2018-09-06 ASSESSMENT — PAIN SCALES - GENERAL: PAINLEVEL: NO PAIN (0)

## 2018-09-06 NOTE — PATIENT INSTRUCTIONS
Your visit the [facility] was to discuss planning for your upcoming DBS surgery.    1. Your diagnosis is Type 1 (the most common type) von Willebrand Disease  2. Our plan for surgery is to treat you with a von Willebrand Factor Concentrate called Humate-P to keep your von Willebrand Factor level in the normal range for 1 week, starting the day of surgery.  For your second surgery (placement of the battery system) we likely will only need to keep the level normal for a few days.  3. The exact dosing schedule will be based on your von Willebrand Factor levels that we measure in the hospital  4. Please call Letty when your first surgery is scheduled.  5. We will assist with planning Humate-P infusions at home.    I will plan to see you back once every year or 2.  Please call us if you have questions or with any procedures that you have planning and may need treatment for to prevent bleeding complications.

## 2018-09-06 NOTE — PROGRESS NOTES
[facility]  [facility] [place] [address]  Phone: [phone], Fax: [phone]    Outpatient Visit Note:    Patient: Dion Villavicencio  MRN: [id number]  : 1960  MARY: Sep 6, 2018    Reason for Consultation:  Dion Villavicencio is a referred by Dr Chucky Warner for evaluation and treatment of von Willebrand Disease with neurosurgery.    History of Present Illness:  Dion Villavicencio is a 57 year old man with Parkinson's disease that is now refractory to medical management and a strong family history of von Willebrand disease.  He reports that his father had severe and frequent nosebleeds that often required cautery.  After his father was diagnosed with von Willebrand disease he then would be treated with a typical agent such as aminocaproic acid and Stimate.  Dion's son was diagnosed with severe von Willebrand disease after an injury as a young child.  He reports that his son fell and injured his lip which then bled severely and required attention in the emergency department.  Eventually he was diagnosed with severe type I von Willebrand disease.    Dion reports essentially no major bleeding episodes as result of von Willebrand disease.  Before he had this diagnosis he underwent wisdom tooth extraction without excessive bleeding, re-suturing or blood transfusion.  He does recall he has had a laceration to the left side of his face and orbit on the required suturing and did have a large hematoma.  He also had an injury to the left knee which he described as having a large hematoma as well.  He has never had other dental procedures or epistaxis except as a child.  He did have cautery twice as a child for epistaxis, however given his father's severe epistaxis history he was not tested at the time for von Willebrand disease as this appeared to be fairly routine for the family.  Dion has never had major joint surgery or abdominal surgeries.  He is aware of medications for von  Willebrand disease such as Amicar and Stimate as he is helped his son using these medications but he has never used them himself.  He has never received Humate-P in the past    He and his wife report that they were seen in consultation with our neurosurgery colleagues for placement of a deep brain stimulator given his refractory Parkinson's disease.  Given the risk of bleeding with this procedure he was referred to this clinic for perioperative planning.    Past Medical History:  Past Medical History:   Diagnosis Date     Esophageal reflux 7/29/2018     H/O magnetic resonance imaging of brain and brain stem 8/31/2018    MR BRAIN W/O & W CONTRAST 8/31/2018 2:30 PM  History: ; Parkinson's disease (H) ICD-10: Parkinson's disease (H)  Comparison:  3/14/2006    Technique: Midline sagittal T1-weighted images and axial diffusion with images through the whole brain were obtained. Axial and coronal T2-weighted images, and inversion recovery images were obtained with focus on the evans through the corpus callosum, obtained      Hx of von Willebrand's disease 7/29/2018     Parkinson disease (H) 7/29/2018       Past Surgical History:  Past Surgical History:   Procedure Laterality Date     C ORAL SURGERY PROCEDURE  1986    wisdom teeth surgery       Medications:  Current Outpatient Prescriptions   Medication Sig Dispense Refill     amantadine (SYMMETREL) 100 MG capsule 100mg capsule by mouth daily @ 11am-1230pm 90 capsule 3     carbidopa-levodopa (SINEMET)  MG per tablet 25/100 tab by mouth @ 7-830am, 1pm and 11pm-1230am and extra if needed = 4/day 360 tablet 3     carbidopa-levodopa ER (RYTARY) 36. MG CPCR per CR capsule 4 capsules @ 7-830am, 11am-1230p and 3-430pm; and 2 capsules @ 7-830pm and 11pm-1230am       Cholecalciferol (VITAMIN D) 2000 units tablet 2000 units by mouth daily @ 7-830am 100 tablet 3     Multiple Vitamins-Minerals (CENTRUM SILVER) per tablet Centrum vitamin by mouth daily @ 7-830am 30 tablet       rOPINIRole (REQUIP) 2 MG tablet 2mg tab by mouth @ 7-830am, 3-430pm and 11pm-1230am 270 tablet 3        Allergies:  Allergies   Allergen Reactions     Penicillins      Hives     Pramipexole      Leg swelling and compulsive behavior       ROS:  Denies any bleeding issues. No gum bleeding, No nose bleed. Denies any hematuria or blood in stools. Denies any ecchymosis. Denies any lower extremities swelling or pain. Denies any fever, no chest pain. Denies any shortness of breath.     Social History:  Denies any tobacco use. No significant alcohol use. Denies any illicit drug use.    Family History:  Father and son both have been diagnosed with vWD    Objectives:  Pleasant 57 year old male in no acute distress.  Vitals: B/P: 136/88, T: 98.4, P: 90, R: 14, Wt: 222 lbs 1.6 oz  Exam:   Gen: Appears well, no distress  HEENT: No scleral icterus or hemorrahge, no wet purpura, no lymphadenopathy  CV: regular, no murmurs  Pulm: clear  Abd: soft, nontender  Ext: no edema  Neuro: tremulous right hand and is slow to rise from the chair.  No other appreciable deficits.  Affect is good and cognition/speech is normal  Skin: no ecchymoses or petechiae    Labs:  Reviewed from referring institution (sent to [facility])  F8: 30%  VWF Antigen: 15  VWF Activity < 20    Imaging:  none    Assessment:  In summary, Dion Villavicencio is a 57 year old man with refractory Parkinson's Disease and Type 1 vWD with mild bleeding history.  Despite this history, I do think that he is at high risk for bleeding with placement of a DBS device without factor replacement.  Explained that the duration the factor replacement for this, surgery is not known, but my estimation is that one week of therapy is likely sufficient.  In reviewing his von Willebrand panel he is very unlikely to achieve normal von Willebrand factor levels with use of estimate and certainly the duration of elevated levels would not be adequate with this therapy.  Therefore I recommended use of  factor replacement therapy and I think of the choices available Humate-P is a reasonable option.  I explained that her goal is to increase the levels for adequate hemostasis without creating a greatly elevated factor VIII which would put him at risk for thrombotic complications from the surgery.    Given this 2-stage procedure with a brief hospital stay, I recommended that he receive a dose of Humate-P based on his baseline factor VIII and von Willebrand factor.  This would occur about 1 hour prior to the surgery and then we would follow his von Willebrand factor activity in the hospital.  This would help us to understand the kinetics of factor given that this is his first exposure.  We would then Humate-P replacement to maintain a goal von Willebrand level of 100% over the course of the next week as an outpatient.  This will require our working with home infusion service to provide his factor and placement of an indwelling catheter while as an inpatient for him to maintain at home.    Regarding the second stage of the procedure which entails placement of a small device and of the skin to service battery source for the device, I would estimate only a couple days of normal von Willebrand factor levels would be necessary.  We could also supplement this plan with the use of Amicar if required.    Plan:  1. Majority of today's visit was spent counseling the patient regarding treatment of vWD around major surgical procedures.  I did explain that even with achieving our goal factor levels he is still at risk for bleeding as all patients to undergo this procedure are.  2. Humate-P 60 RCo Units/kg x 1 prior to surgery  3. Check vWF Panel (vWF antigen, activity and F8) 1 hour post surgery and at AM draw on post op day 1.  This will inform his kinetics and help with planning his further Humate doses to keep vWF activity ~100.  He will almost certainly require a second dose of Humate P prior to discharge on post op day 1.  4.  We will help arrange for home infusion and for the schedule for factor replacement based on #3.  5. We will make a separate plan for placement of the battery pack device based on experience with his first procedure.    The patient is given our center's contact information and is instructed to call if he should have any further questions or concerns.  Otherwise, we will plan on seeing him back annually and as needed for surgical planning.      Letty Shi, nurse clinician is present druing the entire clinic visit with the patient today.  Patient understands and agrees with the above plan and recommendation.    Anish Ma MD   of Medicine  [employer]

## 2018-09-06 NOTE — MR AVS SNAPSHOT
After Visit Summary   9/6/2018    Dion Villavicencio    MRN: [id number]           Patient Information     Date Of Birth          1960        Visit Information        Provider Department      9/6/2018 10:00 AM Anish Ma MD [facility]        Care Instructions    Your visit the [facility] was to discuss planning for your upcoming DBS surgery.    1. Your diagnosis is Type 1 (the most common type) von Willebrand Disease  2. Our plan for surgery is to treat you with a von Willebrand Factor Concentrate called Humate-P to keep your von Willebrand Factor level in the normal range for 1 week, starting the day of surgery.  For your second surgery (placement of the battery system) we likely will only need to keep the level normal for a few days.  3. The exact dosing schedule will be based on your von Willebrand Factor levels that we measure in the hospital  4. Please call Letty when your first surgery is scheduled.  5. We will assist with planning Humate-P infusions at home.    I will plan to see you back once every year or 2.  Please call us if you have questions or with any procedures that you have planning and may need treatment for to prevent bleeding complications.            Follow-ups after your visit        Who to contact     If you have questions or need follow up information about today's clinic visit or your schedule please contact [facility] directly at [phone].  Normal or non-critical lab and imaging results will be communicated to you by MyChart, letter or phone within 4 business days after the clinic has received the results. If you do not hear from us within 7 days, please contact the clinic through MyChart or phone. If you have a critical or abnormal lab result, we will notify you by phone as soon as possible.  Submit refill requests through [employer] or call your  "pharmacy and they will forward the refill request to us. Please allow 3 business days for your refill to be completed.          Additional Information About Your Visit        MyChart Information     [employer] gives you secure access to your electronic health record. If you see a primary care provider, you can also send messages to your care team and make appointments. If you have questions, please call your primary care clinic.  If you do not have a primary care provider, please call [phone] and they will assist you.        Care EveryWhere ID     This is your Care EveryWhere ID. This could be used by other organizations to access your [place] medical records  [id number]        Your Vitals Were     Pulse Temperature Respirations Height Pulse Oximetry BMI (Body Mass Index)    90 98.4  F (36.9  C) (Oral) 14 1.732 m (5' 8.2\") 95% 33.57 kg/m2       Blood Pressure from Last 3 Encounters:   09/06/18 136/88   08/27/18 (!) 148/100   08/27/18 (!) 148/100    Weight from Last 3 Encounters:   09/06/18 100.7 kg (222 lb 1.6 oz)   08/27/18 100.7 kg (222 lb)   08/27/18 100.9 kg (222 lb 6.4 oz)              Today, you had the following     No orders found for display       Primary Care Provider Office Phone # Fax #    Ritesh Knutson [phone]       [facility] [address] [place] [address]        Equal Access to Services     [facility]: Hadii aad ku hadasho Soomaali, waaxda luqadaha, qaybta kaalmada adeegyada, waxay marissa bradley . So [facility] [phone].    ATENCIÓN: Si habla español, tiene a bashir disposición servicios gratuitos de asistencia lingüística. Llame al [phone].    We comply with applicable federal civil rights laws and [place] laws. We do not discriminate on the basis of race, color, national origin, age, disability, sex, sexual orientation, or gender identity.            Thank you!     Thank you for choosing [facility]  for your care. Our goal is always to provide you with excellent care. Hearing back from our patients is one way we can continue to improve our services. Please take a few minutes to complete the written survey that you may receive in the mail after your visit with us. Thank you!             Your Updated Medication List - Protect others around you: Learn how to safely use, store and throw away your medicines at www.disposemymeds.org.          This list is accurate as of 9/6/18 11:14 AM.  Always use your most recent med list.                   Brand Name Dispense Instructions for use Diagnosis    amantadine 100 MG capsule    SYMMETREL    90 capsule    100mg capsule by mouth daily @ 11am-1230pm    Parkinsonism, unspecified Parkinsonism type (H)       CENTRUM SILVER per tablet     30 tablet    Centrum vitamin by mouth daily @ 7-830am        REQUIP 2 MG tablet   Generic drug:  rOPINIRole     270 tablet    2mg tab by mouth @ 7-830am, 3-430pm and 11pm-1230am        * RYTARY 36. MG Cpcr per CR capsule   Generic drug:  carbidopa-levodopa ER      4 capsules @ 7-830am, 11am-1230p and 3-430pm; and 2 capsules @ 7-830pm and 11pm-1230am        * carbidopa-levodopa  MG per tablet    SINEMET    360 tablet    25/100 tab by mouth @ 7-830am, 1pm and 11pm-1230am and extra if needed = 4/day    Parkinsonism, unspecified Parkinsonism type (H)       vitamin D 2000 units tablet     100 tablet    2000 units by mouth daily @ 7-830am        * Notice:  This list has 2 medication(s) that are the same as other medications prescribed for you. Read the directions carefully, and ask your doctor or other care provider to review them with you.

## 2018-09-07 NOTE — NURSING NOTE
Dion Villavicencio is a new patient to our clinic.  He presents with a history of von Willebrand disease.  He reports that his father and his son both have the condition as well and have had bleeding.  Dion had his levels drawn through [facility] and those are available to the providers.  He has had minimal bleeding issues, despite having low laboratory values. He has been diagnosed with parkinsons disease and placment of a deep brain stimulator is being proposed.  He is being seen to determine a plan for this to be done safely.  He is here today with his wife.     Welcomed and introduced them to our center and provided them with a contact card with our contact phone numbers.    I did review which provider they going to see today and the timing of seeing that provider.    Medications and allergies were reviewed, updated and reconciled with outside records.    I thanked them for coming in and encouraged them to call with any concerns or questions.    Letty Shi, RN - Nurse Clinician - [facility] - [phone]

## 2018-09-10 ENCOUNTER — TELEPHONE (OUTPATIENT)
Dept: NEUROLOGY | Facility: CLINIC | Age: 58
End: 2018-09-10

## 2018-09-10 NOTE — TELEPHONE ENCOUNTER
"                                                      Deep Brain Stimulation Surgery Surgical Candidacy Form    Referring Provider: Dr. Hellen England and Dr. Douglas Riley ([facility])    Referred d/t underlying Von Willebrand's disease; per Dr. England, hematology thought [facility]  if [facility] is a candidate with close coordination of perioperative plan for the von Willebrand's disease with Dr. Douglas Riley, hematologist at the [facility].   Patient Information: Lives in [place], [place]  Name: Dion Villavicencio  YOB: 1960  Age: 57 year old  Height: 5'8\"  Weight: 222 lbs.  BMI: 33.64  Blood Pressure: 136/88  Diagnosis: Parkinson's disease  Age of Onset of Symptoms: ?. Year of Onset of Symptoms: ?.  Age of Diagnosis: 45. Year of Diagnosis: 2006.  Handedness: Right.  Side of Onset: Right upper extremity.     Disease Features: Postural Instability and Tremor, fluctuations   Surgery Goals: Decrease ON/OFF fluctuations., Decrease tremor. and Medication reduction.  His goal for DBS are to control shaking in hands and both feet. He finds it difficult to drive due to the shaking on both feet. He wants to live a more \"normal life\" as he has unpredictable OFF times. He also would like to use less meds. He would like to be more social.     He would like his right side treat first as he is RH dominant, and eventually the Lt side    Wants to come to [facility] for programming   Medications: As of 9/11/18  Current Outpatient Prescriptions   Medication Sig Dispense Refill     amantadine (SYMMETREL) 100 MG capsule 100mg capsule by mouth daily @ 11am-1230pm 90 capsule 3     carbidopa-levodopa (SINEMET)  MG per tablet 25/100 tab by mouth @ 7-830am, 1pm and 11pm-1230am and extra if needed = 4/day 360 tablet 3     carbidopa-levodopa ER (RYTARY) 36. MG CPCR per CR capsule 4 capsules @ 7-830am, 11am-1230p and 3-430pm; and 2 capsules @ 7-830pm and 11pm-1230am       Cholecalciferol (VITAMIN D) 2000 units tablet 2000 " units by mouth daily @ 7-830am 100 tablet 3     Multiple Vitamins-Minerals (CENTRUM SILVER) per tablet Centrum vitamin by mouth daily @ 7-830am 30 tablet      rOPINIRole (REQUIP) 2 MG tablet 2mg tab by mouth @ 7-830am, 3-430pm and 11pm-1230am 270 tablet 3     PD Medications 7:30am  11:30am  12pm  3:30pm  7:30pm  11:30pm   Ropinirole 2mg   1      1    1   Rytary 145mg  4  4    4   2   2   Sinemet 25/100mg,   1 tab qid prn  1  1        1   Amantadine 100mg       1            Motor Assessment: 8/27/18  ON: 29  OFF: 52  % Change: 55.7%  Last dose of antiparkinsonian medication was taken: 6:45pm 8/26/2018.   Neuropsychological Evaluation: 8/30/18    Cognitive Issues: Results indicate impairments in learning, while memory falls within normal limits. Performance otherwise falls well within normal limits across cognitive domains, generally in the above average range.    Psychiatric Issues: He denied any significant psychiatric history. He also denied significant depressive symptomatology, anxiety, or apathy. He denied gambling. On a questionnaire, he endorsed rare compulsive behaviors involving sex, eating, and performance tasks or hobbies; on interview, he noted that these were worse when he took Mirapex. He denied alcohol, tobacco, or illicit drug use. He has a good support system in his family.     Depression: No depression.    Cognitive Function: Mild memory difficulties or frontal deficits. (learning)    Psychosis: No hallucinations.     MRI Date: 8/31/18    Impression:   Mild cerebral volume loss, parietal predominant, greater  than expected for age.     PMH: -  Past Medical History:   Diagnosis Date     Esophageal reflux 7/29/2018     H/O magnetic resonance imaging of brain and brain stem 8/31/2018    MR BRAIN W/O & W CONTRAST 8/31/2018 2:30 PM  History: ; Parkinson's disease (H) ICD-10: Parkinson's disease (H)  Comparison:  3/14/2006    Technique: Midline sagittal T1-weighted images and axial diffusion with images  "through the whole brain were obtained. Axial and coronal T2-weighted images, and inversion recovery images were obtained with focus on the evans through the corpus callosum, obtained      Hx of von Willebrand's disease 7/29/2018     Parkinson disease (H) 7/29/2018 8/31/18 A1C = 5.6%    Past Surgical History:   Procedure Laterality Date     C ORAL SURGERY PROCEDURE  1986    wisdom teeth surgery     Soc Hx:  reports that he has never smoked. He has never used smokeless tobacco. He reports that he does not drink alcohol.    Family Hx of Movement Disorders: family history includes Blood Disease in his father and son; Diabetes in his father; Eye Disorder in his mother; Nephrolithiasis in his brother and brother; Skin Cancer in his father.    Consult Dr. Chucky Warner   Patient discussed at conference on: 9/13/18, 11/15/18, 11/29/18     DBS Meeting Notes/Further Work-up Needed: -  1. Dr. Cardozo to speak with neurosurgery colleagues and Dr. العلي  2. Need specific consent with higher risk  3. Skip Bradley/Juliane and Alcon to approach Jose Suggs to do case with Alpha Map (7T and SIS)   4. Possible bilateral STN same time. No MIRIAM passes - small jada hole - Place battery at same time  5. Infinity 7    Called patient to discuss. Let him know that we will need to discuss him again after gathering additional information. I told him that if he does not hear from us it is because we have not completed our investigation. He verbalized appreciation that we are looking into this with such vigor since \"this has never been done before in a patient like him.\"  -----------------  11/15/18  1. doctor to doctor call Dr. Cardozo - and hematologist - Emmanuelle Hernandez to coordinate  2. Karen to call to let him know we are still looking at situation - Emmanuelle is doing this  3. Karen to set up appointment with Dr. Cardozo   ------------------  11/29/18  1. Bilateral if it lowers his bleeding risk and battery    2. STN Bilateral simultaneous    3. [place] " [place] [phone]   4. Dr. Cardozo to finalize plan with Dr. Riley    Called patient to discuss plan. He will be coming to see Dr. Cardozo on 1/7/19 with his wife, to discuss the details of the surgical plan with Dr. Cardozo.

## 2018-09-14 ENCOUNTER — TELEPHONE (OUTPATIENT)
Dept: NEUROLOGY | Facility: CLINIC | Age: 58
End: 2018-09-14

## 2018-09-14 NOTE — TELEPHONE ENCOUNTER
"From the 18 Deep Brain Stimulation Consensus meetin. Dr. Cardozo to speak with neurosurgery colleagues and Dr. العلي  2. Need specific consent with higher risk  3. Skip Bradley/Juliane and Alcon to approach Jose [facility]Sggs to do case with Alpha Map (7T and SIS)   4. Possible bilateral STN same time. No MIRIAM passes - small jada hole - Place battery at same time  5. Infinity 7    Called patient to discuss. Let him know that we will need to discuss him again after gathering additional information. I told him that if he does not hear from us it is because we have not completed our investigation. He verbalized appreciation that we are looking into this with such vigor since \"this has never been done before in a patient like him.\"      "

## 2018-09-15 NOTE — PROGRESS NOTES
NAME: Dion Villavicencio  MR#: [id number]  YOB: 1960  DATE OF EXAM: 8/30/2018    Neuropsychology Laboratory  [facility]  [address]  [place]  [address] [phone]    NEUROPSYCHOLOGICAL EVALUATION    RELEVANT HISTORY AND REASON FOR REFERRAL    Dion Villavicencio is a 57 year old, right handed, / with 16 years of formal education. Information was obtained via interview with the patient and his wife, and review of his medical records. Records indicate a history of Parkinson s disease, diagnosed in 2006. His symptoms started in 2006 with an initial presentation of difficulty letting go of a baseball while throwing it. He has tremors which are worsening, and have spread from his right hand to both feet, and also his left hand. About a year ago, he had a decline in that his medications have not been working as well to control his symptoms. He is interested in undergoing deep brain stimulation (DBS) surgery for management of his symptoms. This neuropsychological evaluation was undertaken at the request of Chucky Warner M.D., as part of the presurgical protocol, to asses cognitive functioning and mood, as they pertain to his surgical candidacy, and to establish a neurocognitive baseline.    CLINICAL INTERVIEW FINDINGS    Upon interview, Mr. Villavicencio stated that he was diagnosed with Parkinson s disease 12 years ago. He first noticed a change when he was throwing a ball, and his right hand would not let go of the ball. He also had pain in his right arm and thought that he had pinched a nerve. He developed a slight tremor in the pinky of his right hand. Now, when he is off medications, he has a right sided tremor and both of his legs shake. Driving is difficult for him. It is hard to commit to anything because of the unpredictability of his symptoms. He would like to be able to commit to activities and know that he will be able to drive there. He has a good  understanding of the surgical procedure. He feels confident about being awake during the surgery. He listed risks including infection, bleeding and stroke, and the possibility that they may not target the correct spot. He also listed  normal surgical risks,  and understands that there is a risk of blood clotting as well. His wife described herself as very supportive of him pursuing the surgery, and she is happy for him. She knows that his inability to make commitments ahead of time weighs on him. He loves [employer] games, but has not been able to go this year. She will be able to assist with his cares as necessary following the surgery.    Mr. Villavicencio and his wife agreed that he has not had any major changes in cognition. He is perhaps having slightly greater difficulty with concentration. He may occasionally lose a word. He denied difficulty memory, decision making, or organization. He lives with his wife and their son, who is a senior in high school. His wife has always managed their finances. He manages his own medications, generally without difficulty. He stated that they  are frustrating because they do not always work how they should. He finds that he can go longer before he takes his morning dose, but not his midday dose. He does not ever take an extra dose of the Parkinson s medications, although he sometimes times them differently. He noted that for 11 years, his symptoms were well-managed with medications. Until last August, he did not have to worry about protein intake, but now the timing is all different with his medications. As noted, he continues to drive. He only drives when he feels comfortable, and it is always short distances. He denied any difficulty with driving. He handles his personal cares independently, noting that it is harder for him when his medications are  off,  so he works on timing and makes choices about which clothes he will where depending on that.     Mr. 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 He did not find the medications to be particularly helpful, and he denied any other history of psychiatric illness. He has not worked with a psychologist, nor has he been hospitalized for psychiatric care. He described his mood as  pretty good.  He has had some minor stressors, including a burst  pipe and an issue with their patio. They are moving in a year, and trying to figure out what they need to do to their house. He does not feel depressed. He has a sense of lack of control, and sadness for other people. He denied feelings of guilt. He has not felt anxious, although he worries about whether his medications will work. When asked about irritability, he endorsed moodiness because of frustration with his medications. He does a lot of planning, analyzing the next day to determine timing. He gets upset if people ask him if he took his medications, given the amount of planning that he puts into it. He uses a CPAP to sleep. He was struggling with moving in bed, but using satin sheets has helped. He does not thing that he acts out his dreams, although his wife noted that he flails and jerks in his sleep. He can fall asleep well, but he goes to sleep later than he would like because of the timing of his last dose of medications. He sleeps about 9 hours a night, but does not feel that he gets enough sleep. He may nap less than once a week, when he wakes up early. His appetite has been stable. He had gained weight, which he attributed to being at home more, but he is now intentionally losing weight. His energy level has been fairly good. He pushes through his off times by working outside, and mowing or doing yardwork. His interest level is good. 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 He denied visual or auditory hallucinations.  He denied suicidal ideation or any history of attempts to commit  suicide. He denied any history of alcohol, tobacco, or illicit drug use. He denied gambling. He endorsed rare compulsive behaviors involving sex, eating, and performing tasks or hobbies. He described himself as relentless when solving problems on the computer, maybe more so since he has been on medications. He stated that he compulsive behaviors were worse when he was taking Mirapex.     Mr. Villavicencio completed a Bachelor s degree in [employer] at the [employer]. 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 He worked at another job for two years, leaving in  when they did not give him what they promised. He is currently working part time from home for his [employer]. He has been  since , and has two sons.    In high school, Mr. Villavicencio was hit on his head by a baseball. He does not believe he had a loss of consciousness, but required 23 stitches. He denied any history of seizure or stroke. His balance has been good and he has not been falling. He has noticed that his right hand has been weaker since he was diagnosed with Parkinson s disease. He stated that he has been experiencing fewer headaches since he stopped working full time. He experiences soreness in his lower back, and stated that he tends to contort himself when he is mowing or raking.    PAST MEDICAL HISTORY: Medical records indicate a history of von Willebrand s disease, Parkinson s disease, obstructive sleep apnea,.    CURRENT MEDICATIONS:  Include amantadine, carbidopa-levodopa (Sinemet); carbidopa-levodopa (Rytary), cholecalciferol, multiple vitamins-minerals, ropinirole.    FAMILY MEDICAL HISTORY:  Significant for a maternal grandmother who  at 94 with Alzheimer s disease, a mother who may have memory problems at the age of 77, a maternal uncle with cerebrovascular disease.    BEHAVIORAL OBSERVATIONS    During the evaluation, Mr. Villavicencio was pleasant, cooperative, and seemed to understand the  instructions. He was alert and oriented to person, place and time. Tremors were observed clinically in his right hand. Mood was euthymic. Speech was fluent, with normal articulation, volume, and rate. Spontaneous conversation was present and appropriate. Internal performance validity measures fell within normal limits. The results are believed to accurately reflect his current level of functioning.    MEASURES ADMINISTERED    The following measures were administered by a trained psychometrist, under the direct supervision of a licensed psychologist.    Subtests of the Wechsler Adult Intelligence Scale-4; Reading subtest of the Wide Range Achievement Test-4; subtests of the Wechsler Memory Scale-Revised; Callahan Verbal Learning Test-Revised, Form 4; Brief Visual Memory Test - Revised, Form 4; Brodhead Naming Test; D-KEFS Verbal Fluency, Alternate; Trail Making Test; Stroop; Wisconsin Card Sorting Test - 64; Alcala Judgement of Line Orientation Form V; Clock Drawing; Dementia Rating Scale - 2 (DRS-2) Standard Form; Schrader Depression Inventory-2 (BDI-2), HAM-D, HAM-A, RBDSQ, QUIP, PDQ-39, ESS, Apathy Scale.     RESULTS AND INTERPRETATION    Overall intellectual functioning was estimated to fall in the above average range, consistent with premorbid estimates based on single word reading abilities. Performance on a screening measure of dementia was high average (DRS-2 Total Score = 141/144).    Confrontation naming was high average for his age and level of education. Verbal abstract reasoning was high average. Ability to comprehend and articulate responses to complex social situations was above average. Letter fluency, generative naming to category, and switching fluency were all above average.    Attention span was high average for his age. Divided attention was above average. Performance on a measure of distractibility was above average for his age and level of education. Psychomotor processing speed was above  average.    Basic visual perception, including matching lines and angles, was high average for his age and level of education. Construction of a clock fell within normal limits. Nonverbal deductive reasoning was average for his age.    Novel problem solving, including the ability to generate strategies and solutions, fell within normal limits for his age and level of education.     Immediate recall of verbal narrative material was mildly impaired, with low average recall following a 30 minute delay. On a multiple trial list learning task, immediate recall was average, with above average retention (120%) following a 25 minute delay. Immediate recall of visual material was moderately impaired, with recall following a 25 minute delay falling within normal limits.    On the BDI-2, Mr. Villavicencio denied experiencing significant depressive symptomatology. He also denied significant apathy on an apathy scale.    IMPRESSIONS AND RECOMMENDATIONS    Current results indicate impairments in learning, which are somewhat variable, and memory that falls well within normal limits. Language, visual processing, complex attentional processing, and executive functioning fall within normal limits, generally in the above average range. He denied experiencing significant depressive symptomatology, apathy, or anxiety.    This pattern of performance does not reflect dementia at this time. There is a subtle indication of frontal system involvement manifested in impaired learning. This pattern is consistent with his history of Parkinson s disease. He appears to be capable of comprehending medical information and making well reasoned decisions for himself. He has a good understanding of the surgical procedure and the risks involved. He denied any significant psychiatric history. He does not copeland. He did endorse rare compulsive behaviors involving sex, eating, and performance of tasks or hobbies. On interview, he noted that these were worse when  he took Mirapex. He spends a significant amount of time daily in analyzing his activities to determine when he should take the medications. He does not, however, take extra doses. He has a strong support system in his family. He appears to be a good candidate for surgery from a neurocognitive perspective.    In terms of daily functioning, Mr. Villavicencio s cognitive inefficiencies are not likely to interfere with his ability to actively participate in treatment or to manage his instrumental activities of daily living independently. He may find that he has difficulty learning new information. He will likely benefit from the use of written reminders or checklists. He also seems to benefit from repetition. It may be helpful to follow him over time. Repeated neuropsychological evaluation in one year may help to determine whether his cognitive inefficiencies are progressive. These results have not been discussed with Mr. Villavicencio, although he was given instructions to contact me to review the results in detail if he would like.    Mary Marsh, Ph.D., ABPP  Licensed Psychologist, [place] [id number]  Board Certified in Clinical Neuropsychology      Time spent:  Four hours professional time, including interview, record review, data integration, and report writing (CPT 79454); an additional two hours, including testing administered by a psychometrist and interpreted by a neuropsychologist (CPT 70491). ICD-10 diagnosis: G20; F06.8.

## 2018-09-16 PROBLEM — Z01.89 ENCOUNTER FOR NEUROPSYCHOLOGICAL TESTING: Status: ACTIVE | Noted: 2018-09-16

## 2018-10-31 ENCOUNTER — PATIENT OUTREACH (OUTPATIENT)
Dept: NEUROSURGERY | Facility: CLINIC | Age: 58
End: 2018-10-31

## 2018-10-31 NOTE — PROGRESS NOTES
[facility]:  Care Coordination Note     SITUATION   Dion Villavicencio is a 58 year old male who is receiving support for:  No chief complaint on file.  .    BACKGROUND     Pt went through the DBS workup and is wondering if Dr. Cardozo/the consensus team had an answer on the safety of DBS surgery for him in the context of his clotting disorder. I do not see an update in the chart, so I will f/u with Dr. Cardozo.     ASSESSMENT           PLAN          Nursing Interventions:   Sent message to Dr. Cardozo re pt's candidacy for DBS    Follow-up plan:  Will call patient when I have a response from Dr. Cardozo. Pt in agreement with plan.        DYLON HUNTER

## 2018-10-31 NOTE — PROGRESS NOTES
WAIS-IV    Raw              Age Scaled   Vocabulary  30    13    Block Design       32     15   Coding   21     11   Digit Span   31     12   Visual Puzzles   16     11     WIDE RANGE ACHIEVEMENT TEST - 4    Standard  %tile              Grade      Score  Rank              Equiv.  Reading    134   99            >12.9        WECHSLER MEMORY SCALE - REVISED    Raw Score        MAS         Information & Orientation  14   Logical Memory  Immed.  18   6    Logical Memory  30 min.  16   8       BOWMAN VERBAL LEARNING TEST - REVISED  Form 4                                              Raw                  T                                        Trial 1               7   Trial 2               10   Trial 3  10   Total 1-3  27   49   Learning  3   Delay  12   61   Percent Retained  120   71   True Positives  12   Discrimination Index  11   51   False Positives  1     BRIEF VISUAL MEMORY TEST - REVISED  Form 4                                            Raw                  T                                        Trial 1               1   Trial 2               3   Trial 3  6   Total 1-3  10   25   Learning  5   57   Delay  7   41   Percent Retained  100   >16 (%ile)  True Positives  6   Discrimination Index  6   >16 (%ile)  False Positives  0     BOSTON NAMING TEST  Score    (Correct +Stim Cue)  58   MAS   12    # correct Stimulus Cue    0       # correct Phonemic Cue    2       D-KEFS VERBAL FLUENCY  Alternate    Raw              Age Scaled   Letter Fluency   71   19    Category Fluency       47   14    Switching Fluency    Total Correct  17   15    Switching Acc.  17   15     CLOCK DRAWING  Command  3/3    Copy    3 /3       TRAIL MAKING TEST         Seconds         Errors           MAS                  A    18   0   15  .  B    40   0   15      STROOP                    Raw   +  Val =     Total       MAS        Word  115  +  - =    115   12    Color  75  +  - =   75   10    C-W  50  +  - =   50   15      WISCONSIN CARD  SORTING TEST - 1 deck  # Categories   5                 >16     %ile  # persev err.          4        T          53   Conc lev resp.       59        T         63       FMS                    0                 ERICKSON JUDGMENT OF LINE ORIENTATION  Form V  Raw  28  MAS   13     DEMENTIA RATING SCALE - 2 -Alternate    Raw MAS Raw     MAS   Attention  37   13   Concept  37   10    Init/Psv  37    11  Memory  24    10   Construct  6   10  Total     141/144   12       BDI  Score=     2             Minimal         APATHY SCALE Score=       6         MAS = [place] Older Adult Normative Study Age & Ed. Adj. Scaled Score

## 2018-11-16 ENCOUNTER — PATIENT OUTREACH (OUTPATIENT)
Dept: NEUROSURGERY | Facility: CLINIC | Age: 58
End: 2018-11-16

## 2018-11-16 NOTE — PROGRESS NOTES
[facility]:  Care Coordination Note     SITUATION   Dion Villavicencio is a 58 year old male who is receiving support for:  No chief complaint on file.  .    BACKGROUND     Pt is being considered for DBS surgery for treatment of Parkinson's disease. Pt has a hx of von Willebrand disease.     I let the pt know that Dr. Cardozo is still trying to determine whether DBS is safe for him. He has consulted with neurosurgery colleagues at other DBS centers and understands Dr. Ma's recommendations. However, it is still difficult to determine at this time if the benefits of surgery outweigh the risks for him. Dr. Cardozo would still like to discuss pt's case with Dr. Riley and I have messaged his RN so we can set up a time for a call between them.       ASSESSMENT     Pt understands that Dr. Cardozo is still looking into the situation and we will continue to follow up with the patient as we learn more.       PLAN          Nursing Interventions:   See above    Follow-up plan:  Will f/u with pt after Dr. Cardozo and Dr. Riley have spoken. Pt in agreement with plan.        DYLON HUNTER

## 2018-11-20 ENCOUNTER — TELEPHONE (OUTPATIENT)
Dept: NEUROLOGY | Facility: CLINIC | Age: 58
End: 2018-11-20

## 2018-11-20 NOTE — TELEPHONE ENCOUNTER
From 11/15/18 Consensus:    1. doctor to doctor call Dr. Cardozo - and hematologist - Emmanuelle Hernandez to coordinate  2. Karen to call to let him know we are still looking at situation - Emmanuelle is doing this  3. Karen to set up appointment with Dr. Cardozo

## 2018-12-13 NOTE — TELEPHONE ENCOUNTER
From 11/29/18 Consensus:    1. Bilateral if it lowers his bleeding risk and battery    2. STN Bilateral simultaneous    3. Abbott Infinity 7   4. Dr. Cardozo to finalize plan with Dr. Riley  --------------------  Called patient to discuss plan. He will be coming to see Dr. Cardozo on 1/7/19 with his wife, to discuss the details of the surgical plan with Dr. Cardozo.

## 2019-01-07 ENCOUNTER — OFFICE VISIT (OUTPATIENT)
Dept: NEUROSURGERY | Facility: CLINIC | Age: 59
End: 2019-01-07
Payer: COMMERCIAL

## 2019-01-07 VITALS
HEIGHT: 68 IN | RESPIRATION RATE: 18 BRPM | DIASTOLIC BLOOD PRESSURE: 94 MMHG | WEIGHT: 226.5 LBS | TEMPERATURE: 98.5 F | HEART RATE: 100 BPM | SYSTOLIC BLOOD PRESSURE: 136 MMHG | BODY MASS INDEX: 34.33 KG/M2 | OXYGEN SATURATION: 94 %

## 2019-01-07 DIAGNOSIS — G20.A1 PARKINSON'S DISEASE (H): Primary | ICD-10-CM

## 2019-01-07 DIAGNOSIS — D68.00 VON WILLEBRAND'S DISEASE (H): ICD-10-CM

## 2019-01-07 ASSESSMENT — PAIN SCALES - GENERAL: PAINLEVEL: NO PAIN (0)

## 2019-01-07 ASSESSMENT — MIFFLIN-ST. JEOR: SCORE: 1835.87

## 2019-01-07 NOTE — PROGRESS NOTES
"NEUROSURGERY    HISTORY AND PHYSICAL EXAM    Chief Complaint   Patient presents with     DBS Evaluation     [facility] RETURN - DISCUSS DBS SURGERY PLAN AND OPTIONS       HISTORY OF PRESENT ILLNESS  We saw Mr. Dion Villavicencio in the Neurosurgery Clinic for discussion of his DBS surgical plan in context of his von Willebrand's disease.  He is a 58-year-old right-handed male with Parkinson's disease that was diagnosed in 2006.  In summary, he has a right hand tremor which is worse with typing.  He has now noticed that his tremors are worse which has spread to both feet and his left hand.  He has difficulty with eating and buttoning his clothes, but he can still complete these tasks without assistance.  He also reports being unsteady when standing with his eyes closed.  No falls have been reported.  He feels that his social life is now limited due to having to coordinate his medications and meals.  His goal for DBS is to control his shaking in his hands and both of his feet.  Additionally, he would like to live a more \"normal life\" while using less medication.  In terms of his von Willebrand's disease he does report that he bruises easily and once when he needed stitches for his face, his entire face was bruised due to bleeding.  He was seen and evaluated by Dr. Ma of Hematology/Oncology.  His note from 9/6/2018 is available.  It lays out a transfusion treatment to lower his risk of bleeding.  However, his note did not address the overall risk of bleeding given his disease.  However, phone conversation with Dr. Riley regarding this case took place and he stated that if the patient is given transfusion to achieve factor level goals, his risk should be reduced reasonably to the levels similar to those undergoing the surgery without the von Willebrand's disease.    He completed his DBS workup and his case was discussed at the Movement Disorder Consensus Group Meeting.  He was considered to be a good candidate in terms of DBS, " bilateral candidate using Abbott system.  However, concerns were raised overall for bleeding risk and plan for risk mitigation, including Hematology/Oncology plan.  We asked him to come to the clinic to go over the overall risk, proposed plan, any questions concerning the surgery and determine the patient's decision.    He notes that he uses a CPAP mask that goes over the head and that he needs a tooth extraction performed.      Past Medical History:   Diagnosis Date     Encounter for neuropsychological testing 9/16/2018    IMPRESSIONS AND RECOMMENDATIONS   Current results indicate impairments in learning, which are somewhat variable, and memory that falls well within normal limits. Language, visual processing, complex attentional processing, and executive functioning fall within normal limits, generally in the above average range. He denied experiencing significant depressive symptomatology, apathy, or anxiety.   Th     Esophageal reflux 7/29/2018     H/O magnetic resonance imaging of brain and brain stem 8/31/2018    MR BRAIN W/O & W CONTRAST 8/31/2018 2:30 PM  History: ; Parkinson's disease (H) ICD-10: Parkinson's disease (H)  Comparison:  3/14/2006    Technique: Midline sagittal T1-weighted images and axial diffusion with images through the whole brain were obtained. Axial and coronal T2-weighted images, and inversion recovery images were obtained with focus on the evans through the corpus callosum, obtained      Hx of von Willebrand's disease 7/29/2018     Parkinson disease (H) 7/29/2018       Past Surgical History:   Procedure Laterality Date     C ORAL SURGERY PROCEDURE  1986    wisdom teeth surgery       Family History   Problem Relation Age of Onset     Eye Disorder Mother         dry eyes ? glaucoma     Diabetes Father      Blood Disease Father         von willendbrand     Skin Cancer Father      Nephrolithiasis Brother      Nephrolithiasis Brother      Blood Disease Son         von willendbrand        Social History     Socioeconomic History     Marital status:      Spouse name: Not on file     Number of children: Not on file     Years of education: Not on file     Highest education level: Not on file   Social Needs     Financial resource strain: Not on file     Food insecurity - worry: Not on file     Food insecurity - inability: Not on file     Transportation needs - medical: Not on file     Transportation needs - non-medical: Not on file   Occupational History     Not on file   Tobacco Use     Smoking status: Never Smoker     Smokeless tobacco: Never Used   Substance and Sexual Activity     Alcohol use: No     Drug use: Not on file     Sexual activity: Not on file   Other Topics Concern     Parent/sibling w/ CABG, MI or angioplasty before 65F 55M? Not Asked   Social History Narrative    . lives in [place]. [place]en spouse    Born in [facility]    Grew up in [facility] till moved to [place] and grew up there from age 7 yrs.     No family history     to [place] since 1987          Allergies   Allergen Reactions     Penicillins      Hives     Pramipexole      Leg swelling and compulsive behavior       Current Outpatient Medications   Medication     amantadine (SYMMETREL) 100 MG capsule     carbidopa-levodopa (SINEMET)  MG per tablet     carbidopa-levodopa ER (RYTARY) 36. MG CPCR per CR capsule     Cholecalciferol (VITAMIN D) 2000 units tablet     Multiple Vitamins-Minerals (CENTRUM SILVER) per tablet     rOPINIRole (REQUIP) 2 MG tablet     No current facility-administered medications for this visit.        REVIEW OF SYSTEMS:  Answers for HPI/ROS submitted by the patient on 8/25/2018 - also per HPI.   General Symptoms: No  Skin Symptoms: No  HENT Symptoms: No  EYE SYMPTOMS: No  HEART SYMPTOMS: No  LUNG SYMPTOMS: No  INTESTINAL SYMPTOMS: No  URINARY SYMPTOMS: No  REPRODUCTIVE SYMPTOMS: No  SKELETAL SYMPTOMS: No  BLOOD SYMPTOMS: No  NERVOUS SYSTEM SYMPTOMS:  "No  MENTAL HEALTH SYMPTOMS: No  Although patient answered no above, he does admit to easy bruising and bleeding  Also positive for slow movement and tremors      PHYSICAL EXAM  BP (!) 136/94   Pulse 100   Temp 98.5  F (36.9  C) (Oral)   Resp 18   Ht 1.734 m (5' 8.25\")   Wt 102.7 kg (226 lb 8 oz)   SpO2 94%   BMI 34.19 kg/m      General: Awake, alert, oriented. Well nourished, well developed, no acute distress.  Awake, alert and oriented to date, time, place and person. Speech fluent.   Pupils equal, round, reactive to light.  Face symmetric.  Motor: full strength throughout.  Sensation: intact to light touch and pinpoint.      ASSESSMENT  58 year old right-handed male with a history of Idiopathic Parkinson's disease.  Diagnosis of von Willebrand's disease.    Patient's case was discussed at the Movement Disorder Consensus Group Meeting and he was approved as staged bilateral STN implant using Abbott device.  The concern was to make sure that the patient understood the increased risk of hemorrhage even with the plans for factor transfusion.  We also wanted to make sure that he was in agreement, if he wishes to go forward, with the logistics of the surgeries.    First, we discussed the overall risk of surgery with the patient.  We discussed that there is paucity of literature in neurosurgery when it comes to DBS in patients with bleeding disorder.  I told him that I have asked several of my colleagues around the country to ask for their input and opinion regarding elective brain surgery such as DBS for patients with bleeding disorder.  Most stated that the risk of hemorrhage would be significantly higher and that this should be discussed in detail with the patient prior to proceeding with the surgery.      When I discussed the case with Dr. Riley, Hematologist/Oncologist at [facility], he stated that if the patient is given proper transfusion to correct his factor levels, his risk should be on par with those without " the von Willebrande's disease.  Of note, patient was seen by Dr. Ma, Dr. Riley's partner and the following was documented in the chart dated 9/6/2018.    Assessment:  In summary, Dion Villavicencio is a 57 year old man with refractory Parkinson's Disease and Type 1 vWD with mild bleeding history.  Despite this history, I do think that he is at high risk for bleeding with placement of a DBS device without factor replacement.  Explained that the duration the factor replacement for this, surgery is not known, but my estimation is that one week of therapy is likely sufficient.  In reviewing his von Willebrand panel he is very unlikely to achieve normal von Willebrand factor levels with use of estimate and certainly the duration of elevated levels would not be adequate with this therapy.  Therefore I recommended use of factor replacement therapy and I think of the choices available Humate-P is a reasonable option.  I explained that her goal is to increase the levels for adequate hemostasis without creating a greatly elevated factor VIII which would put him at risk for thrombotic complications from the surgery.     Given this 2-stage procedure with a brief hospital stay, I recommended that he receive a dose of Humate-P based on his baseline factor VIII and von Willebrand factor.  This would occur about 1 hour prior to the surgery and then we would follow his von Willebrand factor activity in the hospital.  This would help us to understand the kinetics of factor given that this is his first exposure.  We would then Humate-P replacement to maintain a goal von Willebrand level of 100% over the course of the next week as an outpatient.  This will require our working with home infusion service to provide his factor and placement of an indwelling catheter while as an inpatient for him to maintain at home.     Regarding the second stage of the procedure which entails placement of a small device and of the skin to service battery  source for the device, I would estimate only a couple days of normal von Willebrand factor levels would be necessary.  We could also supplement this plan with the use of Amicar if required.     Plan:  1. Majority of today's visit was spent counseling the patient regarding treatment of vWD around major surgical procedures.  I did explain that even with achieving our goal factor levels he is still at risk for bleeding as all patients to undergo this procedure are.  2. Humate-P 60 RCo Units/kg x 1 prior to surgery  3. Check vWF Panel (vWF antigen, activity and F8) 1 hour post surgery and at AM draw on post op day 1.  This will inform his kinetics and help with planning his further Humate doses to keep vWF activity ~100.  He will almost certainly require a second dose of Humate P prior to discharge on post op day 1.  4. We will help arrange for home infusion and for the schedule for factor replacement based on #3.  5. We will make a separate plan for placement of the battery pack device based on experience with his first procedure.     The patient is given our center's contact information and is instructed to call if he should have any further questions or concerns.  Otherwise, we will plan on seeing him back annually and as needed for surgical planning.      Therefore, with even the above plan, we discussed that there is an elevated surgical risk based on his von Willebrand's disease.  It is important that Mr. Villavicencio understands the elevated risks and weight them against the potential benefits of surgery.  He had number of questions and they were all answered, mostly related to the bleeding risk and the surgical plan to mitigate or minimize the risk.  At this time, however, after thorough discussion, patient would like to proceed with the surgery.    Second, from the surgical standpoint, we could plan the surgeries in such a way to minimize his overall risk, including risks related to transfusion and to minimize  hospitalization.  One consideration is that we could perform bilateral implantation of the phase I in one day.  This could be followed by phase II surgery in a relatively short time period, since phase II surgery is superficial in nature.  Another consideration is to shorten the surgical time and also to minimize the number of penetration with the cannulas and electrodes during the microelectrode recording.      Considering all the above, the following is tentatively planned.    1.  Patient will be admitted day before the surgery, likely Monday, in preparation for Humate-P 60 RCo Units/Kg prior to surgery.  2.  Plan for bilateral deep brain stimulator placement, phase I, placement of bilateral deep brain stimulator electrodes, target bilateral subthalamic nucleus, with microelectrode recording, on Tuesday.  The surgery will likely be about 8 hours long.  3.  Check vWF Panel (vWF antigen, activity and F8) 1 hour post surgery and at AM draw on post op day 1, Wednesday.  4.  He will need second dose of Humate P on post op day 1, Wednesday.  5.  Another Humate P dose on post op day 2, Thursday and discharge patient to outpatient.   6.  Patient will go to  as an outpatient and undergo phase II generator/battery placement.  7.  Go home for home infusion.    Alternate plan is, if we cannot discharge him to outpatient is this.  1.  Patient will be admitted day before the surgery, likely Monday, in preparation for Humate-P 60 RCo Units/Kg prior to surgery.  2.  Plan for bilateral deep brain stimulator placement, phase I, placement of bilateral deep brain stimulator electrodes, target bilateral subthalamic nucleus, with microelectrode recording, on Tuesday.  The surgery will likely be about 8 hours long.  3.  Check vWF Panel (vWF antigen, activity and F8) 1 hour post surgery and at AM draw on post op day 1, Wednesday.  4.  He will need second dose of Humate P on post op day 1, Wednesday.  5.  Another Humate P dose on post  op day 2, Thursday and take him to surgery for phase II surgery as an inpatient.   6.  Check vWF Panel (vWF antigen, activity and F8) 1 hour post surgery and at AM draw on post op day 1, Friday.  7.  He will need another dose of Humate P on post op day 1, Friday, before the final discharge.  8.  Go home for home infusion.    Backup plan, if we are not able to complete bilateral implantation at step 2 on Tuesday is the following.  1.  Patient will be admitted day before the surgery, likely Monday, in preparation for Humate-P 60 RCo Units/Kg prior to surgery.  2.  Plan for bilateral deep brain stimulator placement, phase I, placement of bilateral deep brain stimulator electrodes, target bilateral subthalamic nucleus, with microelectrode recording, on Tuesday.  The surgery will likely be about 8 hours long.  If we cannot complete bilateral implantation for whatever reason, we will just do one side, left side.  3.  Check vWF Panel (vWF antigen, activity and F8) 1 hour post surgery and at AM draw on post op day 1, Wednesday.  4.  He will need second dose of Humate P on post op day 1, Wednesday.  5.  Another Humate P dose on post op day 2, Thursday and take him to surgery for phase II surgery as an inpatient.   6.  Check vWF Panel (vWF antigen, activity and F8) 1 hour post surgery and at AM draw on post op day 1, Friday.  7.  He will need another dose of Humate P on post op day 1, Friday.  8.  Another Humate P dose on post op day 2, Saturday, 1 hour prior to his second side phase I surgery, right side surgery.  9.  Check vWF Panel (vWF antigen, activity and F8) 1 hour post surgery and at AM draw on post op day 1, Sunday.  10.  He will need second dose of Humate P on post op day 1, Sunday, before his final discharge.  11.  Go home for home infusion.    I explained that programming will occur a month after surgery to allow time for healing.  Medications will not be changed during this time.  Long-term reduction of  medication usage is a possibility.     We did briefly discuss the device type being recommended.  I did confirm that we are recommending Abbott device as the implant.  Patient was in agreement.    I also answered his question about the use of his CPAP mask and if this will be an issue after surgery.  This mask requires a strap that goes over the head.  Although this may be over the incision, its use should be ok after the surgery.      He will also have a tooth extraction performed before our surgery in July, 2019, which is the tentative date.    Risks, benefits, alternative therapies were discussed with the patient, including but not limited to infection and bleeding (intracranial), injury to the brain, stroke, death, hardware failure and possible need for more surgeries.  We especially discussed the increased risk of intracranial bleeding, despite our attempts at reducing the risk with change in our protocol and transfusions.  Surgical procedure was discussed in detail.  All questions were answered, and he expressed understanding.       PLAN  1.  Plan for bilateral deep brain stimulator placement, phase I, placement of bilateral deep brain stimulator electrodes, target bilateral subthalamic nucleus, with microelectrode recording.  2.  Humate P transfusion as planned above - will need approval of Hematology/Oncology.  3.  Plan for phase II as an inpatient if needed or outpatient immediately after discharge from the hospital.  4.  Possibly obtain MRI brain with 7T MRI, if patient is agreeable to research protocol.    I, Tatyana Marquis, am serving as a scribe to document services personally performed by Sidney Cardozo MD, PhD, based upon my observations and the provider's statements to me. All documentation has been reviewed and edited by the aforementioned doctor prior to being entered into the official medical record.    I, Sidney Cardozo, attest that above named individual is acting in scribe capacity, has  observed my performance of the services and has documented them in accordance with my direction. The documentation recorded by the scribe accurately reflects the service I personally performed and the decisions made by me. The document was also partially recorded by me and the entire document was edited by me as well.     65 minutes were spent face to face with the patient of which more than 50% of the time was spent counseling and discussing the above issues regarding medical and surgical plan before, during and after his DBS surgery.

## 2019-01-07 NOTE — LETTER
"1/7/2019       RE: Dion Villavicencio  [address] [facility] [address]     Dear Colleague,    Thank you for referring your patient, Dion Villavicencio, to the [facility] NEUROSURGERY at [facility]. Please see a copy of my visit note below.    NEUROSURGERY    HISTORY AND PHYSICAL EXAM    Chief Complaint   Patient presents with     DBS Evaluation     [facility] RETURN - DISCUSS DBS SURGERY PLAN AND OPTIONS       HISTORY OF PRESENT ILLNESS  We saw Mr. Dion Villavicencio in the Neurosurgery Clinic for discussion of his DBS surgical plan in context of his von Willebrand's disease.  He is a 58-year-old right-handed male with  Parkinson's disease that was diagnosed in 2006.  In summary, he has a right hand tremor which is worse with typing.  He has now noticed that his tremors are worse which has spread to both feet and his left hand.  He has difficulty with eating and buttoning his clothes, but he can still complete these tasks without assistance.  He also reports being unsteady when standing with his eyes closed.  No falls have been reported.  He feels that his social life is now limited due to having to coordinate his medications and meals.  His goal for DBS is to control his shaking in his hands and both of his feet.  Additionally, he would like to live a more \"normal life\" while using less medication.  In terms of his von Willebrand's disease he does report that he bruises easily and once when he needed stitches for his face, his entire face was bruised due to bleeding.   He was seen and evaluated by Dr. Ma of Hematology/Oncology.  His note from 9/6/2018 is available.  It lays out a transfusion treatment to lower his risk of bleeding.  However, his note did not address the overall risk of bleeding given his disease.  However, phone conversation with Dr. Riley regarding this case took place and he stated that if the patient is given transfusion to achieve factor level goals, his risk should be reduced " reasonably to the levels similar to those undergoing the surgery without the von Willebrand's disease.    He completed his DBS workup and his case was discussed at the Movement Disorder Consensus Group Meeting.  He was considered to be a good candidate in terms of DBS, bilateral candidate using Abbott system.  However, concerns were raised overall for bleeding risk and plan for risk mitigation, including Hematology/Oncology plan.  We asked him to come to the clinic to go over the overall risk, proposed plan, any questions concerning the surgery and determine the patient's decision.    He notes that he uses a CPAP mask that goes over the head and that he needs a tooth extraction performed.      Past Medical History:   Diagnosis Date     Encounter for neuropsychological testing 9/16/2018    IMPRESSIONS AND RECOMMENDATIONS   Current results indicate impairments in learning, which are somewhat variable, and memory that falls well within normal limits. Language, visual processing, complex attentional processing, and executive functioning fall within normal limits, generally in the above average range. He denied experiencing significant depressive symptomatology, apathy, or anxiety.   Th     Esophageal reflux 7/29/2018     H/O magnetic resonance imaging of brain and brain stem 8/31/2018    MR BRAIN W/O & W CONTRAST 8/31/2018 2:30 PM  History: ; Parkinson's disease (H) ICD-10: Parkinson's disease (H)  Comparison:  3/14/2006    Technique: Midline sagittal T1-weighted images and axial diffusion with images through the whole brain were obtained. Axial and coronal T2-weighted images, and inversion recovery images were obtained with focus on the evans through the corpus callosum, obtained      Hx of von Willebrand's disease 7/29/2018     Parkinson disease (H) 7/29/2018       Past Surgical History:   Procedure Laterality Date     C ORAL SURGERY PROCEDURE  1986    wisdom teeth surgery       Family History   Problem Relation Age of  Onset     Eye Disorder Mother         dry eyes ? glaucoma     Diabetes Father      Blood Disease Father         von willendbrand     Skin Cancer Father      Nephrolithiasis Brother      Nephrolithiasis Brother      Blood Disease Son         chung baum       Social History     Socioeconomic History     Marital status:      Spouse name: Not on file     Number of children: Not on file     Years of education: Not on file     Highest education level: Not on file   Social Needs     Financial resource strain: Not on file     Food insecurity - worry: Not on file     Food insecurity - inability: Not on file     Transportation needs - medical: Not on file     Transportation needs - non-medical: Not on file   Occupational History     Not on file   Tobacco Use     Smoking status: Never Smoker     Smokeless tobacco: Never Used   Substance and Sexual Activity     Alcohol use: No     Drug use: Not on file     Sexual activity: Not on file   Other Topics Concern     Parent/sibling w/ CABG, MI or angioplasty before 65F 55M? Not Asked   Social History Narrative    . lives in [place]. Carmen spouse    Born in [place]    Grew up in [place] till moved to [place] and grew up there from age 7 yrs.     No family history     to [place] since 1987          Allergies   Allergen Reactions     Penicillins      Hives     Pramipexole      Leg swelling and compulsive behavior       Current Outpatient Medications   Medication     amantadine (SYMMETREL) 100 MG capsule     carbidopa-levodopa (SINEMET)  MG per tablet     carbidopa-levodopa ER (RYTARY) 36. MG CPCR per CR capsule     Cholecalciferol (VITAMIN D) 2000 units tablet     Multiple Vitamins-Minerals (CENTRUM SILVER) per tablet     rOPINIRole (REQUIP) 2 MG tablet     No current facility-administered medications for this visit.        REVIEW OF SYSTEMS:  Answers for HPI/ROS submitted by the patient on 8/25/2018 - also per HPI.   General  "Symptoms: No  Skin Symptoms: No  HENT Symptoms: No  EYE SYMPTOMS: No  HEART SYMPTOMS: No  LUNG SYMPTOMS: No  INTESTINAL SYMPTOMS: No  URINARY SYMPTOMS: No  REPRODUCTIVE SYMPTOMS: No  SKELETAL SYMPTOMS: No  BLOOD SYMPTOMS: No  NERVOUS SYSTEM SYMPTOMS: No  MENTAL HEALTH SYMPTOMS: No  Although patient answered no above, he does admit to easy bruising and bleeding  Also positive for slow movement and tremors      PHYSICAL EXAM  BP (!) 136/94   Pulse 100   Temp 98.5  F (36.9  C) (Oral)   Resp 18   Ht 1.734 m (5' 8.25\")   Wt 102.7 kg (226 lb 8 oz)   SpO2 94%   BMI 34.19 kg/m       General: Awake, alert, oriented. Well nourished, well developed, no acute distress.  Awake, alert and oriented to date, time, place and person. Speech fluent.   Pupils equal, round, reactive to light.  Face symmetric.  Motor: full strength throughout.  Sensation: intact to light touch and pinpoint.      ASSESSMENT  58 year old right-handed male with a history of Idiopathic Parkinson's disease.  Diagnosis of von Willebrand's disease.    Patient's case was discussed at the Movement Disorder Consensus Group Meeting and he was approved as staged bilateral STN implant using Abbott device.  The concern was to make sure that the patient understood the increased risk of hemorrhage even with the plans for factor transfusion.  We also wanted to make sure that he was in agreement, if he wishes to go forward, with the logistics of the surgeries.    First, we discussed the overall risk of surgery with the patient.  We discussed that there is paucity of literature in neurosurgery when it comes to DBS in patients with bleeding disorder.  I told him that I have asked several of my colleagues around the country to ask for their input and opinion regarding elective brain surgery such as DBS for patients with bleeding disorder.  Most stated that the risk of hemorrhage would be significantly higher and that this should be discussed in detail with the patient " prior to proceeding with the surgery.      When I discussed the case with Dr. Riley, Hematologist/Oncologist at [facility], he stated that if the patient is given proper transfusion to correct his factor levels, his risk should be on par with those without the von Willebrande's disease.  Of note, patient was seen by Dr. Ma, Dr. Riley's partner and the following was documented in the chart dated 9/6/2018.    Assessment:  In summary, Dion Villavicencio is a 57 year old man with refractory Parkinson's Disease and Type 1 vWD with mild bleeding history.  Despite this history, I do think that he is at high risk for bleeding with placement of a DBS device without factor replacement.  Explained that the duration the factor replacement for this, surgery is not known, but my estimation is that one week of therapy is likely sufficient.  In reviewing his von Willebrand panel he is very unlikely to achieve normal von Willebrand factor levels with use of estimate and certainly the duration of elevated levels would not be adequate with this therapy.  Therefore I recommended use of factor replacement therapy and I think of the choices available Humate-P is a reasonable option.  I explained that her goal is to increase the levels for adequate hemostasis without creating a greatly elevated factor VIII which would put him at risk for thrombotic complications from the surgery.     Given this 2-stage procedure with a brief hospital stay, I recommended that he receive a dose of Humate-P based on his baseline factor VIII and von Willebrand factor.  This would occur about 1 hour prior to the surgery and then we would follow his von Willebrand factor activity in the hospital.  This would help us to understand the kinetics of factor given that this is his first exposure.  We would then Humate-P replacement to maintain a goal von Willebrand level of 100% over the course of the next week as an outpatient.  This will require our working with home  infusion service to provide his factor and placement of an indwelling catheter while as an inpatient for him to maintain at home.     Regarding the second stage of the procedure which entails placement of a small device and of the skin to service battery source for the device, I would estimate only a couple days of normal von Willebrand factor levels would be necessary.  We could also supplement this plan with the use of Amicar if required.     Plan:  1. Majority of today's visit was spent counseling the patient regarding treatment of vWD around major surgical procedures.  I did explain that even with achieving our goal factor levels he is still at risk for bleeding as all patients to undergo this procedure are.  2. Humate-P 60 RCo Units/kg x 1 prior to surgery  3. Check vWF Panel (vWF antigen, activity and F8) 1 hour post surgery and at AM draw on post op day 1.  This will inform his kinetics and help with planning his further Humate doses to keep vWF activity ~100.  He will almost certainly require a second dose of Humate P prior to discharge on post op day 1.  4. We will help arrange for home infusion and for the schedule for factor replacement based on #3.  5. We will make a separate plan for placement of the battery pack device based on experience with his first procedure.     The patient is given our center's contact information and is instructed to call if he should have any further questions or concerns.  Otherwise, we will plan on seeing him back annually and as needed for surgical planning.      Therefore, with even the above plan, we discussed that there is an elevated surgical risk based on his von Willebrand's disease.  It is important that Mr. Villavicencio understands the elevated risks and weight them against the potential benefits of surgery.  He had number of questions and they were all answered, mostly related to the bleeding risk and the surgical plan to mitigate or minimize the risk.  At this time,  however, after thorough discussion, patient would like to proceed with the surgery.    Second, from the surgical standpoint, we could plan the surgeries in such a way to minimize his overall risk, including risks related to transfusion and to minimize hospitalization.  One consideration is that we could perform bilateral implantation of the phase I in one day.  This could be followed by phase II surgery in a relatively short time period, since phase II surgery is superficial in nature.  Another consideration is to shorten the surgical time and also to minimize the number of penetration with the cannulas and electrodes during the microelectrode recording.      Considering all the above, the following is tentatively planned.    1.  Patient will be admitted day before the surgery, likely Monday, in preparation for Humate-P 60 RCo Units/Kg prior to surgery.  2.  Plan for bilateral deep brain stimulator placement, phase I, placement of bilateral deep brain stimulator electrodes, target bilateral subthalamic nucleus, with microelectrode recording, on Tuesday.  The surgery will likely be about 8 hours long.  3.  Check vWF Panel (vWF antigen, activity and F8) 1 hour post surgery and at AM draw on post op day 1, Wednesday.  4.  He will need second dose of Humate P on post op day 1, Wednesday.  5.  Another Humate P dose on post op day 2, Thursday and discharge patient to outpatient.   6.  Patient will go to  as an outpatient and undergo phase II generator/battery placement.  7.  Go home for home infusion.    Alternate plan is, if we cannot discharge him to outpatient is this.  1.  Patient will be admitted day before the surgery, likely Monday, in preparation for Humate-P 60 RCo Units/Kg prior to surgery.  2.  Plan for bilateral deep brain stimulator placement, phase I, placement of bilateral deep brain stimulator electrodes, target bilateral subthalamic nucleus, with microelectrode recording, on Tuesday.  The surgery will  likely be about 8 hours long.  3.  Check vWF Panel (vWF antigen, activity and F8) 1 hour post surgery and at AM draw on post op day 1, Wednesday.  4.  He will need second dose of Humate P on post op day 1, Wednesday.  5.  Another Humate P dose on post op day 2, Thursday and take him to surgery for phase II surgery as an inpatient.   6.  Check vWF Panel (vWF antigen, activity and F8) 1 hour post surgery and at AM draw on post op day 1, Friday.  7.  He will need another dose of Humate P on post op day 1, Friday, before the final discharge.  8.  Go home for home infusion.    Backup plan, if we are not able to complete bilateral implantation at step 2 on Tuesday is the following.  1.  Patient will be admitted day before the surgery, likely Monday, in preparation for Humate-P 60 RCo Units/Kg prior to surgery.  2.  Plan for bilateral deep brain stimulator placement, phase I, placement of bilateral deep brain stimulator electrodes, target bilateral subthalamic nucleus, with microelectrode recording, on Tuesday.  The surgery will likely be about 8 hours long.  If we cannot complete bilateral implantation for whatever reason, we will just do one side, left side.  3.  Check vWF Panel (vWF antigen, activity and F8) 1 hour post surgery and at AM draw on post op day 1, Wednesday.  4.  He will need second dose of Humate P on post op day 1, Wednesday.  5.  Another Humate P dose on post op day 2, Thursday and take him to surgery for phase II surgery as an inpatient.   6.  Check vWF Panel (vWF antigen, activity and F8) 1 hour post surgery and at AM draw on post op day 1, Friday.  7.  He will need another dose of Humate P on post op day 1, Friday.  8.  Another Humate P dose on post op day 2, Saturday, 1 hour prior to his second side phase I surgery, right side surgery.  9.  Check vWF Panel (vWF antigen, activity and F8) 1 hour post surgery and at AM draw on post op day 1, Sunday.  10.  He will need second dose of Humate P on post op  day 1, Sunday, before his final discharge.  11.  Go home for home infusion.    I explained that programming will occur a month after surgery to allow time for healing.  Medications will not be changed during this time.  Long-term reduction of medication usage is a possibility.     We did briefly discuss the device type being recommended.  I did confirm that we are recommending Abbott device as the implant.  Patient was in agreement.    I also answered his question about the use of his CPAP mask and if this will be an issue after surgery.  This mask requires a strap that goes over the head.  Although this may be over the incision, its use should be ok after the surgery.      He will also have a tooth extraction performed before our surgery in July, 2019, which is the tentative date.    Risks, benefits, alternative therapies were discussed with the patient, including but not limited to infection and bleeding (intracranial), injury to the brain, stroke, death, hardware failure and possible need for more surgeries.  We especially discussed the increased risk of intracranial bleeding, despite our attempts at reducing the risk with change in our protocol and transfusions.  Surgical procedure was discussed in detail.  All questions were answered, and he expressed understanding.       PLAN  1.  Plan for bilateral deep brain stimulator placement, phase I, placement of bilateral deep brain stimulator electrodes, target bilateral subthalamic nucleus, with microelectrode recording.  2.  Humate P transfusion as planned above - will need approval of Hematology/Oncology.  3.  Plan for phase II as an inpatient if needed or outpatient immediately after discharge from the hospital.  4.  Possibly obtain MRI brain with 7T MRI, if patient is agreeable to research protocol.    I, Tatyana Marquis, am serving as a scribe to document services personally performed by Sidney Cardozo MD, PhD, based upon my observations and the provider's  statements to me. All documentation has been reviewed and edited by the aforementioned doctor prior to being entered into the official medical record.    I, Sidney Cardozo, attest that above named individual is acting in scribe capacity, has observed my performance of the services and has documented them in accordance with my direction. The documentation recorded by the scribe accurately reflects the service I personally performed and the decisions made by me. The document was also partially recorded by me and the entire document was edited by me as well.     65 minutes were spent face to face with the patient of which more than 50% of the time was spent counseling and discussing the above issues regarding medical and surgical plan before, during and after his DBS surgery.

## 2019-01-07 NOTE — NURSING NOTE
Chief Complaint   Patient presents with     DBS Evaluation     [facility] RETURN DBS      Greg Oseguera, EMT

## 2019-01-15 ENCOUNTER — PATIENT OUTREACH (OUTPATIENT)
Dept: NEUROSURGERY | Facility: CLINIC | Age: 59
End: 2019-01-15

## 2019-01-15 NOTE — PROGRESS NOTES
Returned pt's call to review the proposed timeline of his bilateral DBS surgeries.    Dr. Cardozo suggested the following timeline to pt's hematology providers and is waiting for confirmation:    1.  Patient will be admitted on 7/8/19, the day before the surgery, likely Monday, in preparation for Humate-P 60 RCo Units/Kg prior to surgery.   2.  Plan for bilateral deep brain stimulator placement, phase I, placement of bilateral deep brain stimulator electrodes, target bilateral subthalamic nucleus, with microelectrode recording, on Tuesday July 9.  The surgery will likely be about 8 hours long.   3.  Check vWF Panel (vWF antigen, activity and F8) 1 hour post surgery and at AM draw on post op day 1, Wednesday.   4.  He will need second dose of Humate P on post op day 1, Wednesday.   5.  Another Humate P dose on post op day 2, Thursday July 11 and discharge patient to outpatient.   6.  Patient will go to  as an outpatient and undergo phase II generator/battery placement on July 11.   7.  Go home for home infusion.     As noted, Dr. Cardozo is awaiting confirmation from pt's hematology team. There were some alternate timelines suggested as well. Pt indicates he is having a tooth extraction coming up, which may be a good test of how he does in the context of von Willebrand's.     Per pt request, I will send him a copy of our DBS class powerpoint. He attended a DBS class at [place].     No further questions at this time. I will update pt as I learn more about the specific plan. He is in agreement.

## 2019-01-17 ENCOUNTER — DOCUMENTATION ONLY (OUTPATIENT)
Dept: HEMATOLOGY | Facility: CLINIC | Age: 59
End: 2019-01-17

## 2019-01-17 ENCOUNTER — PATIENT OUTREACH (OUTPATIENT)
Dept: NEUROSURGERY | Facility: CLINIC | Age: 59
End: 2019-01-17

## 2019-01-17 NOTE — PROGRESS NOTES
Returned pt's call. He has questions about the cost of DBS surgery and the cost of the infusion plan and inpatient status after the initial DBS lead placement surgery. I let pt know that I would be sending a message to Ilda Toney, [facility] financial counselor and she would f/u with him. Because the plan isn't completely solidified, I'm not sure if she will have all of that information, but she should be able to help him estimate the cost of the surgeries. No further questions at this time. Pt is welcome to call or send [employer] messages with any further questions.     Message sent to Ilda Toney with request for her to f/u with pt.

## 2019-01-17 NOTE — PROGRESS NOTES
[facility]  [address] [place] [id number]  Main: [phone], Fax: [phone]    Hematological treatment plan for his upcoming one tooth extraction procedure (Date is to be determined).    Patient: Dion Villavicencio  MRN: [id number]  : 1960  Date of this note written: 2019    Brief history:  Dion is a 58 year old male with a history of Parkinson's disease who also has a history of severe Type I Von Willebrand Disease with his baseline VWF:Ag of 15% and VWF:Activities at <20%, who is in need to have one tooth extraction procedure done. This procedure is going to be performed at [facility]. The date of this procedure is still needs to be determined.     Hematological Treatment Plan for this upcoming one tooth extraction procedure:  1. Patient will need one single dose of Humate-P at 60 RCo Units/kg (about 6180 RCo Units) +/- 10% IV bolus at 4 mL/min just 30-90 minutes prior to his scheduled procedure. We can arrange for the patient to receive this dose of Humate-P infusion here at our clinic prior to his procedure, for which it might not logistically possible it is about 45 minutes from [place]. Another option is to have him receive the dose of Humate-P infusion at an outpatient infusion center at [facility]. Once a date of the procedure is scheduled, we can help coordinate the Humate-P infusion. Preferably he will have his procedure scheduled in the late morning or early afternoon. Also would prefer that he undergo the procedure earlier during the week so that if he should have any bleeding complications, he can potentially come into our clinic on a work week to obtain repeat dosing of Humate-P if necessary.   2. Will also have him take Amicar Syrup at 3 grams swish and swallow Q 8 hours for 10 days post procedurally. We will  provide him with this prescription once the procedure date is confirmed.    The above plan has been discussed with Dr. Anish Ma, staff hematologist. He agrees with the above plan.       Vern Ding PA-C, MPAS  Physician Assistant  [facility].

## 2019-01-18 ENCOUNTER — TELEPHONE (OUTPATIENT)
Dept: HEMATOLOGY | Facility: CLINIC | Age: 59
End: 2019-01-18

## 2019-01-18 NOTE — TELEPHONE ENCOUNTER
I called [facility] and spoke to pharmacist, Diann to inquire about their availability to order Humate for his upcoming procedure. She looked in all of their ordering options, and it came up not available to order. I asked if she could have the  check if they can purchase from [employer] or [facility]. She will have the  call me back next week.

## 2019-01-18 NOTE — TELEPHONE ENCOUNTER
1/18/19  Dion has an upcoming dental extraction on February 21st at 1030 am at the [facility] in [place]. Pt called this RN for plan regarding his VonWillebrand's disease and necessity for bleeding precautions during procedure. RN conferred with BETH Carrasquillo to orchestrate plan. Plan is for pt to be infused with Humate-p at the outpatient infusion center at the [facility] in [place] prior to procedure and to take Amicar for 10 days post procedure. RN is in contact with [facility], working on obtaining insurance information to go ahead with scheduling. RN will contact patient when final appointments are made.    [facility] information:  [address], [place] [address] [phone]      Becky Sanchez RN - Nurse Clinician - [facility] - [phone]

## 2019-01-24 NOTE — TELEPHONE ENCOUNTER
I spoke to Ulisses, Nurse manager at the Infusion Center at [facility] and let her know that I spoke to Sharon in the inpatient pharmacy and they are able to get the Humate.     Dayo said they do not secure benefits, or obtain any auths needed, that we would have to do that. Once approved, send infusion orders via fax to [phone].     Any questions call Dayo at [phone]    I spoke to Osmar at [facility], this medication does not require a prior auth for his diagnosis. He has a $2700 ind deductible and OOP max, nothing met so far. He's verifying coverage information for this location at [place] and will call me back directly.

## 2019-01-28 NOTE — TELEPHONE ENCOUNTER
I spoke to Osmar at [facility] again, the Humate does require a Prior auth in order to be administered in a professional setting (not at home). Faxed in the paperwork for the PA. Will follow up in a few days.

## 2019-01-29 DIAGNOSIS — D68.00 VON WILLEBRAND'S DISEASE (H): Primary | ICD-10-CM

## 2019-01-29 RX ORDER — AMINOCAPROIC ACID 0.25 G/ML
3 SYRUP ORAL SEE ADMIN INSTRUCTIONS
Qty: 360 ML | Refills: 0 | Status: SHIPPED | OUTPATIENT
Start: 2019-02-21 | End: 2019-06-24

## 2019-01-29 NOTE — TELEPHONE ENCOUNTER
PA approved.  Effective dates: 01/28/2019 to 03/21/19  Case #[id number]  Auth# [id number]

## 2019-01-29 NOTE — PROGRESS NOTES
[facility]  [address] [place] [address]  Main: [phone], Fax: [phone]    Hematological treatment order for this patient's upcoming one tooth extraction oral surgery.    Patient: Dion Villavicencio  MRN: [id number]  : 1960  Date of this order written: 2019    Brief History:  Dion is a 58 year old male with a history of Parkinson's disease who also has a history of severe Type I Von Willebrand Disease with his baseline VWF:Ag of 15% and VWF:Activities at <20%, who is in need to have one tooth extraction procedure done. This procedure is going to be performed at [facility] in [place] which is affiliated with [facility] on 2019.    Order for [facility] Center:  1. Please infuse Humate-P at 60 RCo Units/kg (about 6180 RCo Units) +/- 10% IV bolus at 4 mL/min just 30-90 minutes prior to his scheduled dental extraction procedure.       Vern Ding PA-C, MPAS  Physician Assistant  [facility].

## 2019-02-01 ENCOUNTER — TELEPHONE (OUTPATIENT)
Dept: HEMATOLOGY | Facility: CLINIC | Age: 59
End: 2019-02-01

## 2019-02-01 NOTE — TELEPHONE ENCOUNTER
RN called patient to relay upcoming plan for oral procedure.     Plan:    Patient is to arrive at [facility] ([address] [place] [address]) outpatient infusion center on the second floor ([phone]) at 9:30am on 2/21/19. Here the patient will receive an infusion of Humate-P. Orders and PA have been filed and faxed. Infusion should take around 15 minutes. Patient will then walk across the street and check in at [facility] ([address], [place] [address]) by 10:15am to check in for 10:30am procedure. Following the procedure the patient will take Amicar Syrup, 3 grams swish and swallow, q8 hours for 10 days. The script for Amicar will be sent to patient' preferred pharmacy and will need to be picked up prior to the procedure.      RN was unable to get a hold of patient. Message was left for patient to return call at his earliest convenience.     2/18/19  RN was able to speak with Dion Villavicencio, he is aware and agreeable with plan. Will call with any questions, comments or concerns.     Becky Sanchez RN - Nurse Clinician - [facility] - [phone]

## 2019-02-01 NOTE — TELEPHONE ENCOUNTER
RN called [facility] ([phone]) and spoke with Haley, a RN . Dion Villavicencio is now scheduled for an infusion on 2/21/19 at 9:30am approximately 60 minutes before his oral procedure. Prior authorization has been filed and accepted, orders have been faxed and pharmacy has been contacted. Haley at the infusion center was given our number and will call if she has questions about administration. This RN will contact patient to verify plan for procedure.     Becky Sanchez RN - Nurse Clinician - [facility] - [phone]

## 2019-02-26 ENCOUNTER — TELEPHONE (OUTPATIENT)
Dept: HEMATOLOGY | Facility: CLINIC | Age: 59
End: 2019-02-26

## 2019-02-26 NOTE — TELEPHONE ENCOUNTER
[facility]  [address] [place] [address] [address] [id number]  Main: [phone], Fax: [phone]    Telephone Note:    Patient: Dion Villavicencio  MRN: [id number]  : 1960  Date of this note written:  2019    This writer spoke with patient today to obtain an update after his one tooth extraction procedure done back on 2019. He reports that he is doing well without any bleeding complications. He is inquiring if he can stop Amicar prior to the 10 days time.     I instruct the patient to continue Amicar at least until 2019, then he can stop it after that as long as he has no bleeding issues.      Vern Ding PA-C, MPAS  Physician Assistant  [facility] for [facility].

## 2019-03-04 ENCOUNTER — MYC MEDICAL ADVICE (OUTPATIENT)
Dept: NEUROSURGERY | Facility: CLINIC | Age: 59
End: 2019-03-04

## 2019-03-18 ENCOUNTER — MYC MEDICAL ADVICE (OUTPATIENT)
Dept: NEUROSURGERY | Facility: CLINIC | Age: 59
End: 2019-03-18

## 2019-03-20 DIAGNOSIS — G20.A1 PARKINSON'S DISEASE (H): Primary | ICD-10-CM

## 2019-03-20 DIAGNOSIS — D68.00 VON WILLEBRAND'S DISEASE (H): ICD-10-CM

## 2019-03-21 ENCOUNTER — HOSPITAL ENCOUNTER (INPATIENT)
Facility: CLINIC | Age: 59
Setting detail: SURGERY ADMIT
DRG: 026 | End: 2019-03-21
Attending: NEUROLOGICAL SURGERY | Admitting: NEUROLOGICAL SURGERY
Payer: COMMERCIAL

## 2019-03-21 ENCOUNTER — HOSPITAL ENCOUNTER (OUTPATIENT)
Facility: CLINIC | Age: 59
End: 2019-03-21
Attending: NEUROLOGICAL SURGERY | Admitting: NEUROLOGICAL SURGERY
Payer: COMMERCIAL

## 2019-04-03 DIAGNOSIS — G20.A1 PARKINSON'S DISEASE (H): Primary | ICD-10-CM

## 2019-06-05 ENCOUNTER — PATIENT OUTREACH (OUTPATIENT)
Dept: NEUROSURGERY | Facility: CLINIC | Age: 59
End: 2019-06-05

## 2019-06-05 DIAGNOSIS — D68.00 VON WILLEBRAND'S DISEASE (H): Primary | ICD-10-CM

## 2019-06-05 NOTE — PROGRESS NOTES
Returned pt's call. I let him know that I will be sending a preop packet of information about preparing for surgery, which will include times/dates for all appointments and procedures. Pt understands he will be admitted to the hospital the day before surgery for the Humate P infusion, and this information will be included in the packet as well.     Let pt know that his PAC appt is scheduled for 6/24/19 at 9:30 a.m.    Pt had questions about postoperative activity restrictions:    1. No lifting/pushin/pulling anything heavier than 8-10 lb until 2 weeks from date of surgery. Removing or modifying these restrictions will be made at his post-operative clinic appointment.     2. Moderate activity, like short, frequent walks are recommended. Gradually increase activity as tolerated.     3. Do not submerge or soak incisions for 4-6 weeks from date of surgery. All incisions must be fully healed and scabbing gone.     Pt expressed understanding. No further questions at this time.

## 2019-06-12 ENCOUNTER — PRE VISIT (OUTPATIENT)
Dept: SURGERY | Facility: CLINIC | Age: 59
End: 2019-06-12

## 2019-06-12 NOTE — TELEPHONE ENCOUNTER
FUTURE VISIT INFORMATION      SURGERY INFORMATION:    Date: 7/9/19, 7/11/19    Location: [facility] OR    Surgeon:  Sidney Cardozo    Anesthesia Type:  Combined MAC with Local    RECORDS REQUESTED FROM:       Primary Care Provider: Ritesh Knutson- [facility] [employer]    Pertinent Medical History: OCTAVIO

## 2019-06-14 ENCOUNTER — DOCUMENTATION ONLY (OUTPATIENT)
Dept: NEUROSURGERY | Facility: CLINIC | Age: 59
End: 2019-06-14

## 2019-06-19 ENCOUNTER — TELEPHONE (OUTPATIENT)
Dept: NEUROSURGERY | Facility: CLINIC | Age: 59
End: 2019-06-19

## 2019-06-19 NOTE — TELEPHONE ENCOUNTER
Pt received preop packet and would like to schedule a time to discuss it when his wife will also be present. We will meet at around 10:30 on 6/24 following pt's PAC appt that morning. Pt in agreement with plan. Pt has my contact information should he have additional questions.

## 2019-06-23 ENCOUNTER — ANESTHESIA EVENT (OUTPATIENT)
Dept: SURGERY | Facility: CLINIC | Age: 59
DRG: 026 | End: 2019-06-23
Payer: COMMERCIAL

## 2019-06-24 ENCOUNTER — OFFICE VISIT (OUTPATIENT)
Dept: SURGERY | Facility: CLINIC | Age: 59
End: 2019-06-24
Payer: COMMERCIAL

## 2019-06-24 ENCOUNTER — PATIENT OUTREACH (OUTPATIENT)
Dept: NEUROSURGERY | Facility: CLINIC | Age: 59
End: 2019-06-24

## 2019-06-24 VITALS
BODY MASS INDEX: 34.22 KG/M2 | HEIGHT: 68 IN | RESPIRATION RATE: 18 BRPM | SYSTOLIC BLOOD PRESSURE: 124 MMHG | TEMPERATURE: 97.8 F | DIASTOLIC BLOOD PRESSURE: 85 MMHG | WEIGHT: 225.8 LBS | OXYGEN SATURATION: 96 % | HEART RATE: 88 BPM

## 2019-06-24 DIAGNOSIS — G20.A1 PARKINSON'S DISEASE (H): ICD-10-CM

## 2019-06-24 DIAGNOSIS — Z01.818 PRE-OP EXAMINATION: Primary | ICD-10-CM

## 2019-06-24 DIAGNOSIS — Z01.818 PREOPERATIVE EVALUATION TO RULE OUT SURGICAL CONTRAINDICATION: Primary | ICD-10-CM

## 2019-06-24 LAB
ABO + RH BLD: NORMAL
ABO + RH BLD: NORMAL
ALBUMIN UR-MCNC: NEGATIVE MG/DL
ANION GAP SERPL CALCULATED.3IONS-SCNC: 5 MMOL/L (ref 3–14)
APPEARANCE UR: CLEAR
APTT PPP: 35 SEC (ref 22–37)
BILIRUB UR QL STRIP: NEGATIVE
BLD GP AB SCN SERPL QL: NORMAL
BLOOD BANK CMNT PATIENT-IMP: NORMAL
BLOOD BANK CMNT PATIENT-IMP: NORMAL
BUN SERPL-MCNC: 10 MG/DL (ref 7–30)
CALCIUM SERPL-MCNC: 8.8 MG/DL (ref 8.5–10.1)
CHLORIDE SERPL-SCNC: 109 MMOL/L (ref 94–109)
CO2 SERPL-SCNC: 26 MMOL/L (ref 20–32)
COLOR UR AUTO: YELLOW
CREAT SERPL-MCNC: 0.73 MG/DL (ref 0.66–1.25)
ERYTHROCYTE [DISTWIDTH] IN BLOOD BY AUTOMATED COUNT: 12 % (ref 10–15)
GFR SERPL CREATININE-BSD FRML MDRD: >90 ML/MIN/{1.73_M2}
GLUCOSE SERPL-MCNC: 110 MG/DL (ref 70–99)
GLUCOSE UR STRIP-MCNC: NEGATIVE MG/DL
HCT VFR BLD AUTO: 48.9 % (ref 40–53)
HGB BLD-MCNC: 15.8 G/DL (ref 13.3–17.7)
HGB UR QL STRIP: NEGATIVE
INR PPP: 1.03 (ref 0.86–1.14)
KETONES UR STRIP-MCNC: 5 MG/DL
LEUKOCYTE ESTERASE UR QL STRIP: NEGATIVE
MCH RBC QN AUTO: 31 PG (ref 26.5–33)
MCHC RBC AUTO-ENTMCNC: 32.3 G/DL (ref 31.5–36.5)
MCV RBC AUTO: 96 FL (ref 78–100)
MUCOUS THREADS #/AREA URNS LPF: PRESENT /LPF
NITRATE UR QL: NEGATIVE
PH UR STRIP: 6 PH (ref 5–7)
PLATELET # BLD AUTO: 223 10E9/L (ref 150–450)
POTASSIUM SERPL-SCNC: 4.1 MMOL/L (ref 3.4–5.3)
RBC # BLD AUTO: 5.09 10E12/L (ref 4.4–5.9)
RBC #/AREA URNS AUTO: 2 /HPF (ref 0–2)
SODIUM SERPL-SCNC: 140 MMOL/L (ref 133–144)
SOURCE: ABNORMAL
SP GR UR STRIP: 1.02 (ref 1–1.03)
SPECIMEN EXP DATE BLD: NORMAL
SQUAMOUS #/AREA URNS AUTO: <1 /HPF (ref 0–1)
UROBILINOGEN UR STRIP-MCNC: 0 MG/DL (ref 0–2)
WBC # BLD AUTO: 6.3 10E9/L (ref 4–11)
WBC #/AREA URNS AUTO: 7 /HPF (ref 0–5)

## 2019-06-24 ASSESSMENT — MIFFLIN-ST. JEOR: SCORE: 1831.9

## 2019-06-24 ASSESSMENT — LIFESTYLE VARIABLES: TOBACCO_USE: 0

## 2019-06-24 NOTE — PROGRESS NOTES
Pre-op Teaching            Pre-op folder with specific written instructions    Admission for infusion 7/8/19 at 9:00  Bilateral DBS lead placement and battery placement 7/9/19 at 8:00 a.m. and 7/11/19 1:00 p.m.  Dr. Cardozo    Discussed pre-op routine and requirements to include:  surgical procedure, post-op recovery and expectations, need for H&P/PAC (completed), NPO prior to OR, pre-op antibacterial showers, pain control and importance of follow-up visits.  Surgery scheduling will coordinate OR time/date and update patient as appropriate.  3C will call with more instructions 24-48 hour pre-op.   Ample time was provided for patient questions and in-depth discussion of topics of heightened interest.  Antibacterial soap solution was given to patient as well as specific instructions for use.  Patient/spouse verbalized understanding of instructions.  Approximately 25 minutes spent with patient and family discussing and reviewing.

## 2019-06-24 NOTE — ANESTHESIA PREPROCEDURE EVALUATION
Anesthesia Pre-Procedure Evaluation    Patient: Dion Villavicencio   MRN:     [id number] Gender:   male   Age:    58 year old :      1960        Preoperative Diagnosis: Parkinson's Disease, Von Willebrand's Disease   Procedure(s):  Stealth Assisted Bilateral Deep Brain Stimulator Placement, Phase I, Placement Of Bilateral Deep Brain Stimulator Electrode, Target Bilateral Subthalamic Nucleus With Microelectrode Recording     Past Medical History:   Diagnosis Date     Encounter for neuropsychological testing 2018    IMPRESSIONS AND RECOMMENDATIONS   Current results indicate impairments in learning, which are somewhat variable, and memory that falls well within normal limits. Language, visual processing, complex attentional processing, and executive functioning fall within normal limits, generally in the above average range. He denied experiencing significant depressive symptomatology, apathy, or anxiety.   Th     Esophageal reflux 2018     H/O magnetic resonance imaging of brain and brain stem 2018    MR BRAIN W/O & W CONTRAST 2018 2:30 PM  History: ; Parkinson's disease (H) ICD-10: Parkinson's disease (H)  Comparison:  3/14/2006    Technique: Midline sagittal T1-weighted images and axial diffusion with images through the whole brain were obtained. Axial and coronal T2-weighted images, and inversion recovery images were obtained with focus on the evans through the corpus callosum, obtained      Hx of von Willebrand's disease 2018     Parkinson disease (H) 2018      Past Surgical History:   Procedure Laterality Date     C ORAL SURGERY PROCEDURE  1986    wisdom teeth surgery          Anesthesia Evaluation     . Pt has had prior anesthetic. Type: General    No history of anesthetic complications          ROS/MED HX    ENT/Pulmonary:     (+)sleep apnea, uses CPAP , . .   (-) tobacco use   Neurologic:     (+)Parkinson's disease     Cardiovascular: Comment: Denies any cardiac history.  Denies  chest pain, SOB, palpitations, syncope, SANTILLAN, orthopnea, or PND.      (+) ----. : . . . :. . No previous cardiac testing       METS/Exercise Tolerance: Comment: 7,000-10,000 steps daily.  >4 METS   Hematologic: Comments: von Willebrand's disease - remote h/o epistaxis when he was a kid.  Both his dad and son have von Willebrand's disease.    (+) History of Transfusion (Blood product as it relates to von Willibrand's disease. ) no previous transfusion reaction -      Musculoskeletal:  - neg musculoskeletal ROS       GI/Hepatic: Comment: GERD symptoms resolved years ago.        (-) GERD   Renal/Genitourinary:     (+) Nephrolithiasis ,       Endo:     (+) Obesity (BMI>30), .      Psychiatric:  - neg psychiatric ROS       Infectious Disease:  - neg infectious disease ROS       Malignancy:      - no malignancy   Other:    (+) No chance of pregnancy C-spine cleared: Yes, no H/O Chronic Pain,no other significant disability                        PHYSICAL EXAM:   Mental Status/Neuro: A/A/O   Airway: Facies: Feasible  Mallampati: II  Mouth/Opening: Full  TM distance: > 6 cm  Neck ROM: Full   Respiratory: Auscultation: CTAB     Resp. Rate: Normal     Resp. Effort: Normal      CV: Rhythm: Regular  Rate: Age appropriate  Heart: Normal Sounds   Comments:      Dental: Normal                  No results found for: WBC, HGB, HCT, PLT, CRP, SED, NA, POTASSIUM, CHLORIDE, CO2, BUN, CR, GLC, ELYSIA, PHOS, MAG, ALBUMIN, PROTTOTAL, ALT, AST, GGT, ALKPHOS, BILITOTAL, BILIDIRECT, LIPASE, AMYLASE, TOREY, PTT, INR, FIBR, TSH, T4, T3, HCG, HCGS, CKTOTAL, CKMB, TROPN    Preop Vitals  BP Readings from Last 3 Encounters:   01/07/19 (!) 136/94   09/06/18 136/88   08/27/18 (!) 148/100    Pulse Readings from Last 3 Encounters:   01/07/19 100   09/06/18 90   08/27/18 95      Resp Readings from Last 3 Encounters:   01/07/19 18   09/06/18 14   08/27/18 18    SpO2 Readings from Last 3 Encounters:   01/07/19 94%   09/06/18 95%   08/27/18 95%      Temp  "Readings from Last 1 Encounters:   01/07/19 98.5  F (36.9  C) (Oral)    Ht Readings from Last 1 Encounters:   01/07/19 1.734 m (5' 8.25\")      Wt Readings from Last 1 Encounters:   01/07/19 102.7 kg (226 lb 8 oz)    Estimated body mass index is 34.19 kg/m  as calculated from the following:    Height as of 1/7/19: 1.734 m (5' 8.25\").    Weight as of 1/7/19: 102.7 kg (226 lb 8 oz).     LDA:            Assessment:   ASA SCORE: 3    NPO Status: > 6 hours since completed [employer]   Documentation: H&P complete; Preop Testing complete; Consents complete   Proceeding: Proceed without further delay  Tobacco Use:  NO Active use of Tobacco/UNKNOWN Tobacco use status     Plan:   Anes. Type:  MAC      Induction:  Not applicable   Airway: Native Airway   Access/Monitoring: PIV   Maintenance: N/a   Emergence: N/a   Logistics: ICU Admission     Postop Pain/Sedation Strategy:  Standard-Options: Opioids PRN     PONV Management:  Adult Risk Factors:, Non-Smoker, Postop Opioids     CONSENT: Direct conversation   Plan and risks discussed with: Patient   Blood Products: N/a                  PAC Discussion and Assessment    ASA Classification: 3  Case is suitable for: [place]  Anesthetic techniques and relevant risks discussed: GA  Invasive monitoring and risk discussed:   Types:   Possibility and Risk of blood transfusion discussed:   NPO instructions given:   Additional anesthetic preparation and risks discussed:   Needs early admission to pre-op area:   Other:     PAC Resident/NP Anesthesia Assessment:  Dion Villavicencio is a 58-year-old male scheduled for Stealth Assisted Bilateral Deep Brain Stimulator Placement, Phase I, Placement Of Bilateral Deep Brain Stimulator Electrode, Target Bilateral Subthalamic Nucleus With Microelectrode Recording on 7/9/2019 with Dr. Cardozo at the [facility] under monitored anesthesia care and local.  Mr. Villavicencio has a history of Parkinson's disease that was diagnosed in 2006 and Type I Von Willebrand Disease.  He " "has noticed worsening tremors that have been refractory to medical therapy.  He followed up with Dr. Cardozo on 1/7/19 for ongong DBS placement evaluation.  Per Dr. Cardozo's note, Mr. Villavicencio's  case was discussed at the Movement Disorder Consensus Group Meeting and he was considered a candidate in terms of DBS, bilateral candidate using Abbott system.  He was seen in consultation with hematology on 9/6/18 and per their note,\" Majority of today's visit was spent counseling the patient regarding treatment of vWD around major surgical procedures.  I (Dr. Ma) did explain that even with achieving our goal factor levels he is still at risk for bleeding as all patients to undergo this procedure are\".  Through the evaluations with bot neurosurgery and hematology, the above procedure was recommended.         He has the following specific operative considerations:   - METS:  >4. RCRI : No serious cardiac risks.  0.4 % risk of major adverse cardiac event.  No further cardiac evaluation needed per 2014 ACC/AHA guidelines for non-cardiac surgery.   - Treated  OCTAVIO :  Instructed to bring CPAP with DOS.  - VTE risk:  0.5%.  Von Willebrands disease.   - Risk of PONV score = 1-2.  If > 2, anti-emetic intervention recommended.      #  Cardiology - denies cardiac history or symptoms.     #  Pulmonary - denies smoking hx  #  Hematology    -   Von Willebrands's disease: Per Dr. Ma's note from 9/6/2018 regarding instructions for surgery:   \"Assessment:  In summary, Dion Villavicencio is a 57-year-old man with refractory Parkinson's Disease and Type 1 vWD with mild bleeding history.  Despite this history, I do think that he is at high risk for bleeding with placement of a DBS device without factor replacement.  Explained that the duration the factor replacement for this, surgery is not known, but my estimation is that one week of therapy is likely sufficient.  In reviewing his von Willebrand panel he is very unlikely to achieve normal von " Willebrand factor levels with use of estimate and certainly the duration of elevated levels would not be adequate with this therapy.  Therefore, I recommended use of factor replacement therapy and I think of the choices available Humate-P is a reasonable option.  I explained that her goal is to increase the levels for adequate hemostasis without creating a greatly elevated factor VIII which would put him at risk for thrombotic complications from the surgery.     Given this 2-stage procedure with a brief hospital stay, I recommended that he receive a dose of Humate-P based on his baseline factor VIII and von Willebrand factor.  This would occur about 1 hour prior to the surgery and then we would follow his von Willebrand factor activity in the hospital.  This would help us to understand the kinetics of factor given that this is his first exposure.  We would then Humate-P replacement to maintain a goal von Willebrand level of 100% over the course of the next week as an outpatient.  This will require our working with home infusion service to provide his factor and placement of an indwelling catheter while as an inpatient for him to maintain at home.     Regarding the second stage of the procedure which entails placement of a small device and of the skin to service battery source for the device, I would estimate only a couple days of normal von Willebrand factor levels would be necessary.  We could also supplement this plan with the use of Amicar if required.     Plan:  1. Majority of today's visit was spent counseling the patient regarding treatment of vWD around major surgical procedures.  I did explain that even with achieving our goal factor levels he is still at risk for bleeding as all patients to undergo this procedure are.  2. Humate-P 60 RCo Units/kg x 1 prior to surgery  3. Check vWF Panel (vWF antigen, activity and F8) 1 hour post surgery and at AM draw on post op day 1.  This will inform his kinetics and  "help with planning his further Humate doses to keep vWF activity ~100.  He will almost certainly require a second dose of Humate P prior to discharge on post op day 1.  4. We will help arrange for home infusion and for the schedule for factor replacement based on #3.  5. We will make a separate plan for placement of the battery pack device based on experience with his first procedure.     The patient is given our center's contact information and is instructed to call if he should have any further questions or concerns.  Otherwise, we will plan on seeing him back annually and as needed for surgical planning\".    #  Neuro    - Parkinson's disease with progressing tremors that have been refractory to medical therapy.  Above procedure planned.    Per Dr. Cardozo's note 1/7/19, \"Considering all the above, the following is tentatively planned.     1.  Patient will be admitted day before the surgery, likely Monday, in preparation for Humate-P 60 RCo Units/Kg prior to surgery.  2.  Plan for bilateral deep brain stimulator placement, phase I, placement of bilateral deep brain stimulator electrodes, target bilateral subthalamic nucleus, with microelectrode recording, on Tuesday.  The surgery will likely be about 8 hours long.  3.  Check vWF Panel (vWF antigen, activity and F8) 1 hour post surgery and at AM draw on post op day 1, Wednesday.  4.  He will need second dose of Humate P on post op day 1, Wednesday.  5.  Another Humate P dose on post op day 2, Thursday and discharge patient to outpatient.   6.  Patient will go to  as an outpatient and undergo phase II generator/battery placement.  7.  Go home for home infusion.     Alternate plan is, if we cannot discharge him to outpatient is this.  1.  Patient will be admitted day before the surgery, likely Monday, in preparation for Humate-P 60 RCo Units/Kg prior to surgery.  2.  Plan for bilateral deep brain stimulator placement, phase I, placement of bilateral deep brain " stimulator electrodes, target bilateral subthalamic nucleus, with microelectrode recording, on Tuesday.  The surgery will likely be about 8 hours long.  3.  Check vWF Panel (vWF antigen, activity and F8) 1 hour post surgery and at AM draw on post op day 1, Wednesday.  4.  He will need second dose of Humate P on post op day 1, Wednesday.  5.  Another Humate P dose on post op day 2, Thursday and take him to surgery for phase II surgery as an inpatient.   6.  Check vWF Panel (vWF antigen, activity and F8) 1 hour post surgery and at AM draw on post op day 1, Friday.  7.  He will need another dose of Humate P on post op day 1, Friday, before the final discharge.  8.  Go home for home infusion.     Backup plan, if we are not able to complete bilateral implantation at step 2 on Tuesday is the following.  1.  Patient will be admitted day before the surgery, likely Monday, in preparation for Humate-P 60 RCo Units/Kg prior to surgery.  2.  Plan for bilateral deep brain stimulator placement, phase I, placement of bilateral deep brain stimulator electrodes, target bilateral subthalamic nucleus, with microelectrode recording, on Tuesday.  The surgery will likely be about 8 hours long.  If we cannot complete bilateral implantation for whatever reason, we will just do one side, left side.  3.  Check vWF Panel (vWF antigen, activity and F8) 1 hour post surgery and at AM draw on post op day 1, Wednesday.  4.  He will need second dose of Humate P on post op day 1, Wednesday.  5.  Another Humate P dose on post op day 2, Thursday and take him to surgery for phase II surgery as an inpatient.   6.  Check vWF Panel (vWF antigen, activity and F8) 1 hour post surgery and at AM draw on post op day 1, Friday.  7.  He will need another dose of Humate P on post op day 1, Friday.  8.  Another Humate P dose on post op day 2, Saturday, 1 hour prior to his second side phase I surgery, right side surgery.  9.  Check vWF Panel (vWF antigen, activity  "and F8) 1 hour post surgery and at AM draw on post op day 1, Sunday.  10.  He will need second dose of Humate P on post op day 1, Sunday, before his final discharge.  11.  Go home for home infusion\".      - Anesthesia considerations:  Refer to PAC assessment in anesthesia records  - CBC, BMP and T&S today      Arrival time, NPO, shower and medication instructions provided by nursing staff today.  Preparing For Your Surgery handout given.  Patient was discussed with Dr Lr.      Hematology          Reviewed and Signed by PAC Mid-Level Provider/Resident  Mid-Level Provider/Resident: Apolonia GARCIAS CNP  Date: 6/24/19  Time: 10:38    Attending Anesthesiologist Anesthesia Assessment:  STAFF:  58 y.o. man with vWillebrand Type I disease for DBS  by Dr. Cardozo using MAC anesthesia.   History summarized above.  Patient has severe Type I vWillebrand Disease with 15% activity.   Will need  will need dose of Humate-P based on baseline factor VIII and vWillebrand factor baseline levels, generally in the range of 60 RCo Units/kg (about 6180 RCo Units) +/- 10% IV bolus at 4 mL/min just 60 minutes prior to his scheduled procedure. May also consider AMICAR, as guided by Hematology.   Instructions given and questions answered.   Final plans by anesthesiology team on DOS.   ---rcp      Reviewed and Signed by PAC Anesthesiologist  Anesthesiologist: funmi  Date: 6/24/2019  Time:   Pass/Fail: Pass  Disposition:     PAC Pharmacist Assessment:        Pharmacist:   Date:   Time:        DIETER Sanford CNP  "

## 2019-06-24 NOTE — H&P
"  Pre-Operative H & P     CC:  Preoperative exam to assess for increased cardiopulmonary risk while undergoing surgery and anesthesia.    Date of Encounter: 6/24/2019  Primary Care Physician:  Ritesh Knutson  Reason: Parkinson's disease     HPI  Dion iVllavicencio is a 58 year old male who presents for pre-operative H & P in preparation for Stealth Assisted Bilateral Deep Brain Stimulator Placement, Phase I, Placement Of Bilateral Deep Brain Stimulator Electrode, Target Bilateral Subthalamic Nucleus With Microelectrode Recording on 7/9/2019 with Dr. Cardozo at the [facility] under monitored anesthesia care and local.  Mr. Villavicencio has a history of Parkinson's disease that was diagnosed in 2006 and Type I Von Willebrand Disease.  He has noticed worsening tremors that have been refractory to medical therapy.  He followed up with Dr. Cardozo on 1/7/19 for ongong DBS placement evaluation.  Per Dr. Cardozo's note, Mr. Villavicencio's  case was discussed at the Movement Disorder Consensus Group Meeting and he was considered a candidate in terms of DBS, bilateral candidate using Abbott system.  He was seen in consultation with hematology on 9/6/18 and per their note,\" Majority of today's visit was spent counseling the patient regarding treatment of vWD around major surgical procedures.  I (Dr. Ma) did explain that even with achieving our goal factor levels he is still at risk for bleeding as all patients to undergo this procedure are\".  Through the evaluations with both neurosurgery and hematology, the above procedure was recommended.          Mr. Villavicencio presents to PAC with his wife.  He reports that his tremors have become refractory to mediations making the severity of symptoms somewhat unpredictable impacting the quality of his life.  Because of this, he would like to proceed with above surgical intervention.      History is obtained from the patient, electronic health record and patient's spouse.     Past Medical History  Past Medical " History:   Diagnosis Date     Encounter for neuropsychological testing 9/16/2018    IMPRESSIONS AND RECOMMENDATIONS   Current results indicate impairments in learning, which are somewhat variable, and memory that falls well within normal limits. Language, visual processing, complex attentional processing, and executive functioning fall within normal limits, generally in the above average range. He denied experiencing significant depressive symptomatology, apathy, or anxiety.   Th     Esophageal reflux 7/29/2018     H/O magnetic resonance imaging of brain and brain stem 8/31/2018    MR BRAIN W/O & W CONTRAST 8/31/2018 2:30 PM  History: ; Parkinson's disease (H) ICD-10: Parkinson's disease (H)  Comparison:  3/14/2006    Technique: Midline sagittal T1-weighted images and axial diffusion with images through the whole brain were obtained. Axial and coronal T2-weighted images, and inversion recovery images were obtained with focus on the evans through the corpus callosum, obtained      Hx of von Willebrand's disease 7/29/2018     Parkinson disease (H) 7/29/2018       Past Surgical History  Past Surgical History:   Procedure Laterality Date     C ORAL SURGERY PROCEDURE  1986    wisdom teeth surgery       Hx of Blood transfusions/reactions: Blood product as it relates to von Willibrand's disease.Denies reaction.      Hx of abnormal bleeding or anti-platelet use: denies      Steroid use in the last year: denies    Personal or FH with difficulty with Anesthesia:   Denies      Prior to Admission Medications  Current Outpatient Medications   Medication Sig Dispense Refill     amantadine (SYMMETREL) 100 MG capsule Take 100 mg by mouth daily (with lunch) 100mg capsule by mouth daily @ 11am-1230pm 90 capsule 3     carbidopa-levodopa (SINEMET)  MG per tablet Take 1 tablet by mouth 4 times daily 25/100 tab by mouth @ 7-830am, 1pm and 11pm-1230am and extra if needed = 4/day 360 tablet 3     carbidopa-levodopa ER (RYTARY)  36. MG CPCR per CR capsule Take 2-4 capsules by mouth 5 times daily 4 capsules @ 7-830am, 11am-1230p and 3-430pm; and 2 capsules @ 7-830pm and 11pm-1230am       Cholecalciferol (VITAMIN D) 2000 units tablet Take 2,000 Units by mouth every morning 2000 units by mouth daily @ 7-830am 100 tablet 3     Multiple Vitamins-Minerals (CENTRUM SILVER) per tablet Take 1 tablet by mouth every morning Centrum vitamin by mouth daily @ 7-830am 30 tablet      rOPINIRole (REQUIP) 2 MG tablet Take 2 mg by mouth 3 times daily 2mg tab by mouth @ 7-830am, 3-430pm and 11pm-1230am 270 tablet 3       Allergies  Allergies   Allergen Reactions     Penicillins      Hives     Pramipexole      Leg swelling and compulsive behavior       Social History  Social History     Socioeconomic History     Marital status:      Spouse name: Not on file     Number of children: Not on file     Years of education: Not on file     Highest education level: Not on file   Occupational History     Not on file   Social Needs     Financial resource strain: Not on file     Food insecurity:     Worry: Not on file     Inability: Not on file     Transportation needs:     Medical: Not on file     Non-medical: Not on file   Tobacco Use     Smoking status: Never Smoker     Smokeless tobacco: Never Used   Substance and Sexual Activity     Alcohol use: No     Drug use: Never     Sexual activity: Not on file   Lifestyle     Physical activity:     Days per week: Not on file     Minutes per session: Not on file     Stress: Not on file   Relationships     Social connections:     Talks on phone: Not on file     Gets together: Not on file     Attends Buddhist service: Not on file     Active member of club or organization: Not on file     Attends meetings of clubs or organizations: Not on file     Relationship status: Not on file     Intimate partner violence:     Fear of current or ex partner: Not on file     Emotionally abused: Not on file     Physically abused: Not  on file     Forced sexual activity: Not on file   Other Topics Concern     Parent/sibling w/ CABG, MI or angioplasty before 65F 55M? Not Asked   Social History Narrative    . lives in [place]. [place]en spouse    Born in [facility]    Grew up in [facility] till moved to [place] and grew up there from age 7 yrs.     No family history     to [place] since 1987       Family History  Family History   Problem Relation Age of Onset     Eye Disorder Mother         dry eyes ? glaucoma     Diabetes Father      Blood Disease Father         von willendbrand     Skin Cancer Father      Nephrolithiasis Brother      Nephrolithiasis Brother      Blood Disease Son         chung baum       Review  ROS/MED HX    ENT/Pulmonary:     (+)sleep apnea, uses CPAP , . .   (-) tobacco use   Neurologic:     (+)Parkinson's disease     Cardiovascular: Comment: Denies any cardiac history.  Denies chest pain, SOB, palpitations, syncope, SANTILLAN, orthopnea, or PND.      (+) ----. : . . . :. . No previous cardiac testing       METS/Exercise Tolerance: Comment: 7,000-10,000 steps daily.  >4 METS   Hematologic: Comments: von Willebrand's disease - remote h/o epistaxis when he was a kid.  Both his dad and son have von Willebrand's disease.    (+) History of Transfusion (Blood product as it relates to von Willibrand's disease. ) no previous transfusion reaction -      Musculoskeletal:  - neg musculoskeletal ROS       GI/Hepatic: Comment: GERD symptoms resolved years ago.        (-) GERD   Renal/Genitourinary:     (+) Nephrolithiasis ,       Endo:     (+) Obesity (BMI>30), .      Psychiatric:  - neg psychiatric ROS       Infectious Disease:  - neg infectious disease ROS       Malignancy:      - no malignancy   Other:    (+) No chance of pregnancy C-spine cleared: Yes, no H/O Chronic Pain,no other significant disability            Temp: 97.8  F (36.6  C) Temp src: Oral BP: 124/85 Pulse: 88   Resp: 18 SpO2: 96 %         225 lbs  "12.8 oz  5' 8.2\"   Body mass index is 34.13 kg/m .       Physical Exam  Constitutional: Awake, alert, cooperative, no apparent distress, and appears stated age.  Eyes: Pupils equal, round and reactive to light, extra ocular muscles intact, sclera clear, conjunctiva normal.  HENT: Normocephalic, oral pharynx with moist mucus membranes, good dentition. No goiter appreciated.   Respiratory: Clear to auscultation bilaterally, no crackles or wheezing.  Cardiovascular: Regular rate and rhythm, normal S1 and S2, and no murmur noted.  Carotids +2, no bruits. No edema. Palpable pulses to radial  DP and PT arteries.   GI: Normal bowel sounds, soft, non-distended, non-tender, no masses palpated, no hepatosplenomegaly.    Lymph/Hematologic: No cervical lymphadenopathy and no supraclavicular lymphadenopathy.  Genitourinary:  deferred  Skin: Warm and dry.  No rashes at anticipated surgical site.   Musculoskeletal: Full ROM of neck. There is no redness, warmth, or swelling of the joints. Gross motor strength is normal.    Neurologic: Awake, alert, oriented to name, place and time. Cranial nerves II-XII are grossly intact. Gait appears steady.   Neuropsychiatric: Calm, cooperative. Normal affect.     Labs: (personally reviewed)  Lab Results   Component Value Date    WBC 6.3 06/24/2019     Lab Results   Component Value Date    RBC 5.09 06/24/2019     Lab Results   Component Value Date    HGB 15.8 06/24/2019     Lab Results   Component Value Date    HCT 48.9 06/24/2019     Lab Results   Component Value Date    MCV 96 06/24/2019     Lab Results   Component Value Date    MCH 31.0 06/24/2019     Lab Results   Component Value Date    MCHC 32.3 06/24/2019     Lab Results   Component Value Date    RDW 12.0 06/24/2019     Lab Results   Component Value Date     06/24/2019     Last Comprehensive Metabolic Panel:  Sodium   Date Value Ref Range Status   06/24/2019 140 133 - 144 mmol/L Final     Potassium   Date Value Ref Range Status " "  06/24/2019 4.1 3.4 - 5.3 mmol/L Final     Chloride   Date Value Ref Range Status   06/24/2019 109 94 - 109 mmol/L Final     Carbon Dioxide   Date Value Ref Range Status   06/24/2019 26 20 - 32 mmol/L Final     Anion Gap   Date Value Ref Range Status   06/24/2019 5 3 - 14 mmol/L Final     Glucose   Date Value Ref Range Status   06/24/2019 110 (H) 70 - 99 mg/dL Final     Urea Nitrogen   Date Value Ref Range Status   06/24/2019 10 7 - 30 mg/dL Final     Creatinine   Date Value Ref Range Status   06/24/2019 0.73 0.66 - 1.25 mg/dL Final     GFR Estimate   Date Value Ref Range Status   06/24/2019 >90 >60 mL/min/[1.73_m2] Final     Comment:     Non  GFR Calc  Starting 12/18/2018, serum creatinine based estimated GFR (eGFR) will be   calculated using the Chronic Kidney Disease Epidemiology Collaboration   (CKD-EPI) equation.       Calcium   Date Value Ref Range Status   06/24/2019 8.8 8.5 - 10.1 mg/dL Final       ASSESSMENT and PLAN  Dion Villavicencio is a 58 year old male scheduled to undergo Stealth Assisted Bilateral Deep Brain Stimulator Placement, Phase I, Placement Of Bilateral Deep Brain Stimulator Electrode, Target Bilateral Subthalamic Nucleus With Microelectrode Recording on 7/9/2019 with Dr. Cardozo at the [facility] under monitored anesthesia care and local.    He has the following specific operative considerations:   - METS:  >4. RCRI : No serious cardiac risks.  0.4 % risk of major adverse cardiac event.  No further cardiac evaluation needed per 2014 ACC/AHA guidelines for non-cardiac surgery.   - Treated  OCTAVIO :  Instructed to bring CPAP with DOS.  - VTE risk:  0.5%.  Von Willebrands disease.   - Risk of PONV score = 1-2.  If > 2, anti-emetic intervention recommended.      #  Cardiology - denies cardiac history or symptoms.     #  Pulmonary - denies smoking hx  #  Hematology    -   Von Willebrands's disease: Per Dr. Ma's note from 9/6/2018 regarding instructions for surgery:   \"Assessment:  In summary, " Dion Villavicencio is a 57-year-old man with refractory Parkinson's Disease and Type 1 vWD with mild bleeding history.  Despite this history, I do think that he is at high risk for bleeding with placement of a DBS device without factor replacement.  Explained that the duration the factor replacement for this, surgery is not known, but my estimation is that one week of therapy is likely sufficient.  In reviewing his von Willebrand panel he is very unlikely to achieve normal von Willebrand factor levels with use of estimate and certainly the duration of elevated levels would not be adequate with this therapy.  Therefore, I recommended use of factor replacement therapy and I think of the choices available Humate-P is a reasonable option.  I explained that her goal is to increase the levels for adequate hemostasis without creating a greatly elevated factor VIII which would put him at risk for thrombotic complications from the surgery.     Given this 2-stage procedure with a brief hospital stay, I recommended that he receive a dose of Humate-P based on his baseline factor VIII and von Willebrand factor.  This would occur about 1 hour prior to the surgery and then we would follow his von Willebrand factor activity in the hospital.  This would help us to understand the kinetics of factor given that this is his first exposure.  We would then Humate-P replacement to maintain a goal von Willebrand level of 100% over the course of the next week as an outpatient.  This will require our working with home infusion service to provide his factor and placement of an indwelling catheter while as an inpatient for him to maintain at home.     Regarding the second stage of the procedure which entails placement of a small device and of the skin to service battery source for the device, I would estimate only a couple days of normal von Willebrand factor levels would be necessary.  We could also supplement this plan with the use of Amicar if  "required.     Plan:  1. Majority of today's visit was spent counseling the patient regarding treatment of vWD around major surgical procedures.  I did explain that even with achieving our goal factor levels he is still at risk for bleeding as all patients to undergo this procedure are.  2. Humate-P 60 RCo Units/kg x 1 prior to surgery  3. Check vWF Panel (vWF antigen, activity and F8) 1 hour post surgery and at AM draw on post op day 1.  This will inform his kinetics and help with planning his further Humate doses to keep vWF activity ~100.  He will almost certainly require a second dose of Humate P prior to discharge on post op day 1.  4. We will help arrange for home infusion and for the schedule for factor replacement based on #3.  5. We will make a separate plan for placement of the battery pack device based on experience with his first procedure.     The patient is given our center's contact information and is instructed to call if he should have any further questions or concerns.  Otherwise, we will plan on seeing him back annually and as needed for surgical planning\".      #  Neuro    - Parkinson's disease with progressing tremors that have been refractory to medical therapy.  Above procedure planned.    Per Dr. Cardozo's note 1/7/19, \"Considering all the above, the following is tentatively planned.     1.  Patient will be admitted day before the surgery, likely Monday, in preparation for Humate-P 60 RCo Units/Kg prior to surgery.  2.  Plan for bilateral deep brain stimulator placement, phase I, placement of bilateral deep brain stimulator electrodes, target bilateral subthalamic nucleus, with microelectrode recording, on Tuesday.  The surgery will likely be about 8 hours long.  3.  Check vWF Panel (vWF antigen, activity and F8) 1 hour post surgery and at AM draw on post op day 1, Wednesday.  4.  He will need second dose of Humate P on post op day 1, Wednesday.  5.  Another Humate P dose on post op day 2, " Thursday and discharge patient to outpatient.   6.  Patient will go to  as an outpatient and undergo phase II generator/battery placement.  7.  Go home for home infusion.     Alternate plan is, if we cannot discharge him to outpatient is this.  1.  Patient will be admitted day before the surgery, likely Monday, in preparation for Humate-P 60 RCo Units/Kg prior to surgery.  2.  Plan for bilateral deep brain stimulator placement, phase I, placement of bilateral deep brain stimulator electrodes, target bilateral subthalamic nucleus, with microelectrode recording, on Tuesday.  The surgery will likely be about 8 hours long.  3.  Check vWF Panel (vWF antigen, activity and F8) 1 hour post surgery and at AM draw on post op day 1, Wednesday.  4.  He will need second dose of Humate P on post op day 1, Wednesday.  5.  Another Humate P dose on post op day 2, Thursday and take him to surgery for phase II surgery as an inpatient.   6.  Check vWF Panel (vWF antigen, activity and F8) 1 hour post surgery and at AM draw on post op day 1, Friday.  7.  He will need another dose of Humate P on post op day 1, Friday, before the final discharge.  8.  Go home for home infusion.     Backup plan, if we are not able to complete bilateral implantation at step 2 on Tuesday is the following.  1.  Patient will be admitted day before the surgery, likely Monday, in preparation for Humate-P 60 RCo Units/Kg prior to surgery.  2.  Plan for bilateral deep brain stimulator placement, phase I, placement of bilateral deep brain stimulator electrodes, target bilateral subthalamic nucleus, with microelectrode recording, on Tuesday.  The surgery will likely be about 8 hours long.  If we cannot complete bilateral implantation for whatever reason, we will just do one side, left side.  3.  Check vWF Panel (vWF antigen, activity and F8) 1 hour post surgery and at AM draw on post op day 1, Wednesday.  4.  He will need second dose of Humate P on post op day 1,  "Wednesday.  5.  Another Humate P dose on post op day 2, Thursday and take him to surgery for phase II surgery as an inpatient.   6.  Check vWF Panel (vWF antigen, activity and F8) 1 hour post surgery and at AM draw on post op day 1, Friday.  7.  He will need another dose of Humate P on post op day 1, Friday.  8.  Another Humate P dose on post op day 2, Saturday, 1 hour prior to his second side phase I surgery, right side surgery.  9.  Check vWF Panel (vWF antigen, activity and F8) 1 hour post surgery and at AM draw on post op day 1, Sunday.  10.  He will need second dose of Humate P on post op day 1, Sunday, before his final discharge.  11.  Go home for home infusion\".      - Anesthesia considerations:  Refer to PAC assessment in anesthesia records  - CBC, BMP and T&S today      Arrival time, NPO, shower and medication instructions provided by nursing staff today.  Preparing For Your Surgery handout given.  Patient was discussed with Dr Lr.      DIETER Sanford CNP  Preoperative Assessment Center  [facility]  [facility]  Phone: [phone]  Fax: [phone]  "

## 2019-06-24 NOTE — PATIENT INSTRUCTIONS
Patient is to be admitted to [facility] on July 8th, one day prior to procedure per Emmanuelle Joseph RN.

## 2019-07-03 ENCOUNTER — PATIENT OUTREACH (OUTPATIENT)
Dept: NEUROSURGERY | Facility: CLINIC | Age: 59
End: 2019-07-03

## 2019-07-03 ENCOUNTER — TELEPHONE (OUTPATIENT)
Dept: HEMATOLOGY | Facility: CLINIC | Age: 59
End: 2019-07-03

## 2019-07-03 ENCOUNTER — TELEPHONE (OUTPATIENT)
Dept: NEUROSURGERY | Facility: CLINIC | Age: 59
End: 2019-07-03

## 2019-07-03 NOTE — TELEPHONE ENCOUNTER
Dion called [facility] with a medication question. Dion was prescribed medrol Dosepak to help with his back pain, however he was worried about any interactions it may have with vonVendi which he will be using in his upcoming surgery. Per BETH Carrasquillo these medications should not interact with each other and he is welcome to use the medrol Dosepak. Dion was reassured and agreed to call with any other questions, comments or concerns.    Becky Sanchez RN - Nurse Clinician - [facility] - [phone]

## 2019-07-03 NOTE — PROGRESS NOTES
Pt says he hurt his back doing yard work and was prescribed a medrol dose pack by his PCP. Pt has not started the medication. He wanted to make sure there is no contraindication prior to his DBS surgery on 7/9. Will f/u with pt later today. He is in agreement with plan. No further questions at this time.

## 2019-07-03 NOTE — TELEPHONE ENCOUNTER
Spoke with pt in follow up to our earlier conversation about whether the medrol dose pack is a contraindication prior to pt's DBS surgeryon 7/9. Per Dr. Cardozo, it is not. However, he is not sure if there will be any interaction with the medication that hematology is planning to infuse on 7/8/19 prior to surgery. Pt said he will f/u with hematology before taking the medication.     I also conveyed Dr. Cardozo's concern about back pain and the difficulty this presents with a prolonged awake surgery. Pt understands he will be in one position for an extended period. He said he is not in pain when he is lying flat or or in a semi reclining position. The pain is more hip pain than back pain and really only bothers him when he is standing.     Pt understands that he may not take ibuprofen within 24 hours of surgery and may not take  naproxen within 3 days of surgery.     Pt will call  before Monday if back/hip pain increases to the point that he cannot lay down without discomfort. He has my direct number. No further questions at this time.       ----- Message from Sidney Cardozo MD sent at 7/3/2019  1:26 PM CDT -----  Regarding: RE:  medrol dose pack question  Not really from DBS standpoint but I don't know how it will interact with the new medication that hematology is planning to use.  Second, if he hurt his back, it would be difficult for him to sit still for the DBS surgery...  We have done this before and it's very difficult for me and for the patient.  He needs both sides done so this is even more of a problem.    ----- Message -----  From: Emmanuelle Joseph RN  Sent: 7/3/2019  11:08 AM  To: Sidney Cardozo MD  Subject:  medrol dose pack question                       Pt hurt his back doing yard work and his PCP prescribed a medrol dose pack. Is there any contraindication before his DBS surgery on 7/9? He hasn't taken any yet b/c he wanted to make sure it wouldn't be a problem.    Thanks,  Emmanuelle

## 2019-07-08 ENCOUNTER — HOSPITAL ENCOUNTER (INPATIENT)
Facility: CLINIC | Age: 59
LOS: 4 days | Discharge: HOME | DRG: 026 | End: 2019-07-12
Attending: NEUROLOGICAL SURGERY | Admitting: NEUROLOGICAL SURGERY
Payer: COMMERCIAL

## 2019-07-08 DIAGNOSIS — Z96.89 S/P DEEP BRAIN STIMULATOR PLACEMENT: ICD-10-CM

## 2019-07-08 DIAGNOSIS — D68.01 VON WILLEBRAND DISEASE, TYPE I (H): Primary | ICD-10-CM

## 2019-07-08 DIAGNOSIS — G20.A1 PARKINSON'S DISEASE (H): ICD-10-CM

## 2019-07-08 DIAGNOSIS — G20.A1 PARKINSON DISEASE (H): ICD-10-CM

## 2019-07-08 PROCEDURE — 25000132 ZZH RX MED GY IP 250 OP 250 PS 637: Performed by: STUDENT IN AN ORGANIZED HEALTH CARE EDUCATION/TRAINING PROGRAM

## 2019-07-08 PROCEDURE — 40000141 ZZH STATISTIC PERIPHERAL IV START W/O US GUIDANCE

## 2019-07-08 PROCEDURE — 30233W1 TRANSFUSION OF NONAUTOLOGOUS FACTOR IX INTO PERIPHERAL VEIN, PERCUTANEOUS APPROACH: ICD-10-PCS | Performed by: NEUROLOGICAL SURGERY

## 2019-07-08 PROCEDURE — 12000001 ZZH R&B MED SURG/OB UMMC

## 2019-07-08 PROCEDURE — 25000555 ZZHC RX FACTOR IP 250 OP 636: Performed by: NEUROLOGICAL SURGERY

## 2019-07-08 PROCEDURE — 25000132 ZZH RX MED GY IP 250 OP 250 PS 637: Performed by: NEUROLOGICAL SURGERY

## 2019-07-08 RX ORDER — CARBIDOPA AND LEVODOPA 25; 100 MG/1; MG/1
1 TABLET ORAL DAILY PRN
Status: DISCONTINUED | OUTPATIENT
Start: 2019-07-08 | End: 2019-07-12 | Stop reason: HOSPADM

## 2019-07-08 RX ORDER — AMANTADINE HYDROCHLORIDE 100 MG/1
100 CAPSULE, GELATIN COATED ORAL
Status: DISCONTINUED | OUTPATIENT
Start: 2019-07-08 | End: 2019-07-12 | Stop reason: HOSPADM

## 2019-07-08 RX ORDER — CARBIDOPA AND LEVODOPA 25; 100 MG/1; MG/1
1 TABLET ORAL 3 TIMES DAILY
Status: DISCONTINUED | OUTPATIENT
Start: 2019-07-08 | End: 2019-07-12 | Stop reason: HOSPADM

## 2019-07-08 RX ORDER — CLINDAMYCIN PHOSPHATE 900 MG/50ML
900 INJECTION, SOLUTION INTRAVENOUS
Status: CANCELLED | OUTPATIENT
Start: 2019-07-08

## 2019-07-08 RX ORDER — CEFAZOLIN SODIUM 1 G/3ML
1 INJECTION, POWDER, FOR SOLUTION INTRAMUSCULAR; INTRAVENOUS SEE ADMIN INSTRUCTIONS
Status: CANCELLED | OUTPATIENT
Start: 2019-07-08

## 2019-07-08 RX ORDER — CEFAZOLIN SODIUM 2 G/100ML
2 INJECTION, SOLUTION INTRAVENOUS
Status: CANCELLED | OUTPATIENT
Start: 2019-07-08

## 2019-07-08 RX ORDER — ROPINIROLE 2 MG/1
2 TABLET, FILM COATED ORAL 3 TIMES DAILY
Status: DISCONTINUED | OUTPATIENT
Start: 2019-07-08 | End: 2019-07-12 | Stop reason: HOSPADM

## 2019-07-08 RX ORDER — CLINDAMYCIN PHOSPHATE 900 MG/50ML
900 INJECTION, SOLUTION INTRAVENOUS SEE ADMIN INSTRUCTIONS
Status: CANCELLED | OUTPATIENT
Start: 2019-07-08

## 2019-07-08 RX ORDER — IBUPROFEN 200 MG
200 TABLET ORAL EVERY 4 HOURS PRN
COMMUNITY
End: 2022-02-22

## 2019-07-08 RX ADMIN — LEVODOPA AND CARBIDOPA 2 CAPSULE: 145; 36.25 CAPSULE, EXTENDED RELEASE ORAL at 23:13

## 2019-07-08 RX ADMIN — AMANTADINE HYDROCHLORIDE 100 MG: 100 CAPSULE ORAL at 12:17

## 2019-07-08 RX ADMIN — CARBIDOPA AND LEVODOPA 1 TABLET: 25; 100 TABLET ORAL at 23:13

## 2019-07-08 RX ADMIN — ROPINIROLE 2 MG: 2 TABLET, FILM COATED ORAL at 23:13

## 2019-07-08 RX ADMIN — VON WILLEBRAND FACTOR (RECOMBINANT) 6000 UNITS: KIT at 21:43

## 2019-07-08 RX ADMIN — LEVODOPA AND CARBIDOPA 4 CAPSULE: 145; 36.25 CAPSULE, EXTENDED RELEASE ORAL at 15:36

## 2019-07-08 RX ADMIN — LEVODOPA AND CARBIDOPA 2 CAPSULE: 145; 36.25 CAPSULE, EXTENDED RELEASE ORAL at 19:13

## 2019-07-08 RX ADMIN — LEVODOPA AND CARBIDOPA 4 CAPSULE: 145; 36.25 CAPSULE, EXTENDED RELEASE ORAL at 12:18

## 2019-07-08 RX ADMIN — ROPINIROLE 2 MG: 2 TABLET, FILM COATED ORAL at 15:36

## 2019-07-08 RX ADMIN — CARBIDOPA AND LEVODOPA 1 TABLET: 25; 100 TABLET ORAL at 19:15

## 2019-07-08 ASSESSMENT — ACTIVITIES OF DAILY LIVING (ADL)
TOILETING: 0-->INDEPENDENT
ADLS_ACUITY_SCORE: 13
BATHING: 0-->INDEPENDENT
RETIRED_EATING: 0-->INDEPENDENT
FALL_HISTORY_WITHIN_LAST_SIX_MONTHS: NO
AMBULATION: 0-->INDEPENDENT
SWALLOWING: 0-->SWALLOWS FOODS/LIQUIDS WITHOUT DIFFICULTY
DRESS: 0-->INDEPENDENT
ADLS_ACUITY_SCORE: 13
COGNITION: 0 - NO COGNITION ISSUES REPORTED
RETIRED_COMMUNICATION: 0-->UNDERSTANDS/COMMUNICATES WITHOUT DIFFICULTY
TRANSFERRING: 0-->INDEPENDENT
ADLS_ACUITY_SCORE: 11

## 2019-07-08 NOTE — PLAN OF CARE
Status: Admitted for factor 8 IV tonight and DBS placement tomorrow.  Vitals: stable  Neuros: intact except R pupil was larger than L And states R hip in tingly when he walks, but denies pain.No other N/T and motor intact.  IV: SL'd  Diet: Regular  : Voiding spont  Activity: Up ad johnson.  Social: Wife at bedside.  Plan: OR 6am tomorrow.

## 2019-07-08 NOTE — H&P
[facility]       NEUROSURGERY H&P NOTE    HPI:  Mr. Villavicencio is a 59 yo M with PMH Parkinson's disease, von Willebrand's disease presenting for von Willebrand's management 1 day prior to scheduled surgery on 7/9/19 for bilateral DBS phase 1 targeting bilateral subthalamic nucleus for Parkinson's disease. Patient has had tremors in right hand since 2006, with bilateral lower extremity tremors developing since then. Symptoms refractory to carbidopa-levodopa, ropinirole, and amantadine. Heme team aware of patient and will help manage von Willebrand's infusion as needed. States no significant changes since clinic visit with Dr. Cardozo on 1/7/19.      PAST MEDICAL HISTORY:   Past Medical History:   Diagnosis Date     Encounter for neuropsychological testing 9/16/2018    IMPRESSIONS AND RECOMMENDATIONS   Current results indicate impairments in learning, which are somewhat variable, and memory that falls well within normal limits. Language, visual processing, complex attentional processing, and executive functioning fall within normal limits, generally in the above average range. He denied experiencing significant depressive symptomatology, apathy, or anxiety.   Th     Esophageal reflux 7/29/2018     H/O magnetic resonance imaging of brain and brain stem 8/31/2018    MR BRAIN W/O & W CONTRAST 8/31/2018 2:30 PM  History: ; Parkinson's disease (H) ICD-10: Parkinson's disease (H)  Comparison:  3/14/2006    Technique: Midline sagittal T1-weighted images and axial diffusion with images through the whole brain were obtained. Axial and coronal T2-weighted images, and inversion recovery images were obtained with focus on the evans through the corpus callosum, obtained      Hx of von Willebrand's disease 7/29/2018     Parkinson disease (H) 7/29/2018       PAST SURGICAL HISTORY:   Past Surgical History:   Procedure Laterality Date     C ORAL SURGERY PROCEDURE  1986    wisdom teeth surgery        FAMILY HISTORY:   Family History   Problem Relation Age of Onset     Eye Disorder Mother         dry eyes ? glaucoma     Diabetes Father      Blood Disease Father         von willendbrand     Skin Cancer Father      Nephrolithiasis Brother      Nephrolithiasis Brother      Blood Disease Son         chung baum       SOCIAL HISTORY:   Social History     Tobacco Use     Smoking status: Never Smoker     Smokeless tobacco: Never Used   Substance Use Topics     Alcohol use: No       MEDICATIONS:  No current outpatient medications on file.       Allergies:  Allergies   Allergen Reactions     Penicillins      Hives     Pramipexole      Leg swelling and compulsive behavior       ROS: 10 point ROS of systems including Constitutional, Eyes, Respiratory, Cardiovascular, Gastroenterology, Genitourinary, Integumentary, Muscularskeletal, Psychiatric were all negative except for pertinent positives noted in my HPI.    Physical exam:   Blood pressure (!) 139/92, pulse 90, temperature 95.6  F (35.3  C), temperature source Oral, resp. rate 16, SpO2 94 %.  CV: Regular rate  PULM: breathing comfortably on room air  NEUROLOGIC:  -- Awake; Alert; oriented x 3  -- Follows commands briskly  -- Speech fluent, spontaneous. No aphasia or dysarthria.  -- no gaze preference. No apparent hemineglect.  Cranial Nerves:  -- visual fields full to confrontation, PERRL 3-2mm bilat and brisk, extraocular movements intact  -- face symmetrical, tongue midline  -- sensory V1-V3 intact bilaterally  -- palate elevates symmetrically, uvula midline  -- hearing grossly intact bilat  -- Trapezii 5/5 strength bilat symmetric  -- Cerebellar: Finger nose finger without dysmetria    Motor:  Normal bulk / tone; resting tremor of right hand, mild bilateral lower extremity tremor.  No muscle wasting or fasciculations  No Pronator Drift     Delt Bi Tri Hand Flexion/  Extension Iliopsoas Quadriceps Hamstrings Tibialis Anterior Gastroc    C5 C6 C7 C8/T1 L2 L3  L4-S1 L4 S1   R 5 5 5 5 5 5 5 5 5   L 5 5 5 5 5 5 5 5 5   Sensory:  intact to LT x 4 extremities     Reflexes:     Bi Tri BR Josemanuel Pat Ach Bab    C5-6 C7-8 C6 UMN L2-4 S1 UMN   R 2+ 2+ 2+ Norm 2+ 2+ Norm   L 2+ 2+ 2+ Norm 2+ 2+ Norm     Gait: deferred      IMAGING:  No new imaging    ASSESSMENT:  Mr. Villavicencio is a 59 yo M with PMH Parkinson's disease, von Willebrand's disease presenting for von Willebrand's management 1 day prior to scheduled surgery on 7/9/19 for bilateral DBS phase 1 targeting bilateral subthalamic nucleus for Parkinson's disease.    RECOMMENDATIONS:  - Plan for OR 7/9/19 for bilateral DBS phase 1 as above  - von Willebrand infusion and labs per heme  - continue pta Parkinson's medications  - NPO at midnight  - pre-op labs tomorrow    The patient was discussed with Dr. Cardozo, neurosurgery attending, and he agrees with the above.    Fernando Dubose MD  Neurosurgery Resident PGY2

## 2019-07-09 ENCOUNTER — APPOINTMENT (OUTPATIENT)
Dept: GENERAL RADIOLOGY | Facility: CLINIC | Age: 59
DRG: 026 | End: 2019-07-09
Attending: NEUROLOGICAL SURGERY
Payer: COMMERCIAL

## 2019-07-09 ENCOUNTER — ANESTHESIA (OUTPATIENT)
Dept: SURGERY | Facility: CLINIC | Age: 59
DRG: 026 | End: 2019-07-09
Payer: COMMERCIAL

## 2019-07-09 ENCOUNTER — APPOINTMENT (OUTPATIENT)
Dept: CT IMAGING | Facility: CLINIC | Age: 59
DRG: 026 | End: 2019-07-09
Attending: NEUROLOGICAL SURGERY
Payer: COMMERCIAL

## 2019-07-09 ENCOUNTER — HOSPITAL ENCOUNTER (OUTPATIENT)
Dept: CT IMAGING | Facility: CLINIC | Age: 59
DRG: 026 | End: 2019-07-09
Attending: NEUROLOGICAL SURGERY | Admitting: NEUROLOGICAL SURGERY
Payer: COMMERCIAL

## 2019-07-09 DIAGNOSIS — G20.A1 PARKINSON'S DISEASE (H): ICD-10-CM

## 2019-07-09 PROBLEM — Z09 POSTOP CHECK: Status: ACTIVE | Noted: 2019-07-09

## 2019-07-09 LAB
ABO + RH BLD: NORMAL
ABO + RH BLD: NORMAL
ANION GAP SERPL CALCULATED.3IONS-SCNC: 7 MMOL/L (ref 3–14)
BASOPHILS # BLD AUTO: 0.1 10E9/L (ref 0–0.2)
BASOPHILS NFR BLD AUTO: 0.6 %
BLD GP AB SCN SERPL QL: NORMAL
BLOOD BANK CMNT PATIENT-IMP: NORMAL
BUN SERPL-MCNC: 21 MG/DL (ref 7–30)
CALCIUM SERPL-MCNC: 8.6 MG/DL (ref 8.5–10.1)
CHLORIDE SERPL-SCNC: 107 MMOL/L (ref 94–109)
CO2 SERPL-SCNC: 25 MMOL/L (ref 20–32)
CREAT SERPL-MCNC: 0.88 MG/DL (ref 0.66–1.25)
DIFFERENTIAL METHOD BLD: ABNORMAL
EOSINOPHIL # BLD AUTO: 0.2 10E9/L (ref 0–0.7)
EOSINOPHIL NFR BLD AUTO: 1.9 %
ERYTHROCYTE [DISTWIDTH] IN BLOOD BY AUTOMATED COUNT: 12.4 % (ref 10–15)
FACT VIII ACT/NOR PPP: 126 % (ref 55–200)
FACT VIII ACT/NOR PPP: 87 % (ref 55–200)
GFR SERPL CREATININE-BSD FRML MDRD: >90 ML/MIN/{1.73_M2}
GLUCOSE SERPL-MCNC: 114 MG/DL (ref 70–99)
HCT VFR BLD AUTO: 56.7 % (ref 40–53)
HGB BLD-MCNC: 17.9 G/DL (ref 13.3–17.7)
IMM GRANULOCYTES # BLD: 0.1 10E9/L (ref 0–0.4)
IMM GRANULOCYTES NFR BLD: 0.7 %
LYMPHOCYTES # BLD AUTO: 1.8 10E9/L (ref 0.8–5.3)
LYMPHOCYTES NFR BLD AUTO: 21.7 %
MCH RBC QN AUTO: 30.4 PG (ref 26.5–33)
MCHC RBC AUTO-ENTMCNC: 31.6 G/DL (ref 31.5–36.5)
MCV RBC AUTO: 96 FL (ref 78–100)
MONOCYTES # BLD AUTO: 0.7 10E9/L (ref 0–1.3)
MONOCYTES NFR BLD AUTO: 8 %
NEUTROPHILS # BLD AUTO: 5.7 10E9/L (ref 1.6–8.3)
NEUTROPHILS NFR BLD AUTO: 67.1 %
NRBC # BLD AUTO: 0 10*3/UL
NRBC BLD AUTO-RTO: 0 /100
PLATELET # BLD AUTO: 283 10E9/L (ref 150–450)
POTASSIUM SERPL-SCNC: 3.8 MMOL/L (ref 3.4–5.3)
RBC # BLD AUTO: 5.88 10E12/L (ref 4.4–5.9)
SODIUM SERPL-SCNC: 138 MMOL/L (ref 133–144)
SPECIMEN EXP DATE BLD: NORMAL
VWF CBA/VWF AG PPP IA-RTO: 109 % (ref 50–200)
VWF CBA/VWF AG PPP IA-RTO: 98 % (ref 50–200)
VWF:AC ACT/NOR PPP IA: 57 % (ref 50–180)
VWF:AC ACT/NOR PPP IA: 80 % (ref 50–180)
WBC # BLD AUTO: 8.5 10E9/L (ref 4–11)

## 2019-07-09 PROCEDURE — 40000170 ZZH STATISTIC PRE-PROCEDURE ASSESSMENT II: Performed by: NEUROLOGICAL SURGERY

## 2019-07-09 PROCEDURE — 25000132 ZZH RX MED GY IP 250 OP 250 PS 637: Performed by: STUDENT IN AN ORGANIZED HEALTH CARE EDUCATION/TRAINING PROGRAM

## 2019-07-09 PROCEDURE — 27210794 ZZH OR GENERAL SUPPLY STERILE: Performed by: NEUROLOGICAL SURGERY

## 2019-07-09 PROCEDURE — 40000986 XR SKULL PORT 1/3 VW

## 2019-07-09 PROCEDURE — 27210995 ZZH RX 272: Performed by: NEUROLOGICAL SURGERY

## 2019-07-09 PROCEDURE — 00000328 ZZHCL STATISTIC PTT NC: Performed by: NEUROLOGICAL SURGERY

## 2019-07-09 PROCEDURE — 40000277 XR SURGERY CARM FLUORO LESS THAN 5 MIN W STILLS: Mod: TC

## 2019-07-09 PROCEDURE — 85246 CLOT FACTOR VIII VW ANTIGEN: CPT | Performed by: NEUROLOGICAL SURGERY

## 2019-07-09 PROCEDURE — 36415 COLL VENOUS BLD VENIPUNCTURE: CPT | Performed by: STUDENT IN AN ORGANIZED HEALTH CARE EDUCATION/TRAINING PROGRAM

## 2019-07-09 PROCEDURE — 25000128 H RX IP 250 OP 636: Performed by: NURSE ANESTHETIST, CERTIFIED REGISTERED

## 2019-07-09 PROCEDURE — 85245 CLOT FACTOR VIII VW RISTOCTN: CPT | Performed by: PHYSICIAN ASSISTANT

## 2019-07-09 PROCEDURE — 85025 COMPLETE CBC W/AUTO DIFF WBC: CPT | Performed by: NEUROLOGICAL SURGERY

## 2019-07-09 PROCEDURE — 25000125 ZZHC RX 250: Performed by: NURSE ANESTHETIST, CERTIFIED REGISTERED

## 2019-07-09 PROCEDURE — 37000009 ZZH ANESTHESIA TECHNICAL FEE, EACH ADDTL 15 MIN: Performed by: NEUROLOGICAL SURGERY

## 2019-07-09 PROCEDURE — 36000074 ZZH SURGERY LEVEL 6 1ST 30 MIN - UMMC: Performed by: NEUROLOGICAL SURGERY

## 2019-07-09 PROCEDURE — 00000401 ZZHCL STATISTIC THROMBIN TIME NC: Performed by: NEUROLOGICAL SURGERY

## 2019-07-09 PROCEDURE — 8E09XBG COMPUTER ASSISTED PROCEDURE OF HEAD AND NECK REGION, WITH COMPUTERIZED TOMOGRAPHY: ICD-10-PCS | Performed by: NEUROLOGICAL SURGERY

## 2019-07-09 PROCEDURE — 37000008 ZZH ANESTHESIA TECHNICAL FEE, 1ST 30 MIN: Performed by: NEUROLOGICAL SURGERY

## 2019-07-09 PROCEDURE — 70450 CT HEAD/BRAIN W/O DYE: CPT

## 2019-07-09 PROCEDURE — 20000004 ZZH R&B ICU UMMC

## 2019-07-09 PROCEDURE — 00H03MZ INSERTION OF NEUROSTIMULATOR LEAD INTO BRAIN, PERCUTANEOUS APPROACH: ICD-10-PCS | Performed by: NEUROLOGICAL SURGERY

## 2019-07-09 PROCEDURE — 25000128 H RX IP 250 OP 636: Performed by: ANESTHESIOLOGY

## 2019-07-09 PROCEDURE — C1778 LEAD, NEUROSTIMULATOR: HCPCS | Performed by: NEUROLOGICAL SURGERY

## 2019-07-09 PROCEDURE — 25800030 ZZH RX IP 258 OP 636: Performed by: NURSE ANESTHETIST, CERTIFIED REGISTERED

## 2019-07-09 PROCEDURE — 25000125 ZZHC RX 250: Performed by: NEUROLOGICAL SURGERY

## 2019-07-09 PROCEDURE — 27810169 ZZH OR IMPLANT GENERAL: Performed by: NEUROLOGICAL SURGERY

## 2019-07-09 PROCEDURE — 25000128 H RX IP 250 OP 636: Performed by: NEUROLOGICAL SURGERY

## 2019-07-09 PROCEDURE — 85246 CLOT FACTOR VIII VW ANTIGEN: CPT | Performed by: PHYSICIAN ASSISTANT

## 2019-07-09 PROCEDURE — 86850 RBC ANTIBODY SCREEN: CPT | Performed by: STUDENT IN AN ORGANIZED HEALTH CARE EDUCATION/TRAINING PROGRAM

## 2019-07-09 PROCEDURE — 85240 CLOT FACTOR VIII AHG 1 STAGE: CPT | Performed by: PHYSICIAN ASSISTANT

## 2019-07-09 PROCEDURE — 85245 CLOT FACTOR VIII VW RISTOCTN: CPT | Performed by: STUDENT IN AN ORGANIZED HEALTH CARE EDUCATION/TRAINING PROGRAM

## 2019-07-09 PROCEDURE — 36415 COLL VENOUS BLD VENIPUNCTURE: CPT | Performed by: PHYSICIAN ASSISTANT

## 2019-07-09 PROCEDURE — 00000167 ZZHCL STATISTIC INR NC: Performed by: NEUROLOGICAL SURGERY

## 2019-07-09 PROCEDURE — 86900 BLOOD TYPING SEROLOGIC ABO: CPT | Performed by: STUDENT IN AN ORGANIZED HEALTH CARE EDUCATION/TRAINING PROGRAM

## 2019-07-09 PROCEDURE — 36000076 ZZH SURGERY LEVEL 6 EA 15 ADDTL MIN - UMMC: Performed by: NEUROLOGICAL SURGERY

## 2019-07-09 PROCEDURE — 71000014 ZZH RECOVERY PHASE 1 LEVEL 2 FIRST HR: Performed by: NEUROLOGICAL SURGERY

## 2019-07-09 PROCEDURE — 80048 BASIC METABOLIC PNL TOTAL CA: CPT | Performed by: NEUROLOGICAL SURGERY

## 2019-07-09 PROCEDURE — 86901 BLOOD TYPING SEROLOGIC RH(D): CPT | Performed by: STUDENT IN AN ORGANIZED HEALTH CARE EDUCATION/TRAINING PROGRAM

## 2019-07-09 PROCEDURE — 25000125 ZZHC RX 250: Performed by: STUDENT IN AN ORGANIZED HEALTH CARE EDUCATION/TRAINING PROGRAM

## 2019-07-09 PROCEDURE — 25000555 ZZHC RX FACTOR IP 250 OP 636: Performed by: PHYSICIAN ASSISTANT

## 2019-07-09 PROCEDURE — 85240 CLOT FACTOR VIII AHG 1 STAGE: CPT | Performed by: NEUROLOGICAL SURGERY

## 2019-07-09 PROCEDURE — 25800030 ZZH RX IP 258 OP 636: Performed by: STUDENT IN AN ORGANIZED HEALTH CARE EDUCATION/TRAINING PROGRAM

## 2019-07-09 PROCEDURE — 70450 CT HEAD/BRAIN W/O DYE: CPT | Mod: 77

## 2019-07-09 DEVICE — IMP BUR HOLE COVER GUARDIAN 6010ANS: Type: IMPLANTABLE DEVICE | Site: BRAIN | Status: FUNCTIONAL

## 2019-07-09 DEVICE — KIT DBS LEAD DBS 8CH 40CM 1-3,3-1 SPACE 0.5 BLACK 6172ANS: Type: IMPLANTABLE DEVICE | Site: BRAIN | Status: FUNCTIONAL

## 2019-07-09 RX ORDER — POTASSIUM CHLORIDE 7.45 MG/ML
10 INJECTION INTRAVENOUS
Status: DISCONTINUED | OUTPATIENT
Start: 2019-07-09 | End: 2019-07-12 | Stop reason: HOSPADM

## 2019-07-09 RX ORDER — PROPOFOL 10 MG/ML
INJECTION, EMULSION INTRAVENOUS CONTINUOUS PRN
Status: DISCONTINUED | OUTPATIENT
Start: 2019-07-09 | End: 2019-07-09

## 2019-07-09 RX ORDER — POTASSIUM CL/LIDO/0.9 % NACL 10MEQ/0.1L
10 INTRAVENOUS SOLUTION, PIGGYBACK (ML) INTRAVENOUS
Status: DISCONTINUED | OUTPATIENT
Start: 2019-07-09 | End: 2019-07-12 | Stop reason: HOSPADM

## 2019-07-09 RX ORDER — ONDANSETRON 4 MG/1
4 TABLET, ORALLY DISINTEGRATING ORAL EVERY 30 MIN PRN
Status: DISCONTINUED | OUTPATIENT
Start: 2019-07-09 | End: 2019-07-09 | Stop reason: HOSPADM

## 2019-07-09 RX ORDER — DOCUSATE SODIUM 100 MG/1
100 CAPSULE, LIQUID FILLED ORAL 2 TIMES DAILY
Status: DISCONTINUED | OUTPATIENT
Start: 2019-07-09 | End: 2019-07-12 | Stop reason: HOSPADM

## 2019-07-09 RX ORDER — AMOXICILLIN 250 MG
1 CAPSULE ORAL 2 TIMES DAILY
Status: DISCONTINUED | OUTPATIENT
Start: 2019-07-09 | End: 2019-07-12 | Stop reason: HOSPADM

## 2019-07-09 RX ORDER — SODIUM CHLORIDE, SODIUM LACTATE, POTASSIUM CHLORIDE, CALCIUM CHLORIDE 600; 310; 30; 20 MG/100ML; MG/100ML; MG/100ML; MG/100ML
INJECTION, SOLUTION INTRAVENOUS CONTINUOUS
Status: DISCONTINUED | OUTPATIENT
Start: 2019-07-09 | End: 2019-07-09 | Stop reason: HOSPADM

## 2019-07-09 RX ORDER — CLINDAMYCIN PHOSPHATE 900 MG/50ML
900 INJECTION, SOLUTION INTRAVENOUS EVERY 8 HOURS
Status: COMPLETED | OUTPATIENT
Start: 2019-07-09 | End: 2019-07-10

## 2019-07-09 RX ORDER — HYDRALAZINE HYDROCHLORIDE 20 MG/ML
INJECTION INTRAMUSCULAR; INTRAVENOUS PRN
Status: DISCONTINUED | OUTPATIENT
Start: 2019-07-09 | End: 2019-07-09

## 2019-07-09 RX ORDER — MAGNESIUM SULFATE HEPTAHYDRATE 40 MG/ML
4 INJECTION, SOLUTION INTRAVENOUS EVERY 4 HOURS PRN
Status: DISCONTINUED | OUTPATIENT
Start: 2019-07-09 | End: 2019-07-12 | Stop reason: HOSPADM

## 2019-07-09 RX ORDER — MEPERIDINE HYDROCHLORIDE 25 MG/ML
12.5 INJECTION INTRAMUSCULAR; INTRAVENOUS; SUBCUTANEOUS
Status: DISCONTINUED | OUTPATIENT
Start: 2019-07-09 | End: 2019-07-09 | Stop reason: HOSPADM

## 2019-07-09 RX ORDER — LIDOCAINE HYDROCHLORIDE 20 MG/ML
JELLY TOPICAL PRN
Status: DISCONTINUED | OUTPATIENT
Start: 2019-07-09 | End: 2019-07-09 | Stop reason: HOSPADM

## 2019-07-09 RX ORDER — CLINDAMYCIN PHOSPHATE 900 MG/50ML
INJECTION, SOLUTION INTRAVENOUS PRN
Status: DISCONTINUED | OUTPATIENT
Start: 2019-07-09 | End: 2019-07-09

## 2019-07-09 RX ORDER — HYDRALAZINE HYDROCHLORIDE 20 MG/ML
10-20 INJECTION INTRAMUSCULAR; INTRAVENOUS EVERY 30 MIN PRN
Status: DISCONTINUED | OUTPATIENT
Start: 2019-07-09 | End: 2019-07-12 | Stop reason: HOSPADM

## 2019-07-09 RX ORDER — SODIUM CHLORIDE, SODIUM LACTATE, POTASSIUM CHLORIDE, CALCIUM CHLORIDE 600; 310; 30; 20 MG/100ML; MG/100ML; MG/100ML; MG/100ML
INJECTION, SOLUTION INTRAVENOUS CONTINUOUS PRN
Status: DISCONTINUED | OUTPATIENT
Start: 2019-07-09 | End: 2019-07-09

## 2019-07-09 RX ORDER — ONDANSETRON 2 MG/ML
4 INJECTION INTRAMUSCULAR; INTRAVENOUS EVERY 6 HOURS PRN
Status: DISCONTINUED | OUTPATIENT
Start: 2019-07-09 | End: 2019-07-12 | Stop reason: HOSPADM

## 2019-07-09 RX ORDER — PROCHLORPERAZINE MALEATE 10 MG
10 TABLET ORAL EVERY 6 HOURS PRN
Status: DISCONTINUED | OUTPATIENT
Start: 2019-07-09 | End: 2019-07-12 | Stop reason: HOSPADM

## 2019-07-09 RX ORDER — OXYCODONE HYDROCHLORIDE 5 MG/1
5-10 TABLET ORAL
Status: DISCONTINUED | OUTPATIENT
Start: 2019-07-09 | End: 2019-07-12 | Stop reason: HOSPADM

## 2019-07-09 RX ORDER — LABETALOL 20 MG/4 ML (5 MG/ML) INTRAVENOUS SYRINGE
10-40 EVERY 10 MIN PRN
Status: DISCONTINUED | OUTPATIENT
Start: 2019-07-09 | End: 2019-07-12 | Stop reason: HOSPADM

## 2019-07-09 RX ORDER — HYDROMORPHONE HYDROCHLORIDE 1 MG/ML
.3-.5 INJECTION, SOLUTION INTRAMUSCULAR; INTRAVENOUS; SUBCUTANEOUS
Status: DISCONTINUED | OUTPATIENT
Start: 2019-07-09 | End: 2019-07-12 | Stop reason: HOSPADM

## 2019-07-09 RX ORDER — FENTANYL CITRATE 50 UG/ML
25-50 INJECTION, SOLUTION INTRAMUSCULAR; INTRAVENOUS EVERY 5 MIN PRN
Status: DISCONTINUED | OUTPATIENT
Start: 2019-07-09 | End: 2019-07-09 | Stop reason: HOSPADM

## 2019-07-09 RX ORDER — NITROGLYCERIN 10 MG/100ML
INJECTION INTRAVENOUS PRN
Status: DISCONTINUED | OUTPATIENT
Start: 2019-07-09 | End: 2019-07-09

## 2019-07-09 RX ORDER — ONDANSETRON 2 MG/ML
4 INJECTION INTRAMUSCULAR; INTRAVENOUS EVERY 30 MIN PRN
Status: DISCONTINUED | OUTPATIENT
Start: 2019-07-09 | End: 2019-07-09 | Stop reason: HOSPADM

## 2019-07-09 RX ORDER — POTASSIUM CHLORIDE 1.5 G/1.58G
20-40 POWDER, FOR SOLUTION ORAL
Status: DISCONTINUED | OUTPATIENT
Start: 2019-07-09 | End: 2019-07-12 | Stop reason: HOSPADM

## 2019-07-09 RX ORDER — SODIUM CHLORIDE 9 MG/ML
INJECTION, SOLUTION INTRAVENOUS CONTINUOUS
Status: ACTIVE | OUTPATIENT
Start: 2019-07-09 | End: 2019-07-10

## 2019-07-09 RX ORDER — FENTANYL CITRATE 50 UG/ML
INJECTION, SOLUTION INTRAMUSCULAR; INTRAVENOUS PRN
Status: DISCONTINUED | OUTPATIENT
Start: 2019-07-09 | End: 2019-07-09

## 2019-07-09 RX ORDER — ACETAMINOPHEN 325 MG/1
975 TABLET ORAL EVERY 8 HOURS
Status: DISCONTINUED | OUTPATIENT
Start: 2019-07-09 | End: 2019-07-12 | Stop reason: HOSPADM

## 2019-07-09 RX ORDER — ONDANSETRON 4 MG/1
4 TABLET, ORALLY DISINTEGRATING ORAL EVERY 6 HOURS PRN
Status: DISCONTINUED | OUTPATIENT
Start: 2019-07-09 | End: 2019-07-12 | Stop reason: HOSPADM

## 2019-07-09 RX ORDER — PROPOFOL 10 MG/ML
INJECTION, EMULSION INTRAVENOUS PRN
Status: DISCONTINUED | OUTPATIENT
Start: 2019-07-09 | End: 2019-07-09

## 2019-07-09 RX ORDER — ACETAMINOPHEN 650 MG/1
650 SUPPOSITORY RECTAL EVERY 4 HOURS PRN
Status: DISCONTINUED | OUTPATIENT
Start: 2019-07-09 | End: 2019-07-12 | Stop reason: HOSPADM

## 2019-07-09 RX ORDER — AMOXICILLIN 250 MG
2 CAPSULE ORAL 2 TIMES DAILY
Status: DISCONTINUED | OUTPATIENT
Start: 2019-07-09 | End: 2019-07-12 | Stop reason: HOSPADM

## 2019-07-09 RX ORDER — NALOXONE HYDROCHLORIDE 0.4 MG/ML
.1-.4 INJECTION, SOLUTION INTRAMUSCULAR; INTRAVENOUS; SUBCUTANEOUS
Status: DISCONTINUED | OUTPATIENT
Start: 2019-07-09 | End: 2019-07-12 | Stop reason: HOSPADM

## 2019-07-09 RX ORDER — DEXMEDETOMIDINE HYDROCHLORIDE 4 UG/ML
0.2-1.2 INJECTION, SOLUTION INTRAVENOUS CONTINUOUS
Status: DISCONTINUED | OUTPATIENT
Start: 2019-07-09 | End: 2019-07-09 | Stop reason: HOSPADM

## 2019-07-09 RX ORDER — ONDANSETRON 2 MG/ML
INJECTION INTRAMUSCULAR; INTRAVENOUS PRN
Status: DISCONTINUED | OUTPATIENT
Start: 2019-07-09 | End: 2019-07-09

## 2019-07-09 RX ORDER — POTASSIUM CHLORIDE 29.8 MG/ML
20 INJECTION INTRAVENOUS
Status: DISCONTINUED | OUTPATIENT
Start: 2019-07-09 | End: 2019-07-12 | Stop reason: HOSPADM

## 2019-07-09 RX ORDER — POTASSIUM CHLORIDE 750 MG/1
20-40 TABLET, EXTENDED RELEASE ORAL
Status: DISCONTINUED | OUTPATIENT
Start: 2019-07-09 | End: 2019-07-12 | Stop reason: HOSPADM

## 2019-07-09 RX ORDER — HYDROMORPHONE HYDROCHLORIDE 1 MG/ML
.3-.5 INJECTION, SOLUTION INTRAMUSCULAR; INTRAVENOUS; SUBCUTANEOUS EVERY 10 MIN PRN
Status: DISCONTINUED | OUTPATIENT
Start: 2019-07-09 | End: 2019-07-09 | Stop reason: HOSPADM

## 2019-07-09 RX ORDER — NALOXONE HYDROCHLORIDE 0.4 MG/ML
.1-.4 INJECTION, SOLUTION INTRAMUSCULAR; INTRAVENOUS; SUBCUTANEOUS
Status: DISCONTINUED | OUTPATIENT
Start: 2019-07-09 | End: 2019-07-09 | Stop reason: HOSPADM

## 2019-07-09 RX ORDER — LABETALOL HYDROCHLORIDE 5 MG/ML
INJECTION, SOLUTION INTRAVENOUS PRN
Status: DISCONTINUED | OUTPATIENT
Start: 2019-07-09 | End: 2019-07-09

## 2019-07-09 RX ORDER — LIDOCAINE HYDROCHLORIDE AND EPINEPHRINE 10; 10 MG/ML; UG/ML
INJECTION, SOLUTION INFILTRATION; PERINEURAL PRN
Status: DISCONTINUED | OUTPATIENT
Start: 2019-07-09 | End: 2019-07-09 | Stop reason: HOSPADM

## 2019-07-09 RX ORDER — ACETAMINOPHEN 325 MG/1
650 TABLET ORAL EVERY 4 HOURS PRN
Status: DISCONTINUED | OUTPATIENT
Start: 2019-07-09 | End: 2019-07-09

## 2019-07-09 RX ORDER — ACETAMINOPHEN 325 MG/1
650 TABLET ORAL EVERY 4 HOURS PRN
Status: DISCONTINUED | OUTPATIENT
Start: 2019-07-12 | End: 2019-07-12 | Stop reason: HOSPADM

## 2019-07-09 RX ORDER — LIDOCAINE HYDROCHLORIDE 20 MG/ML
INJECTION, SOLUTION INFILTRATION; PERINEURAL PRN
Status: DISCONTINUED | OUTPATIENT
Start: 2019-07-09 | End: 2019-07-09

## 2019-07-09 RX ORDER — LIDOCAINE 40 MG/G
CREAM TOPICAL
Status: DISCONTINUED | OUTPATIENT
Start: 2019-07-09 | End: 2019-07-12 | Stop reason: HOSPADM

## 2019-07-09 RX ADMIN — SENNOSIDES AND DOCUSATE SODIUM 2 TABLET: 8.6; 5 TABLET ORAL at 20:01

## 2019-07-09 RX ADMIN — SODIUM CHLORIDE, POTASSIUM CHLORIDE, SODIUM LACTATE AND CALCIUM CHLORIDE: 600; 310; 30; 20 INJECTION, SOLUTION INTRAVENOUS at 08:06

## 2019-07-09 RX ADMIN — ONDANSETRON 4 MG: 2 INJECTION INTRAMUSCULAR; INTRAVENOUS at 15:50

## 2019-07-09 RX ADMIN — PROPOFOL 20 MG: 10 INJECTION, EMULSION INTRAVENOUS at 08:23

## 2019-07-09 RX ADMIN — CLINDAMYCIN PHOSPHATE 900 MG: 900 INJECTION, SOLUTION INTRAVENOUS at 23:39

## 2019-07-09 RX ADMIN — FENTANYL CITRATE 25 MCG: 50 INJECTION INTRAMUSCULAR; INTRAVENOUS at 16:50

## 2019-07-09 RX ADMIN — CARBIDOPA AND LEVODOPA 1 TABLET: 25; 100 TABLET ORAL at 23:39

## 2019-07-09 RX ADMIN — DOCUSATE SODIUM 100 MG: 100 CAPSULE, LIQUID FILLED ORAL at 20:01

## 2019-07-09 RX ADMIN — LIDOCAINE HYDROCHLORIDE 80 MG: 20 INJECTION, SOLUTION INFILTRATION; PERINEURAL at 08:21

## 2019-07-09 RX ADMIN — HYDRALAZINE HYDROCHLORIDE 4 MG: 20 INJECTION INTRAMUSCULAR; INTRAVENOUS at 12:03

## 2019-07-09 RX ADMIN — CLINDAMYCIN PHOSPHATE 900 MG: 18 INJECTION, SOLUTION INTRAVENOUS at 15:15

## 2019-07-09 RX ADMIN — HYDRALAZINE HYDROCHLORIDE 6 MG: 20 INJECTION INTRAMUSCULAR; INTRAVENOUS at 15:35

## 2019-07-09 RX ADMIN — ACETAMINOPHEN 650 MG: 325 TABLET, FILM COATED ORAL at 02:33

## 2019-07-09 RX ADMIN — FENTANYL CITRATE 50 MCG: 50 INJECTION INTRAMUSCULAR; INTRAVENOUS at 17:58

## 2019-07-09 RX ADMIN — VON WILLEBRAND FACTOR (RECOMBINANT) 3960 UNITS: KIT at 20:12

## 2019-07-09 RX ADMIN — HYDRALAZINE HYDROCHLORIDE 4 MG: 20 INJECTION INTRAMUSCULAR; INTRAVENOUS at 10:33

## 2019-07-09 RX ADMIN — CLINDAMYCIN PHOSPHATE 900 MG: 18 INJECTION, SOLUTION INTRAVENOUS at 09:27

## 2019-07-09 RX ADMIN — PROPOFOL 100 MCG/KG/MIN: 10 INJECTION, EMULSION INTRAVENOUS at 08:28

## 2019-07-09 RX ADMIN — PROPOFOL 20 MG: 10 INJECTION, EMULSION INTRAVENOUS at 08:34

## 2019-07-09 RX ADMIN — ROPINIROLE 2 MG: 2 TABLET, FILM COATED ORAL at 23:39

## 2019-07-09 RX ADMIN — ACETAMINOPHEN 975 MG: 325 TABLET, FILM COATED ORAL at 20:00

## 2019-07-09 RX ADMIN — PROPOFOL 20 MG: 10 INJECTION, EMULSION INTRAVENOUS at 08:36

## 2019-07-09 RX ADMIN — PROPOFOL 30 MG: 10 INJECTION, EMULSION INTRAVENOUS at 08:28

## 2019-07-09 RX ADMIN — LEVODOPA AND CARBIDOPA 2 CAPSULE: 145; 36.25 CAPSULE, EXTENDED RELEASE ORAL at 21:23

## 2019-07-09 RX ADMIN — HYDRALAZINE HYDROCHLORIDE 6 MG: 20 INJECTION INTRAMUSCULAR; INTRAVENOUS at 11:07

## 2019-07-09 RX ADMIN — PROPOFOL 20 MG: 10 INJECTION, EMULSION INTRAVENOUS at 08:25

## 2019-07-09 RX ADMIN — PROPOFOL 20 MG: 10 INJECTION, EMULSION INTRAVENOUS at 08:32

## 2019-07-09 RX ADMIN — DEXMEDETOMIDINE 0.6 MCG/KG/HR: 100 INJECTION, SOLUTION, CONCENTRATE INTRAVENOUS at 15:24

## 2019-07-09 RX ADMIN — NITROGLYCERIN 50 MCG: 10 INJECTION INTRAVENOUS at 15:51

## 2019-07-09 RX ADMIN — NITROGLYCERIN 50 MCG: 10 INJECTION INTRAVENOUS at 16:22

## 2019-07-09 RX ADMIN — LABETALOL HYDROCHLORIDE 10 MG: 5 INJECTION INTRAVENOUS at 15:44

## 2019-07-09 RX ADMIN — LABETALOL HYDROCHLORIDE 10 MG: 5 INJECTION INTRAVENOUS at 12:50

## 2019-07-09 RX ADMIN — LABETALOL HYDROCHLORIDE 10 MG: 5 INJECTION INTRAVENOUS at 09:57

## 2019-07-09 RX ADMIN — FENTANYL CITRATE 25 MCG: 50 INJECTION INTRAMUSCULAR; INTRAVENOUS at 17:06

## 2019-07-09 RX ADMIN — FENTANYL CITRATE 25 MCG: 50 INJECTION, SOLUTION INTRAMUSCULAR; INTRAVENOUS at 16:02

## 2019-07-09 RX ADMIN — LEVODOPA AND CARBIDOPA 2 CAPSULE: 145; 36.25 CAPSULE, EXTENDED RELEASE ORAL at 23:39

## 2019-07-09 RX ADMIN — LABETALOL HYDROCHLORIDE 10 MG: 5 INJECTION INTRAVENOUS at 10:08

## 2019-07-09 RX ADMIN — PROPOFOL 50 MG: 10 INJECTION, EMULSION INTRAVENOUS at 08:22

## 2019-07-09 RX ADMIN — OXYCODONE HYDROCHLORIDE 5 MG: 5 TABLET ORAL at 20:01

## 2019-07-09 RX ADMIN — PROPOFOL 30 MG: 10 INJECTION, EMULSION INTRAVENOUS at 15:24

## 2019-07-09 RX ADMIN — LABETALOL HYDROCHLORIDE 10 MG: 5 INJECTION INTRAVENOUS at 15:03

## 2019-07-09 RX ADMIN — SODIUM CHLORIDE: 9 INJECTION, SOLUTION INTRAVENOUS at 18:00

## 2019-07-09 RX ADMIN — SODIUM CHLORIDE, POTASSIUM CHLORIDE, SODIUM LACTATE AND CALCIUM CHLORIDE: 600; 310; 30; 20 INJECTION, SOLUTION INTRAVENOUS at 13:50

## 2019-07-09 ASSESSMENT — ACTIVITIES OF DAILY LIVING (ADL)
ADLS_ACUITY_SCORE: 11

## 2019-07-09 ASSESSMENT — PAIN DESCRIPTION - DESCRIPTORS: DESCRIPTORS: ACHING

## 2019-07-09 NOTE — OR NURSING
Patients night RN Concepcion Miramontes ([id number]) advised that patient did have his AM labs drawn to include T&S, vWF and F8.

## 2019-07-09 NOTE — PROGRESS NOTES
[facility]  [address] [place] [address]  Main: [phone], Fax: [phone]    Documentation Note:    Patient: Dion Villavicencio  MRN: [id number]  : 1960  Date of this note written: 2019    Brief history:  Dion is a 58 year old male with a history of Parkinson's disease who also has a history of severe Type I Von Willebrand Disease with his baseline VWF:Ag of 15% and VWF:Activities at <20%, admitted on 2019 in preparation for deep brain stimulator 2 stage procedure for today (2019).     Patient received one single dose of VonVendi at 60 Units/kg at 21:43 on 2019.     Labs done this morning (2019 at 06:10am):  Component      Latest Ref Rng & Units 2019   von Willebrand Factor Activity      50 - 180 % 80   Factor 8 Assay      55 - 200 % 87   von Willebrand Antigen      50 - 200 % 109     Recommendation:  1. Von Willebrand panel levels are adequate to proceed with surgery as planned this morning.   2. Check Von Willebrand Panel post operatively. Epic order placed.     Discussed with Dr. Anish Ma, he agrees with the above plan.       Vern Ding PA-C, MPAS  Physician Assistant  [facility].

## 2019-07-09 NOTE — PROGRESS NOTES
S: ready for surgery     O:  Exam:  BP (!) 154/101 (BP Location: Left arm)   Pulse 83   Temp 97.2  F (36.2  C) (Oral)   Resp 18   SpO2 97%     General: Awake;  Alert, In No Acute Distress  Pulm: Breathing Comfortably on room air  Mental status: Oriented x 3  Cranial Nerves: Cranial Nerves II-XII Intact Bilaterally  Strength: resting tremor of right hand > bilateral feet     Del Tr Bi WE WF Gr  R 5 5 5 5 5 5  L 5 5 5 5 5 5     HF KE KF DF PF   R 5 5 5 5 5   L 5 5 5 5 5     Pronator Drift: Absent  Sensory: Intact to Light Touch  Reflexes: No Hyperreflexia, Russo s or Clonus Present; Toes Down-Going Bilaterally  INCISION: n/a    Assessment:   Mr. Villavicencio is a 59 yo M with PMH Parkinson's disease, von Willebrand's disease presenting for von Willebrand's management 1 day prior to scheduled surgery on 7/9/19 for bilateral DBS phase 1 targeting bilateral subthalamic nucleus for Parkinson's disease.    Plan:   - Plan for OR 7/9/19 for bilateral DBS phase 1 as above  - von Willebrand infusion and labs per heme  - continue pta Parkinson's medications    Fernando Dubose MD  Neurosurgery Resident PGY2       Not applicable

## 2019-07-09 NOTE — ANESTHESIA POSTPROCEDURE EVALUATION
Anesthesia POST Procedure Evaluation    Patient: Dion Villavicencio   MRN:     [id number] Gender:   male   Age:    58 year old :      1960        Preoperative Diagnosis: Parkinson's Disease, Von Willebrand's Disease   Procedure(s):  Stealth Assisted Bilateral Deep Brain Stimulator Placement, Phase I, Placement Of Bilateral Deep Brain Stimulator Electrode, Target Bilateral Subthalamic Nucleus With Microelectrode Recording   Postop Comments: No value filed.       Anesthesia Type:  MAC  MAC    Reportable Event: NO     PAIN: Uncomplicated   Sign Out status: Comfortable, Well controlled pain     PONV: No PONV   Sign Out status:  No Nausea or Vomiting     Neuro/Psych: Uneventful perioperative course   Sign Out Status: Preoperative baseline; Age appropriate mentation     Airway/Resp.: Uneventful perioperative course   Sign Out Status: Non labored breathing, age appropriate RR; Resp. Status within EXPECTED Parameters     CV: Uneventful perioperative course   Sign Out status: Appropriate BP and perfusion indices; Appropriate HR/Rhythm     Disposition:   Sign Out in:  PACU  Disposition:  ICU  Recovery Course: Uneventful  Follow-Up: Not required           Last Anesthesia Record Vitals:  CRNA VITALS  2019 1547 - 2019 1641      2019             Resp Rate (set):  10          Last PACU Vitals:  Vitals Value Taken Time   /91 2019  4:30 PM   Temp 36.8  C (98.2  F) 2019  4:30 PM   Pulse 87 2019  4:30 PM   Resp 17 2019  4:30 PM   SpO2 95 % 2019  4:40 PM   Temp src     NIBP     Pulse     SpO2     Resp     Temp     Ht Rate     Temp 2     Vitals shown include unvalidated device data.      Electronically Signed By: Sidney Ashley MD, 2019, 4:41 PM

## 2019-07-09 NOTE — ANESTHESIA CARE TRANSFER NOTE
Patient: Dion Villavicencio    Procedure(s):  Stealth Assisted Bilateral Deep Brain Stimulator Placement, Phase I, Placement Of Bilateral Deep Brain Stimulator Electrode, Target Bilateral Subthalamic Nucleus With Microelectrode Recording    Diagnosis: Parkinson's Disease, Von Willebrand's Disease  Diagnosis Additional Information: No value filed.    Anesthesia Type:   No value filed.     Note:  Airway :Nasal Cannula  Patient transferred to:PACU  Comments: Anesthesia Care Transfer Note    Patient: Dion Villavicencio    Transferred to: PACU    Patient vital signs: stable    Airway: none    Monitors on, VSS, pt. Stable, Report given to PACU SUSIE Weiner CRNA  7/9/2019 4:24 PM      Handoff Report: Identifed the Patient, Identified the Reponsible Provider, Reviewed the pertinent medical history, Discussed the surgical course, Reviewed Intra-OP anesthesia mangement and issues during anesthesia, Set expectations for post-procedure period and Allowed opportunity for questions and acknowledgement of understanding      Vitals: (Last set prior to Anesthesia Care Transfer)    CRNA VITALS  7/9/2019 1547 - 7/9/2019 1624      7/9/2019             Resp Rate (set):  10                Electronically Signed By: DIETER Draper CRNA  July 9, 2019  4:24 PM

## 2019-07-09 NOTE — PLAN OF CARE
Status: Pt here for DBS placement tomorrow.  Vitals: VSS on RA. Hypertensive but within parameters.  Neuros: A&Ox4. 5/5 all extremities. BLE feet N/T at baseline. RUE and BLE tremors. R pupil > L pupil.  IV: PIV SL. Pt received factor 8 IV tonight at 10 pm.  Resp/trach: Stable on RA. LS clear all fields.  Diet: Regular. NPO at midnight.  Bowel status: No BM reported this shift. BS active all quads.  : Voids spontaneously.  Skin: Skin intact.  Pain: Denies.  Activity: Up IND in room. Pt received pre-op shower #1 this shift.  Social: Wife and sons visited earlier this evening. Involved and supportive with cares.  Plan: Deep brain stimulation procedure tomorrow morning scheduled for 6 am. Pt needs pre-op shower #2 around 2 am. Continue to monitor and follow POC.

## 2019-07-09 NOTE — BRIEF OP NOTE
[facility], [place]    Brief Operative Note    Pre-operative diagnosis: Parkinson's Disease, Von Willebrand's Disease  Post-operative diagnosis Parkinson's Disease, Von Willebrand's Disease  Procedure: Procedure(s):  Stealth Assisted Bilateral Deep Brain Stimulator Placement, Phase I, Placement Of Bilateral Deep Brain Stimulator Electrode, Target Bilateral Subthalamic Nucleus With Microelectrode Recording  Surgeon: Surgeon(s) and Role:     * Sidney Cardozo MD - Primary     * Lawrence Randle MD - Resident - Assisting  Anesthesia: Combined MAC with Local   Estimated blood loss: 50 mL  Drains: None  Specimens: * No specimens in log *  Findings:   Left side, medial Davonte Gun, 0.71 mm below target.  Right side, center Davonte Gun, 0.23 mm below target.  No problems found.  Right side Guardian cover had to be replaced due to screws breaking off.  Complications: None.  Implants:    Implant Name Type Inv. Item Serial No.  Lot No. LRB No. Used   IMP BUR HOLE COVER GUARDIAN 6010ANS Metallic Hardware/[place] [facility] IMP BUR HOLE COVER GUARDIAN 6010ANS NONE Intrapace [id number] Right 1   IMP BUR HOLE COVER GUARDIAN 6010ANS Metallic Hardware/[facility] IMP BUR HOLE COVER GUARDIAN 6010ANS NONE Intrapace [id number] Right 1   IMP BUR HOLE COVER GUARDIAN 6010ANS Metallic Hardware/[facility] IMP BUR HOLE COVER GUARDIAN 6010ANS NONE Intrapace [id number] Left 1   KIT DBS LEAD DBS 8CH 40CM 1-3,3-1 SPACE 0.5 BLACK 6172ANS Leads KIT DBS LEAD DBS 8CH 40CM 1-3,3-1 SPACE 0.5 BLACK 6172ANS [id number] [facility] NONE Left 1   KIT DBS LEAD DBS 8CH 40CM 1-3,3-1 SPACE 0.5 BLACK 6172ANS Leads KIT DBS LEAD DBS 8CH 40CM 1-3,3-1 SPACE 0.5 BLACK 6172ANS [id number] [facility]  Right 1

## 2019-07-09 NOTE — CONSULTS
"  Hematology  Consult Note   Date of Service: 07/10/2019    Patient: Dion Villavicencio  MRN: [id number]  Admission Date: 7/8/2019  Hospital Day # 2    Reason for Consult: \"von Willebrand's disease management periop\"      History of Present Illness:    Dion Villavicencio is a 58 year old man with history of von Willebrand's disease (type I) from Parkinson's disease who was admitted for deep brain stimulator implantation.  Patient has been followed this admission by Vern Ding on behalf of the [facility]; he is away for the remainder of the week and the heme consult service will be assuming management of the patient's von Willebrand's disease.  Patient tolerated his initial procedure earlier this admission without complication.  He did receive vovendi prior to the procedure.    Today patient reports no major concerns, is looking forward to the next procedure and then leaving the hospital.  Wife/children present at bedside, state no major concerns since surgery.  Patient/family have not noted an excessive bleeding.    Review of Systems:  Patient endorses fatigue but states he is otherwise doing relatively well, wonders when he will go home.    Denies headache/dizziness, vision changes, numbness/tingling, fevers/chills, chest pain, shortness of breath, nausea/vomiting, constipation/diarrhea, abdominal pain, dysuria/urinary frequency, joint pain, rashes or any other acute concerns.      Past Medical History:  Parkinson's disease, VWF type I    Past Surgical History:  [place] teeth removal; deep brain stimulator placement 7/9/2019    Social History:  Lives at home with wife.    Family History  Not addressed today    Outpatient Medications:    No current facility-administered medications on file prior to encounter.   Current Outpatient Medications on File Prior to Encounter:  amantadine (SYMMETREL) 100 MG capsule Take 100 mg by mouth daily (with lunch) 100mg capsule by mouth daily @ 11am-1230pm "   carbidopa-levodopa (SINEMET)  MG per tablet Take 1 tablet by mouth 4 times daily 25/100 tab by mouth @ 7-830am, 1pm and 11pm-1230am and extra if needed = 4/day   carbidopa-levodopa ER (RYTARY) 36. MG CPCR per CR capsule Take 2-4 capsules by mouth 5 times daily 4 capsules @ 7-830am, 11am-1230p and 3-430pm; and 2 capsules @ 7-830pm and 11pm-1230am   Cholecalciferol (VITAMIN D) 2000 units tablet Take 2,000 Units by mouth every morning 2000 units by mouth daily @ 7-830am   ibuprofen (ADVIL/MOTRIN) 200 MG tablet Take 200 mg by mouth every 4 hours as needed for mild pain   Multiple Vitamins-Minerals (CENTRUM SILVER) per tablet Take 1 tablet by mouth every morning Centrum vitamin by mouth daily @ 7-830am   rOPINIRole (REQUIP) 2 MG tablet Take 2 mg by mouth 3 times daily 2mg tab by mouth @ 7-830am, 3-430pm and 11pm-1230am        Physical Exam:    Blood pressure 138/88, pulse 104, temperature 98  F (36.7  C), temperature source Oral, resp. rate 16, SpO2 93 %.  General: alert and cooperative, sitting up in bed, no acute distress  HEENT: sclera anicteric, EOMI, MMM  CV: RRR, no murmurs  Resp: CTAB, normal respiratory effort on ambient air  GI: soft, non-tender, non-distended, bowel sounds present and normoactive  MSK: warm and well-perfused, no edema  Skin: Incision with granulation tissue on scalp.  No rashes on visualized areas, no jaundice    Labs & Studies: I personally reviewed the following studies:  ROUTINE LABS (Last four results):  CMP  Recent Labs   Lab 07/10/19  0311 07/09/19  0610    138   POTASSIUM 3.9 3.8   CHLORIDE 110* 107   CO2 23 25   ANIONGAP 8 7   * 114*   BUN 16 21   CR 0.89 0.88   GFRESTIMATED >90 >90   GFRESTBLACK >90 >90   ELYSIA 8.4* 8.6   MAG 2.3  --    PHOS 4.7*  --      CBC  Recent Labs   Lab 07/10/19  0311 07/09/19  0610   WBC 10.5 8.5   RBC 5.41 5.88   HGB 16.4 17.9*   HCT 51.3 56.7*   MCV 95 96   MCH 30.3 30.4   MCHC 32.0 31.6   RDW 12.8 12.4    283     INRNo lab  results found in last 7 days.    Assessment & Plan:   Dion Villavicencio is a 58 year old man with history of von Willebrand's disease (type I) from Parkinson's disease who was admitted for deep brain stimulator implantation.    # VWF (type I)  # Parkinson's disease with plan for repeat OR 7/11/2019  His VWF activity level is 128% and does not require further treatment today.  We will plan to recheck VW F activity antigen and factor VIII assay in the a.m.  If factor activity levels are insufficient we will place orders for an additional infusion of vovendi.  Currently planned for OR at 1300 tomorrow.  - Check CWF activity, antigen, F8 assay (orders placed)  - We will place vovendi infusion orders if required prior to OR    We will continue to follow this patient while hospitalized. Please do not hesitate to page with any questions or concerns.    Patient was seen and plan of care was discussed with attending physician Dr. Izaguirre.    Del Mendes MD  Heme/Onc Fellow  Pager [phone]

## 2019-07-09 NOTE — OR NURSING
The following page sent to Dr. Cardozo: Saud T-preop room 14. labs posted. please review and call if further orders. thanks. [facility] [id number]

## 2019-07-09 NOTE — PROGRESS NOTES
Pt brought to CT prior to ICU. Pt upset that he needed a head CT and that DR Cardozo didn't inform him. Pt was escorted by RN and NST monitored.

## 2019-07-09 NOTE — PROGRESS NOTES
[facility]  [address] [place], [place] [id number]  Main: [phone], Fax: [phone]    Documentation Note:    Patient: Dion Villavicencio  MRN: [id number]  : 1960  Date of this note written: 2019  Time: 18:35    Component      Latest Ref Rng & Units 2019           6:10 AM  5:01 PM   von Willebrand Factor Activity      50 - 180 % 80 57   Factor 8 Assay      55 - 200 % 87 126   von Willebrand Antigen      50 - 200 % 109 98     Patient's post operative VWF:Acitivity is 57%, Factor 8 Assay is 126% and VWF:Ag is 98%.     This writer spoke with Dr. Anish Ma, staff hematologist who recommends re-dose the patient with VONVENDI at 40 Units/kg (about 4000 Units) if the product is available, OR if VONVENDI is not available at the inpatient pharmacy, then would re-dose him with Humate-P at 40 RCo Units/kg (about 4000 Units).     This writer has written orders in Epic for VONVENDI at 40 Units/kg to be infused tonight (2019). Recheck VWF panel on 7/10/2019 morning (order in Epic). Recheck VWF panel on 2019 morning (order in Epic). Currently his 2nd stage surgery is scheduled on 2019 at 13:00.     This writer will be out of town for the rest of the week starting on 7/10/2019. Please contact Dr. Anish Ma at [phone] or Dr. Opal Izaguirre at [phone] if there is any questions or concerns.       Vern Ding PA-C, MPAS  Physician Assistant  [facility].

## 2019-07-09 NOTE — PLAN OF CARE
VSS except HTN within parameters.  Wears CPAP HS.  PRN Tylenol given x 1 for R anterior headache with some relief.  R pupil>L and intermittent tremors to RUE and BLE.  Denied n/t overnight.  PIV SL.  NPO since MN.  Voiding spontaneously.  Had BM overnight.  Up ad johnson; gait more shuffled yet steady as Parkinson meds wear off.  Pre-op checklist and admit rec completed.  Pt transported by [facility] to .  Family took all of pt belongings with them.

## 2019-07-10 LAB
ANION GAP SERPL CALCULATED.3IONS-SCNC: 8 MMOL/L (ref 3–14)
BASOPHILS # BLD AUTO: 0 10E9/L (ref 0–0.2)
BASOPHILS NFR BLD AUTO: 0.3 %
BUN SERPL-MCNC: 16 MG/DL (ref 7–30)
CALCIUM SERPL-MCNC: 8.4 MG/DL (ref 8.5–10.1)
CHLORIDE SERPL-SCNC: 110 MMOL/L (ref 94–109)
CO2 SERPL-SCNC: 23 MMOL/L (ref 20–32)
CREAT SERPL-MCNC: 0.89 MG/DL (ref 0.66–1.25)
DIFFERENTIAL METHOD BLD: NORMAL
EOSINOPHIL # BLD AUTO: 0.1 10E9/L (ref 0–0.7)
EOSINOPHIL NFR BLD AUTO: 0.6 %
ERYTHROCYTE [DISTWIDTH] IN BLOOD BY AUTOMATED COUNT: 12.8 % (ref 10–15)
FACT VIII ACT/NOR PPP: 138 % (ref 55–200)
GFR SERPL CREATININE-BSD FRML MDRD: >90 ML/MIN/{1.73_M2}
GLUCOSE SERPL-MCNC: 140 MG/DL (ref 70–99)
HCT VFR BLD AUTO: 51.3 % (ref 40–53)
HGB BLD-MCNC: 16.4 G/DL (ref 13.3–17.7)
IMM GRANULOCYTES # BLD: 0.1 10E9/L (ref 0–0.4)
IMM GRANULOCYTES NFR BLD: 0.5 %
LYMPHOCYTES # BLD AUTO: 0.9 10E9/L (ref 0.8–5.3)
LYMPHOCYTES NFR BLD AUTO: 8.6 %
MAGNESIUM SERPL-MCNC: 2.3 MG/DL (ref 1.6–2.3)
MCH RBC QN AUTO: 30.3 PG (ref 26.5–33)
MCHC RBC AUTO-ENTMCNC: 32 G/DL (ref 31.5–36.5)
MCV RBC AUTO: 95 FL (ref 78–100)
MONOCYTES # BLD AUTO: 1.2 10E9/L (ref 0–1.3)
MONOCYTES NFR BLD AUTO: 11.3 %
NEUTROPHILS # BLD AUTO: 8.3 10E9/L (ref 1.6–8.3)
NEUTROPHILS NFR BLD AUTO: 78.7 %
NRBC # BLD AUTO: 0 10*3/UL
NRBC BLD AUTO-RTO: 0 /100
PHOSPHATE SERPL-MCNC: 4.7 MG/DL (ref 2.5–4.5)
PLATELET # BLD AUTO: 279 10E9/L (ref 150–450)
POTASSIUM SERPL-SCNC: 3.9 MMOL/L (ref 3.4–5.3)
RBC # BLD AUTO: 5.41 10E12/L (ref 4.4–5.9)
SODIUM SERPL-SCNC: 141 MMOL/L (ref 133–144)
VWF CBA/VWF AG PPP IA-RTO: 167 % (ref 50–200)
VWF:AC ACT/NOR PPP IA: 128 % (ref 50–180)
WBC # BLD AUTO: 10.5 10E9/L (ref 4–11)

## 2019-07-10 PROCEDURE — 25000132 ZZH RX MED GY IP 250 OP 250 PS 637: Performed by: STUDENT IN AN ORGANIZED HEALTH CARE EDUCATION/TRAINING PROGRAM

## 2019-07-10 PROCEDURE — 36415 COLL VENOUS BLD VENIPUNCTURE: CPT | Performed by: STUDENT IN AN ORGANIZED HEALTH CARE EDUCATION/TRAINING PROGRAM

## 2019-07-10 PROCEDURE — 36415 COLL VENOUS BLD VENIPUNCTURE: CPT | Performed by: PHYSICIAN ASSISTANT

## 2019-07-10 PROCEDURE — 85240 CLOT FACTOR VIII AHG 1 STAGE: CPT | Performed by: PHYSICIAN ASSISTANT

## 2019-07-10 PROCEDURE — 00000328 ZZHCL STATISTIC PTT NC: Performed by: PHYSICIAN ASSISTANT

## 2019-07-10 PROCEDURE — 83735 ASSAY OF MAGNESIUM: CPT | Performed by: STUDENT IN AN ORGANIZED HEALTH CARE EDUCATION/TRAINING PROGRAM

## 2019-07-10 PROCEDURE — 85245 CLOT FACTOR VIII VW RISTOCTN: CPT | Performed by: PHYSICIAN ASSISTANT

## 2019-07-10 PROCEDURE — 25000125 ZZHC RX 250: Performed by: STUDENT IN AN ORGANIZED HEALTH CARE EDUCATION/TRAINING PROGRAM

## 2019-07-10 PROCEDURE — 12000001 ZZH R&B MED SURG/OB UMMC

## 2019-07-10 PROCEDURE — 85025 COMPLETE CBC W/AUTO DIFF WBC: CPT | Performed by: STUDENT IN AN ORGANIZED HEALTH CARE EDUCATION/TRAINING PROGRAM

## 2019-07-10 PROCEDURE — 85246 CLOT FACTOR VIII VW ANTIGEN: CPT | Performed by: PHYSICIAN ASSISTANT

## 2019-07-10 PROCEDURE — 84100 ASSAY OF PHOSPHORUS: CPT | Performed by: STUDENT IN AN ORGANIZED HEALTH CARE EDUCATION/TRAINING PROGRAM

## 2019-07-10 PROCEDURE — 80048 BASIC METABOLIC PNL TOTAL CA: CPT | Performed by: STUDENT IN AN ORGANIZED HEALTH CARE EDUCATION/TRAINING PROGRAM

## 2019-07-10 PROCEDURE — 00000401 ZZHCL STATISTIC THROMBIN TIME NC: Performed by: PHYSICIAN ASSISTANT

## 2019-07-10 PROCEDURE — 00000167 ZZHCL STATISTIC INR NC: Performed by: PHYSICIAN ASSISTANT

## 2019-07-10 PROCEDURE — 25800030 ZZH RX IP 258 OP 636: Performed by: STUDENT IN AN ORGANIZED HEALTH CARE EDUCATION/TRAINING PROGRAM

## 2019-07-10 RX ORDER — CLINDAMYCIN PHOSPHATE 900 MG/50ML
900 INJECTION, SOLUTION INTRAVENOUS SEE ADMIN INSTRUCTIONS
Status: DISCONTINUED | OUTPATIENT
Start: 2019-07-10 | End: 2019-07-11 | Stop reason: HOSPADM

## 2019-07-10 RX ORDER — CLINDAMYCIN PHOSPHATE 900 MG/50ML
900 INJECTION, SOLUTION INTRAVENOUS
Status: DISCONTINUED | OUTPATIENT
Start: 2019-07-10 | End: 2019-07-11 | Stop reason: HOSPADM

## 2019-07-10 RX ADMIN — ACETAMINOPHEN 975 MG: 325 TABLET, FILM COATED ORAL at 04:33

## 2019-07-10 RX ADMIN — AMANTADINE HYDROCHLORIDE 100 MG: 100 CAPSULE ORAL at 12:21

## 2019-07-10 RX ADMIN — LEVODOPA AND CARBIDOPA 2 CAPSULE: 145; 36.25 CAPSULE, EXTENDED RELEASE ORAL at 19:42

## 2019-07-10 RX ADMIN — CARBIDOPA AND LEVODOPA 1 TABLET: 25; 100 TABLET ORAL at 08:01

## 2019-07-10 RX ADMIN — SENNOSIDES AND DOCUSATE SODIUM 2 TABLET: 8.6; 5 TABLET ORAL at 19:44

## 2019-07-10 RX ADMIN — CLINDAMYCIN PHOSPHATE 900 MG: 900 INJECTION, SOLUTION INTRAVENOUS at 06:34

## 2019-07-10 RX ADMIN — ROPINIROLE 2 MG: 2 TABLET, FILM COATED ORAL at 22:50

## 2019-07-10 RX ADMIN — CARBIDOPA AND LEVODOPA 1 TABLET: 25; 100 TABLET ORAL at 22:50

## 2019-07-10 RX ADMIN — SODIUM CHLORIDE: 9 INJECTION, SOLUTION INTRAVENOUS at 05:19

## 2019-07-10 RX ADMIN — LEVODOPA AND CARBIDOPA 4 CAPSULE: 145; 36.25 CAPSULE, EXTENDED RELEASE ORAL at 12:25

## 2019-07-10 RX ADMIN — DOCUSATE SODIUM 100 MG: 100 CAPSULE, LIQUID FILLED ORAL at 08:02

## 2019-07-10 RX ADMIN — SENNOSIDES AND DOCUSATE SODIUM 2 TABLET: 8.6; 5 TABLET ORAL at 08:02

## 2019-07-10 RX ADMIN — ROPINIROLE 2 MG: 2 TABLET, FILM COATED ORAL at 16:27

## 2019-07-10 RX ADMIN — ACETAMINOPHEN 975 MG: 325 TABLET, FILM COATED ORAL at 12:21

## 2019-07-10 RX ADMIN — POTASSIUM CHLORIDE 20 MEQ: 750 TABLET, EXTENDED RELEASE ORAL at 04:33

## 2019-07-10 RX ADMIN — CARBIDOPA AND LEVODOPA 1 TABLET: 25; 100 TABLET ORAL at 12:21

## 2019-07-10 RX ADMIN — LEVODOPA AND CARBIDOPA 4 CAPSULE: 145; 36.25 CAPSULE, EXTENDED RELEASE ORAL at 08:39

## 2019-07-10 RX ADMIN — VITAMIN D, TAB 1000IU (100/BT) 2000 UNITS: 25 TAB at 08:01

## 2019-07-10 RX ADMIN — LEVODOPA AND CARBIDOPA 4 CAPSULE: 145; 36.25 CAPSULE, EXTENDED RELEASE ORAL at 16:27

## 2019-07-10 RX ADMIN — LEVODOPA AND CARBIDOPA 2 CAPSULE: 145; 36.25 CAPSULE, EXTENDED RELEASE ORAL at 22:50

## 2019-07-10 RX ADMIN — ROPINIROLE 2 MG: 2 TABLET, FILM COATED ORAL at 08:01

## 2019-07-10 RX ADMIN — DOCUSATE SODIUM 100 MG: 100 CAPSULE, LIQUID FILLED ORAL at 19:44

## 2019-07-10 RX ADMIN — ACETAMINOPHEN 975 MG: 325 TABLET, FILM COATED ORAL at 19:43

## 2019-07-10 RX ADMIN — CLINDAMYCIN PHOSPHATE 900 MG: 900 INJECTION, SOLUTION INTRAVENOUS at 16:27

## 2019-07-10 ASSESSMENT — ACTIVITIES OF DAILY LIVING (ADL)
ADLS_ACUITY_SCORE: 16
ADLS_ACUITY_SCORE: 16
ADLS_ACUITY_SCORE: 15
ADLS_ACUITY_SCORE: 16

## 2019-07-10 NOTE — PLAN OF CARE
D/I: Patient on unit 4A Surgical/Neuro ICU     Neuro:Lethargic, Pt takes time to arouse but intact; A&O; Follows commands; +5 strength in all extremites; Occasional tremors in RUE; Denies Headache, numbness and tingling.   CV: Afebrile; BPs nominal; NSR, absent ectopy; CMS intact; Localized edema in L.face; Good pulses.   Pulm: 2L NC; Pt has hx of OCTAVIO, EtCO2 35-40; LS clear/dim.  GI: Clear liquid diet; BS active; No BM overnight.   Endo: N/A  : Guerra removed at 0630, nominal output overnight.   Skin: Multiple incision on cranium;    Pain: Oxy available but will make patient incredibly lethargic.    Access: L.PIV  Drains: N/A  Gtt: .   Labs/Replacement: K 3.9, replaced PO.   Social: Wife and sons at bedside overnight.        A: Stable    P: Will continue to monitor Pt closely and notify MD of any significant changes.

## 2019-07-10 NOTE — PROGRESS NOTES
"S: reported incisional pain yesterday.     O:  Temp:  [97.6  F (36.4  C)-99.3  F (37.4  C)] 97.6  F (36.4  C)  Pulse:  [74-91] 85  Heart Rate:  [73-89] 73  Resp:  [13-20] 15  BP: (110-139)/(76-98) 125/90  SpO2:  [92 %-96 %] 96 %    Exam:  General: Awake;  Alert, In No Acute Distress  Pulm: Breathing Comfortably   Mental status: Oriented x 3  HEENT: some left facial swelling and periorbital swelling present   Cranial Nerves: able to open and close eyes symmetrically. Tongue protrusion, shoulder shrug, and smile symmetric. No dysarthria. Extraocular muscles intact. Pupils reactive bilaterally   Strength:      Del Tr Bi WE WF Gr  R 5 5 5 5 5 5  L 5 5 5 5 5 5     HF KE KF DF PF   R 5 5 5 5 5   L 5 5 5 5 5   Pronator Drift: Absent  Sensory: Intact to Light Touch  INCISION: bilateral frontal scalp incisions are clean/dry/intact with cyanoacrylate present     Assessment:   57 y/o M w/ hx of Parkinson s and von Willebrand s disease s/p DBS phase I. Doing well.     Plan:     Neuro:   Sutures absorbable  PT/OT  Home amantadine, sinemet, rytary, ropinirole   Cardiovascular: blood pressure goals: SBP < 140    Pulmonary: Incentive spirometry     Gastrointestinal: regular diet     Renal: discontinue adams; monitor I/O    Heme: von willebrand panel post-op. Factor replacement per hematology.     Endocrine: No issues    Infectious clindamycin for ajay-op prophylaxis     PT/OT: pending post-operative evaluation     DVT prophylaxis: Sequential compression devices    Ulcer prophylaxis:     DISPO: possible transfer to      Barriers: phase 2 surgery     Mendoza \"Balaji\" MD [facility]   Neurosurgery, PGY-3    Please contact neurosurgery resident on call with questions.    Dial * * [phone], enter [phone] when prompted.       "

## 2019-07-10 NOTE — PLAN OF CARE
Shift: 1200 - 1530  VS: Temp: 97.5  F (36.4  C) Temp src: Oral BP: 144/87 Pulse: 93 Heart Rate: 92 Resp: 16 SpO2: 93 % O2 Device: None (Room air) Oxygen Delivery: 2 LPM  Pain: Denies pain. Resting comfortably.   Neuro: Orientedx4, currently sleeping, wakes to voice.   Cardiac:   Tachy at times, OVSS.   Respiratory: Lung sounds clear on RA. Wears CPAP at home, OCTAVIO, using oxygen at night. Snores loudly.  GI/Diet/Appetite: Regular diet with good appetite. Denies nausea. LBM 7/8, passing gas.   :  Voiding w/o difficulty.   LDA's: PIV to LUE, SL.  Skin: Incision to right and left anterior scalp, sutures, approximated, no drainage.   Activity: Ax1 w/GB. Bed alarm in use.   Tests/Procedures:   Pertinent Labs/Lab Collection:      Plan: Continue w/POC.

## 2019-07-10 NOTE — PLAN OF CARE
Admitted/transferred from: PACU  Reason for admission/transfer: Patient had bilateral DBS placement (phase 1) today.  Patient status upon admission/transfer: stable upon arrival. VSS. Neuros intact. Right pupil greater than left pupil. Baseline tremors noted in RUE and bilateral lower extremities. Patient reported pain was manageable without intervention at this time.  Plan: Plan is for neuro checks every hour over night. Administer medications when available. Notify MD of changes.   2 RN skin assessment: completed by Dora Marrero and Faizan (Charge nurse)  Result of skin assessment and interventions/actions: Patient has a protective mepilex on coccyx with intact, non reddened skin underneath. Heels are dry and cracked. 2 bilateral head incisions that are sutured and halo pin sites (anterior sites covered with a primapore).  Patient belongings (see Flowsheet - Adult Profile for details): in bag in patient's room.            Problem: Obstructive Sleep Apnea Risk or Actual (Comorbidity Management)  Goal: Unobstructed Breathing During Sleep  Outcome: No Change     Problem: Hematological Disorder  Goal: Hematological Disorder  Description  Patient comorbidity will be monitored for signs and symptoms of Hematogical condition.  Problems will be absent, minimized or managed by discharge/transition of care.  Outcome: No Change     Problem: Pain Acute  Goal: Optimal Pain Control  7/9/2019 1931 by Dora Marrero, RN  Outcome: No Change  7/9/2019 0750 by Maxine Miramontes, RN  Outcome: No Change

## 2019-07-10 NOTE — PLAN OF CARE
POD 1 DBS phase 1, neuro is intact, denies pain, oxy, tyl and dilaudid available if needed, tolerating regular diet, ambulated on the hallaway with SBA, anterior head incision is sutured, CDI, so mild swelling noted on the left side of the face and head, team aware of the swelling, +BS, voiding without saving, scheduled DBS Phase 2 tomorrow. transferred care to [facility] 6A, Cont with POC

## 2019-07-11 ENCOUNTER — ANESTHESIA (OUTPATIENT)
Dept: SURGERY | Facility: CLINIC | Age: 59
DRG: 026 | End: 2019-07-11
Payer: COMMERCIAL

## 2019-07-11 ENCOUNTER — ANESTHESIA EVENT (OUTPATIENT)
Dept: SURGERY | Facility: CLINIC | Age: 59
DRG: 026 | End: 2019-07-11
Payer: COMMERCIAL

## 2019-07-11 LAB
ANION GAP SERPL CALCULATED.3IONS-SCNC: 6 MMOL/L (ref 3–14)
BASOPHILS # BLD AUTO: 0 10E9/L (ref 0–0.2)
BASOPHILS NFR BLD AUTO: 0.3 %
BUN SERPL-MCNC: 14 MG/DL (ref 7–30)
CALCIUM SERPL-MCNC: 8.8 MG/DL (ref 8.5–10.1)
CHLORIDE SERPL-SCNC: 110 MMOL/L (ref 94–109)
CO2 SERPL-SCNC: 24 MMOL/L (ref 20–32)
CREAT SERPL-MCNC: 0.8 MG/DL (ref 0.66–1.25)
DIFFERENTIAL METHOD BLD: ABNORMAL
EOSINOPHIL # BLD AUTO: 0.1 10E9/L (ref 0–0.7)
EOSINOPHIL NFR BLD AUTO: 0.5 %
ERYTHROCYTE [DISTWIDTH] IN BLOOD BY AUTOMATED COUNT: 12.9 % (ref 10–15)
FACT VIII ACT/NOR PPP: 201 % (ref 55–200)
GFR SERPL CREATININE-BSD FRML MDRD: >90 ML/MIN/{1.73_M2}
GLUCOSE SERPL-MCNC: 142 MG/DL (ref 70–99)
HCT VFR BLD AUTO: 52 % (ref 40–53)
HGB BLD-MCNC: 16.4 G/DL (ref 13.3–17.7)
IMM GRANULOCYTES # BLD: 0.1 10E9/L (ref 0–0.4)
IMM GRANULOCYTES NFR BLD: 0.5 %
LYMPHOCYTES # BLD AUTO: 1 10E9/L (ref 0.8–5.3)
LYMPHOCYTES NFR BLD AUTO: 8.1 %
MCH RBC QN AUTO: 30.3 PG (ref 26.5–33)
MCHC RBC AUTO-ENTMCNC: 31.5 G/DL (ref 31.5–36.5)
MCV RBC AUTO: 96 FL (ref 78–100)
MONOCYTES # BLD AUTO: 1.4 10E9/L (ref 0–1.3)
MONOCYTES NFR BLD AUTO: 10.8 %
NEUTROPHILS # BLD AUTO: 10.2 10E9/L (ref 1.6–8.3)
NEUTROPHILS NFR BLD AUTO: 79.8 %
NRBC # BLD AUTO: 0 10*3/UL
NRBC BLD AUTO-RTO: 0 /100
PLATELET # BLD AUTO: 237 10E9/L (ref 150–450)
POTASSIUM SERPL-SCNC: 3.8 MMOL/L (ref 3.4–5.3)
RBC # BLD AUTO: 5.41 10E12/L (ref 4.4–5.9)
SODIUM SERPL-SCNC: 140 MMOL/L (ref 133–144)
VWF CBA/VWF AG PPP IA-RTO: 155 % (ref 50–200)
VWF:AC ACT/NOR PPP IA: 106 % (ref 50–180)
WBC # BLD AUTO: 12.8 10E9/L (ref 4–11)

## 2019-07-11 PROCEDURE — 36415 COLL VENOUS BLD VENIPUNCTURE: CPT | Performed by: STUDENT IN AN ORGANIZED HEALTH CARE EDUCATION/TRAINING PROGRAM

## 2019-07-11 PROCEDURE — 25000132 ZZH RX MED GY IP 250 OP 250 PS 637: Performed by: STUDENT IN AN ORGANIZED HEALTH CARE EDUCATION/TRAINING PROGRAM

## 2019-07-11 PROCEDURE — 25000566 ZZH SEVOFLURANE, EA 15 MIN: Performed by: NEUROLOGICAL SURGERY

## 2019-07-11 PROCEDURE — 12000001 ZZH R&B MED SURG/OB UMMC

## 2019-07-11 PROCEDURE — 0JH60MZ INSERTION OF STIMULATOR GENERATOR INTO CHEST SUBCUTANEOUS TISSUE AND FASCIA, OPEN APPROACH: ICD-10-PCS | Performed by: NEUROLOGICAL SURGERY

## 2019-07-11 PROCEDURE — 85245 CLOT FACTOR VIII VW RISTOCTN: CPT | Performed by: STUDENT IN AN ORGANIZED HEALTH CARE EDUCATION/TRAINING PROGRAM

## 2019-07-11 PROCEDURE — 37000008 ZZH ANESTHESIA TECHNICAL FEE, 1ST 30 MIN: Performed by: NEUROLOGICAL SURGERY

## 2019-07-11 PROCEDURE — 00000328 ZZHCL STATISTIC PTT NC: Performed by: STUDENT IN AN ORGANIZED HEALTH CARE EDUCATION/TRAINING PROGRAM

## 2019-07-11 PROCEDURE — 27211024 ZZHC OR SUPPLY OTHER OPNP: Performed by: NEUROLOGICAL SURGERY

## 2019-07-11 PROCEDURE — 00000401 ZZHCL STATISTIC THROMBIN TIME NC: Performed by: STUDENT IN AN ORGANIZED HEALTH CARE EDUCATION/TRAINING PROGRAM

## 2019-07-11 PROCEDURE — 25000125 ZZHC RX 250: Performed by: NURSE ANESTHETIST, CERTIFIED REGISTERED

## 2019-07-11 PROCEDURE — 25000128 H RX IP 250 OP 636: Performed by: NURSE ANESTHETIST, CERTIFIED REGISTERED

## 2019-07-11 PROCEDURE — 25800030 ZZH RX IP 258 OP 636: Performed by: NURSE ANESTHETIST, CERTIFIED REGISTERED

## 2019-07-11 PROCEDURE — 25000125 ZZHC RX 250: Performed by: STUDENT IN AN ORGANIZED HEALTH CARE EDUCATION/TRAINING PROGRAM

## 2019-07-11 PROCEDURE — 85246 CLOT FACTOR VIII VW ANTIGEN: CPT | Performed by: STUDENT IN AN ORGANIZED HEALTH CARE EDUCATION/TRAINING PROGRAM

## 2019-07-11 PROCEDURE — 36000059 ZZH SURGERY LEVEL 3 EA 15 ADDTL MIN UMMC: Performed by: NEUROLOGICAL SURGERY

## 2019-07-11 PROCEDURE — 25000128 H RX IP 250 OP 636: Performed by: NEUROLOGICAL SURGERY

## 2019-07-11 PROCEDURE — 85240 CLOT FACTOR VIII AHG 1 STAGE: CPT | Performed by: STUDENT IN AN ORGANIZED HEALTH CARE EDUCATION/TRAINING PROGRAM

## 2019-07-11 PROCEDURE — 00000167 ZZHCL STATISTIC INR NC: Performed by: STUDENT IN AN ORGANIZED HEALTH CARE EDUCATION/TRAINING PROGRAM

## 2019-07-11 PROCEDURE — C1883 ADAPT/EXT, PACING/NEURO LEAD: HCPCS | Performed by: NEUROLOGICAL SURGERY

## 2019-07-11 PROCEDURE — 27210794 ZZH OR GENERAL SUPPLY STERILE: Performed by: NEUROLOGICAL SURGERY

## 2019-07-11 PROCEDURE — 25000125 ZZHC RX 250: Performed by: NEUROLOGICAL SURGERY

## 2019-07-11 PROCEDURE — 40000171 ZZH STATISTIC PRE-PROCEDURE ASSESSMENT III: Performed by: NEUROLOGICAL SURGERY

## 2019-07-11 PROCEDURE — 37000009 ZZH ANESTHESIA TECHNICAL FEE, EACH ADDTL 15 MIN: Performed by: NEUROLOGICAL SURGERY

## 2019-07-11 PROCEDURE — C1767 GENERATOR, NEURO NON-RECHARG: HCPCS | Performed by: NEUROLOGICAL SURGERY

## 2019-07-11 PROCEDURE — 80048 BASIC METABOLIC PNL TOTAL CA: CPT | Performed by: STUDENT IN AN ORGANIZED HEALTH CARE EDUCATION/TRAINING PROGRAM

## 2019-07-11 PROCEDURE — 71000014 ZZH RECOVERY PHASE 1 LEVEL 2 FIRST HR: Performed by: NEUROLOGICAL SURGERY

## 2019-07-11 PROCEDURE — 36000057 ZZH SURGERY LEVEL 3 1ST 30 MIN - UMMC: Performed by: NEUROLOGICAL SURGERY

## 2019-07-11 PROCEDURE — 85025 COMPLETE CBC W/AUTO DIFF WBC: CPT | Performed by: STUDENT IN AN ORGANIZED HEALTH CARE EDUCATION/TRAINING PROGRAM

## 2019-07-11 DEVICE — GENERATOR DBS SYSTEM INFINITY 7 IPG 6662ANS
Type: IMPLANTABLE DEVICE | Site: CHEST  WALL | Status: NON-FUNCTIONAL
Removed: 2024-10-31

## 2019-07-11 DEVICE — LEAD EXTENSION W/EXTEND TECHNOLOGY 50CM 6371ANS: Type: IMPLANTABLE DEVICE | Site: NECK | Status: FUNCTIONAL

## 2019-07-11 RX ORDER — FENTANYL CITRATE 50 UG/ML
INJECTION, SOLUTION INTRAMUSCULAR; INTRAVENOUS PRN
Status: DISCONTINUED | OUTPATIENT
Start: 2019-07-11 | End: 2019-07-11

## 2019-07-11 RX ORDER — CLINDAMYCIN PHOSPHATE 900 MG/50ML
900 INJECTION, SOLUTION INTRAVENOUS EVERY 8 HOURS
Status: DISCONTINUED | OUTPATIENT
Start: 2019-07-11 | End: 2019-07-12 | Stop reason: HOSPADM

## 2019-07-11 RX ORDER — EPHEDRINE SULFATE 50 MG/ML
INJECTION, SOLUTION INTRAMUSCULAR; INTRAVENOUS; SUBCUTANEOUS PRN
Status: DISCONTINUED | OUTPATIENT
Start: 2019-07-11 | End: 2019-07-11

## 2019-07-11 RX ORDER — SODIUM CHLORIDE, SODIUM LACTATE, POTASSIUM CHLORIDE, CALCIUM CHLORIDE 600; 310; 30; 20 MG/100ML; MG/100ML; MG/100ML; MG/100ML
INJECTION, SOLUTION INTRAVENOUS CONTINUOUS PRN
Status: DISCONTINUED | OUTPATIENT
Start: 2019-07-11 | End: 2019-07-11

## 2019-07-11 RX ORDER — FENTANYL CITRATE 50 UG/ML
25-50 INJECTION, SOLUTION INTRAMUSCULAR; INTRAVENOUS
Status: DISCONTINUED | OUTPATIENT
Start: 2019-07-11 | End: 2019-07-11

## 2019-07-11 RX ORDER — PROPOFOL 10 MG/ML
INJECTION, EMULSION INTRAVENOUS PRN
Status: DISCONTINUED | OUTPATIENT
Start: 2019-07-11 | End: 2019-07-11

## 2019-07-11 RX ORDER — SODIUM CHLORIDE, SODIUM LACTATE, POTASSIUM CHLORIDE, CALCIUM CHLORIDE 600; 310; 30; 20 MG/100ML; MG/100ML; MG/100ML; MG/100ML
INJECTION, SOLUTION INTRAVENOUS CONTINUOUS
Status: DISCONTINUED | OUTPATIENT
Start: 2019-07-11 | End: 2019-07-11 | Stop reason: HOSPADM

## 2019-07-11 RX ORDER — DEXAMETHASONE SODIUM PHOSPHATE 10 MG/ML
INJECTION, SOLUTION INTRAMUSCULAR; INTRAVENOUS PRN
Status: DISCONTINUED | OUTPATIENT
Start: 2019-07-11 | End: 2019-07-11

## 2019-07-11 RX ORDER — OXYCODONE HCL 5 MG/5 ML
5 SOLUTION, ORAL ORAL EVERY 4 HOURS PRN
Status: CANCELLED | OUTPATIENT
Start: 2019-07-11

## 2019-07-11 RX ORDER — LIDOCAINE HYDROCHLORIDE 20 MG/ML
INJECTION, SOLUTION INFILTRATION; PERINEURAL PRN
Status: DISCONTINUED | OUTPATIENT
Start: 2019-07-11 | End: 2019-07-11

## 2019-07-11 RX ORDER — ONDANSETRON 2 MG/ML
4 INJECTION INTRAMUSCULAR; INTRAVENOUS EVERY 30 MIN PRN
Status: DISCONTINUED | OUTPATIENT
Start: 2019-07-11 | End: 2019-07-11 | Stop reason: HOSPADM

## 2019-07-11 RX ORDER — ACETAMINOPHEN 325 MG/1
650 TABLET ORAL ONCE
Status: DISCONTINUED | OUTPATIENT
Start: 2019-07-11 | End: 2019-07-11 | Stop reason: HOSPADM

## 2019-07-11 RX ORDER — NALOXONE HYDROCHLORIDE 0.4 MG/ML
.1-.4 INJECTION, SOLUTION INTRAMUSCULAR; INTRAVENOUS; SUBCUTANEOUS
Status: DISCONTINUED | OUTPATIENT
Start: 2019-07-11 | End: 2019-07-11 | Stop reason: HOSPADM

## 2019-07-11 RX ORDER — ONDANSETRON 4 MG/1
4 TABLET, ORALLY DISINTEGRATING ORAL EVERY 30 MIN PRN
Status: DISCONTINUED | OUTPATIENT
Start: 2019-07-11 | End: 2019-07-11 | Stop reason: HOSPADM

## 2019-07-11 RX ORDER — ONDANSETRON 2 MG/ML
INJECTION INTRAMUSCULAR; INTRAVENOUS PRN
Status: DISCONTINUED | OUTPATIENT
Start: 2019-07-11 | End: 2019-07-11

## 2019-07-11 RX ORDER — HYDROMORPHONE HYDROCHLORIDE 1 MG/ML
.3-.5 INJECTION, SOLUTION INTRAMUSCULAR; INTRAVENOUS; SUBCUTANEOUS EVERY 10 MIN PRN
Status: DISCONTINUED | OUTPATIENT
Start: 2019-07-11 | End: 2019-07-11 | Stop reason: HOSPADM

## 2019-07-11 RX ADMIN — CARBIDOPA AND LEVODOPA 1 TABLET: 25; 100 TABLET ORAL at 09:37

## 2019-07-11 RX ADMIN — ROCURONIUM BROMIDE 10 MG: 10 INJECTION INTRAVENOUS at 14:10

## 2019-07-11 RX ADMIN — LIDOCAINE HYDROCHLORIDE 100 MG: 20 INJECTION, SOLUTION INFILTRATION; PERINEURAL at 13:39

## 2019-07-11 RX ADMIN — Medication 5 MG: at 14:22

## 2019-07-11 RX ADMIN — PROPOFOL 20 MG: 10 INJECTION, EMULSION INTRAVENOUS at 13:40

## 2019-07-11 RX ADMIN — ROPINIROLE 2 MG: 2 TABLET, FILM COATED ORAL at 09:36

## 2019-07-11 RX ADMIN — PHENYLEPHRINE HYDROCHLORIDE 100 MCG: 10 INJECTION INTRAVENOUS at 14:20

## 2019-07-11 RX ADMIN — SODIUM CHLORIDE, POTASSIUM CHLORIDE, SODIUM LACTATE AND CALCIUM CHLORIDE: 600; 310; 30; 20 INJECTION, SOLUTION INTRAVENOUS at 13:26

## 2019-07-11 RX ADMIN — ACETAMINOPHEN 975 MG: 325 TABLET, FILM COATED ORAL at 17:47

## 2019-07-11 RX ADMIN — PROPOFOL 50 MG: 10 INJECTION, EMULSION INTRAVENOUS at 14:10

## 2019-07-11 RX ADMIN — PROPOFOL 80 MG: 10 INJECTION, EMULSION INTRAVENOUS at 13:39

## 2019-07-11 RX ADMIN — ROPINIROLE 2 MG: 2 TABLET, FILM COATED ORAL at 22:25

## 2019-07-11 RX ADMIN — PHENYLEPHRINE HYDROCHLORIDE 150 MCG: 10 INJECTION INTRAVENOUS at 14:17

## 2019-07-11 RX ADMIN — ROPINIROLE 2 MG: 2 TABLET, FILM COATED ORAL at 17:46

## 2019-07-11 RX ADMIN — LEVODOPA AND CARBIDOPA 2 CAPSULE: 145; 36.25 CAPSULE, EXTENDED RELEASE ORAL at 22:25

## 2019-07-11 RX ADMIN — SUGAMMADEX 200 MG: 100 INJECTION, SOLUTION INTRAVENOUS at 15:32

## 2019-07-11 RX ADMIN — CLINDAMYCIN PHOSPHATE 900 MG: 18 INJECTION, SOLUTION INTRAVENOUS at 13:47

## 2019-07-11 RX ADMIN — SODIUM CHLORIDE, POTASSIUM CHLORIDE, SODIUM LACTATE AND CALCIUM CHLORIDE: 600; 310; 30; 20 INJECTION, SOLUTION INTRAVENOUS at 15:45

## 2019-07-11 RX ADMIN — Medication 5 MG: at 14:35

## 2019-07-11 RX ADMIN — CLINDAMYCIN PHOSPHATE 900 MG: 900 INJECTION, SOLUTION INTRAVENOUS at 20:15

## 2019-07-11 RX ADMIN — FENTANYL CITRATE 50 MCG: 50 INJECTION, SOLUTION INTRAMUSCULAR; INTRAVENOUS at 15:11

## 2019-07-11 RX ADMIN — LEVODOPA AND CARBIDOPA 2 CAPSULE: 145; 36.25 CAPSULE, EXTENDED RELEASE ORAL at 20:15

## 2019-07-11 RX ADMIN — FENTANYL CITRATE 50 MCG: 50 INJECTION, SOLUTION INTRAMUSCULAR; INTRAVENOUS at 15:19

## 2019-07-11 RX ADMIN — ONDANSETRON 4 MG: 2 INJECTION INTRAMUSCULAR; INTRAVENOUS at 13:58

## 2019-07-11 RX ADMIN — FENTANYL CITRATE 100 MCG: 50 INJECTION, SOLUTION INTRAMUSCULAR; INTRAVENOUS at 13:38

## 2019-07-11 RX ADMIN — CARBIDOPA AND LEVODOPA 1 TABLET: 25; 100 TABLET ORAL at 22:25

## 2019-07-11 RX ADMIN — LEVODOPA AND CARBIDOPA 4 CAPSULE: 145; 36.25 CAPSULE, EXTENDED RELEASE ORAL at 09:37

## 2019-07-11 RX ADMIN — DEXAMETHASONE SODIUM PHOSPHATE 4 MG: 10 INJECTION, SOLUTION INTRAMUSCULAR; INTRAVENOUS at 13:58

## 2019-07-11 RX ADMIN — VITAMIN D, TAB 1000IU (100/BT) 2000 UNITS: 25 TAB at 09:37

## 2019-07-11 RX ADMIN — PHENYLEPHRINE HYDROCHLORIDE 150 MCG: 10 INJECTION INTRAVENOUS at 14:25

## 2019-07-11 RX ADMIN — FENTANYL CITRATE 50 MCG: 50 INJECTION, SOLUTION INTRAMUSCULAR; INTRAVENOUS at 14:11

## 2019-07-11 RX ADMIN — ROCURONIUM BROMIDE 10 MG: 10 INJECTION INTRAVENOUS at 14:21

## 2019-07-11 RX ADMIN — PHENYLEPHRINE HYDROCHLORIDE 150 MCG: 10 INJECTION INTRAVENOUS at 14:22

## 2019-07-11 RX ADMIN — AMANTADINE HYDROCHLORIDE 100 MG: 100 CAPSULE ORAL at 17:30

## 2019-07-11 RX ADMIN — ACETAMINOPHEN 975 MG: 325 TABLET, FILM COATED ORAL at 09:42

## 2019-07-11 RX ADMIN — LEVODOPA AND CARBIDOPA 4 CAPSULE: 145; 36.25 CAPSULE, EXTENDED RELEASE ORAL at 17:31

## 2019-07-11 RX ADMIN — PHENYLEPHRINE HYDROCHLORIDE 150 MCG: 10 INJECTION INTRAVENOUS at 14:05

## 2019-07-11 RX ADMIN — ROCURONIUM BROMIDE 10 MG: 10 INJECTION INTRAVENOUS at 14:26

## 2019-07-11 RX ADMIN — PHENYLEPHRINE HYDROCHLORIDE 150 MCG: 10 INJECTION INTRAVENOUS at 13:55

## 2019-07-11 RX ADMIN — ROCURONIUM BROMIDE 50 MG: 10 INJECTION INTRAVENOUS at 13:40

## 2019-07-11 RX ADMIN — PROPOFOL 20 MG: 10 INJECTION, EMULSION INTRAVENOUS at 15:31

## 2019-07-11 RX ADMIN — PHENYLEPHRINE HYDROCHLORIDE 50 MCG: 10 INJECTION INTRAVENOUS at 15:12

## 2019-07-11 RX ADMIN — ROCURONIUM BROMIDE 10 MG: 10 INJECTION INTRAVENOUS at 14:55

## 2019-07-11 ASSESSMENT — ACTIVITIES OF DAILY LIVING (ADL)
ADLS_ACUITY_SCORE: 16
ADLS_ACUITY_SCORE: 15
ADLS_ACUITY_SCORE: 16

## 2019-07-11 ASSESSMENT — LIFESTYLE VARIABLES: TOBACCO_USE: 0

## 2019-07-11 NOTE — ANESTHESIA PREPROCEDURE EVALUATION
Anesthesia Pre-Procedure Evaluation    Patient: Dion Villavicencio   MRN:     [id number] Gender:   male   Age:    58 year old :      1960        Preoperative Diagnosis: Parkinson's Disease, Von Willebrand's Disease   Procedure(s):  Stealth Assisted Bilateral Deep Brain Stimulator Placement, Phase I, Placement Of Bilateral Deep Brain Stimulator Electrode, Target Bilateral Subthalamic Nucleus With Microelectrode Recording     Past Medical History:   Diagnosis Date     Encounter for neuropsychological testing 2018    IMPRESSIONS AND RECOMMENDATIONS   Current results indicate impairments in learning, which are somewhat variable, and memory that falls well within normal limits. Language, visual processing, complex attentional processing, and executive functioning fall within normal limits, generally in the above average range. He denied experiencing significant depressive symptomatology, apathy, or anxiety.   Th     Esophageal reflux 2018     H/O magnetic resonance imaging of brain and brain stem 2018    MR BRAIN W/O & W CONTRAST 2018 2:30 PM  History: ; Parkinson's disease (H) ICD-10: Parkinson's disease (H)  Comparison:  3/14/2006    Technique: Midline sagittal T1-weighted images and axial diffusion with images through the whole brain were obtained. Axial and coronal T2-weighted images, and inversion recovery images were obtained with focus on the evans through the corpus callosum, obtained      Hx of von Willebrand's disease 2018     Parkinson disease (H) 2018      Past Surgical History:   Procedure Laterality Date     C ORAL SURGERY PROCEDURE  1986    wisdom teeth surgery     OPTICAL TRACKING SYSTEM INSERTION DEEP BRAIN STIMULATION BILATERAL Bilateral 2019    Procedure: Stealth Assisted Bilateral Deep Brain Stimulator Placement, Phase I, Placement Of Bilateral Deep Brain Stimulator Electrode, Target Bilateral Subthalamic Nucleus With Microelectrode Recording;  Surgeon: Sidney Cardozo  MD [place];  Location: [facility] OR          Anesthesia Evaluation     . Pt has had prior anesthetic. Type: General    No history of anesthetic complications          ROS/MED HX    ENT/Pulmonary:     (+)sleep apnea, uses CPAP , . .   (-) tobacco use   Neurologic:     (+)Parkinson's disease     Cardiovascular: Comment: Denies any cardiac history.  Denies chest pain, SOB, palpitations, syncope, SANTILLAN, orthopnea, or PND.      (+) ----. : . . . :. . No previous cardiac testing       METS/Exercise Tolerance: Comment: 7,000-10,000 steps daily.  >4 METS   Hematologic: Comments: von Willebrand's disease - remote h/o epistaxis when he was a kid.  Both his dad and son have von Willebrand's disease.    (+) History of Transfusion (Blood product as it relates to von Willibrand's disease. ) no previous transfusion reaction -      Musculoskeletal:  - neg musculoskeletal ROS       GI/Hepatic: Comment: GERD symptoms resolved years ago.        (-) GERD   Renal/Genitourinary:     (+) Nephrolithiasis ,       Endo:     (+) Obesity (BMI>30), .      Psychiatric:  - neg psychiatric ROS       Infectious Disease:  - neg infectious disease ROS       Malignancy:      - no malignancy   Other:    (+) No chance of pregnancy C-spine cleared: Yes, no H/O Chronic Pain,no other significant disability                        PHYSICAL EXAM:   Mental Status/Neuro: A/A/O   Airway: Facies: Feasible  Mallampati: Not Assessed  Mouth/Opening: Not Assessed  TM distance: Not Assessed  Neck ROM: Not Assessed   Respiratory: Auscultation: CTAB     Resp. Rate: Normal     Resp. Effort: Normal      CV: Rhythm: Regular  Rate: Age appropriate  Heart: Normal Sounds   Comments:      Dental: Normal                  Lab Results   Component Value Date    WBC 12.8 (H) 07/11/2019    HGB 16.4 07/11/2019    HCT 52.0 07/11/2019     07/11/2019     07/11/2019    POTASSIUM 3.8 07/11/2019    CHLORIDE 110 (H) 07/11/2019    CO2 24 07/11/2019    BUN 14 07/11/2019    CR 0.80  "07/11/2019     (H) 07/11/2019    ELYSIA 8.8 07/11/2019    PHOS 4.7 (H) 07/10/2019    MAG 2.3 07/10/2019    PTT 35 06/24/2019    INR 1.03 06/24/2019       Preop Vitals  BP Readings from Last 3 Encounters:   07/11/19 126/86   06/24/19 124/85   01/07/19 (!) 136/94    Pulse Readings from Last 3 Encounters:   07/11/19 89   06/24/19 88   01/07/19 100      Resp Readings from Last 3 Encounters:   07/11/19 16   06/24/19 18   01/07/19 18    SpO2 Readings from Last 3 Encounters:   07/11/19 92%   06/24/19 96%   01/07/19 94%      Temp Readings from Last 1 Encounters:   07/11/19 36.7  C (98  F) (Oral)    Ht Readings from Last 1 Encounters:   06/24/19 1.732 m (5' 8.2\")      Wt Readings from Last 1 Encounters:   06/24/19 102.4 kg (225 lb 12.8 oz)    Estimated body mass index is 34.13 kg/m  as calculated from the following:    Height as of 6/24/19: 1.732 m (5' 8.2\").    Weight as of 6/24/19: 102.4 kg (225 lb 12.8 oz).     LDA:  Peripheral IV 07/08/19 Left;Anterior Lower forearm (Active)   Site Assessment WDL 7/11/2019 11:00 AM   Line Status Saline locked 7/11/2019 11:00 AM   Phlebitis Scale 0-->no symptoms 7/11/2019 11:00 AM   Infiltration Scale 0 7/11/2019 11:00 AM   Infiltration Site Treatment Method  None 7/10/2019  5:00 PM   Extravasation? No 7/10/2019  5:00 PM   Number of days: 3       Peripheral IV Anterior;Right Hand (Active)   Number of days:        ETT (adult) (Active)   Number of days: 0            Assessment:   ASA SCORE: 3    NPO Status: > 6 hours since completed Solid [employer]   Documentation: H&P complete; Preop Testing complete; Consents complete   Proceeding: Proceed without further delay  Tobacco Use:  NO Active use of Tobacco/UNKNOWN Tobacco use status     Plan:   Anes. Type:  General   Pre-Induction: Midazolam IV; Acetaminophen PO   Induction:  IV (Standard)   Airway: Oral ETT   Access/Monitoring: PIV   Maintenance: Balanced   Emergence: Procedure Site   Logistics: Same Day Surgery     Postop Pain/Sedation " "Strategy:  Standard-Options: Opioids PRN     PONV Management:  Adult Risk Factors:, Non-Smoker, Postop Opioids  Prevention: Ondansetron     CONSENT: Direct conversation   Plan and risks discussed with: Patient   Blood Products: Consent Deferred (Minimal Blood Loss)       Comments for Plan/Consent:  58M with Parkinson's disease here for phase II of DBS placement.  Tolerated prior anesthetic well.  ASA 3  Plan: GETA                    PAC Discussion and Assessment    ASA Classification: 3  Case is suitable for: [place]  Anesthetic techniques and relevant risks discussed: GA  Invasive monitoring and risk discussed:   Types:   Possibility and Risk of blood transfusion discussed:   NPO instructions given:   Additional anesthetic preparation and risks discussed:   Needs early admission to pre-op area:   Other:     PAC Resident/NP Anesthesia Assessment:  Dion Villavicencio is a 58-year-old male scheduled for Stealth Assisted Bilateral Deep Brain Stimulator Placement, Phase I, Placement Of Bilateral Deep Brain Stimulator Electrode, Target Bilateral Subthalamic Nucleus With Microelectrode Recording on 7/9/2019 with Dr. Cardozo at the [facility] under monitored anesthesia care and local.  Mr. Villavicencio has a history of Parkinson's disease that was diagnosed in 2006 and Type I Von Willebrand Disease.  He has noticed worsening tremors that have been refractory to medical therapy.  He followed up with Dr. Cardozo on 1/7/19 for ongong DBS placement evaluation.  Per Dr. Cardozo's note, Mr. Villavicencio's  case was discussed at the Movement Disorder Consensus Group Meeting and he was considered a candidate in terms of DBS, bilateral candidate using Abbott system.  He was seen in consultation with hematology on 9/6/18 and per their note,\" Majority of today's visit was spent counseling the patient regarding treatment of vWD around major surgical procedures.  I (Dr. Ma) did explain that even with achieving our goal factor levels he is still at risk for bleeding " "as all patients to undergo this procedure are\".  Through the evaluations with bot neurosurgery and hematology, the above procedure was recommended.         He has the following specific operative considerations:   - METS:  >4. RCRI : No serious cardiac risks.  0.4 % risk of major adverse cardiac event.  No further cardiac evaluation needed per 2014 ACC/AHA guidelines for non-cardiac surgery.   - Treated  OCTAVIO :  Instructed to bring CPAP with DOS.  - VTE risk:  0.5%.  Von Willebrands disease.   - Risk of PONV score = 1-2.  If > 2, anti-emetic intervention recommended.      #  Cardiology - denies cardiac history or symptoms.     #  Pulmonary - denies smoking hx  #  Hematology    -   Von Willebrands's disease: Per Dr. Ma's note from 9/6/2018 regarding instructions for surgery:   \"Assessment:  In summary, Dion Villavicencio is a 57-year-old man with refractory Parkinson's Disease and Type 1 vWD with mild bleeding history.  Despite this history, I do think that he is at high risk for bleeding with placement of a DBS device without factor replacement.  Explained that the duration the factor replacement for this, surgery is not known, but my estimation is that one week of therapy is likely sufficient.  In reviewing his von Willebrand panel he is very unlikely to achieve normal von Willebrand factor levels with use of estimate and certainly the duration of elevated levels would not be adequate with this therapy.  Therefore, I recommended use of factor replacement therapy and I think of the choices available Humate-P is a reasonable option.  I explained that her goal is to increase the levels for adequate hemostasis without creating a greatly elevated factor VIII which would put him at risk for thrombotic complications from the surgery.     Given this 2-stage procedure with a brief hospital stay, I recommended that he receive a dose of Humate-P based on his baseline factor VIII and von Willebrand factor.  This would occur about " "1 hour prior to the surgery and then we would follow his von Willebrand factor activity in the hospital.  This would help us to understand the kinetics of factor given that this is his first exposure.  We would then Humate-P replacement to maintain a goal von Willebrand level of 100% over the course of the next week as an outpatient.  This will require our working with home infusion service to provide his factor and placement of an indwelling catheter while as an inpatient for him to maintain at home.     Regarding the second stage of the procedure which entails placement of a small device and of the skin to service battery source for the device, I would estimate only a couple days of normal von Willebrand factor levels would be necessary.  We could also supplement this plan with the use of Amicar if required.     Plan:  1. Majority of today's visit was spent counseling the patient regarding treatment of vWD around major surgical procedures.  I did explain that even with achieving our goal factor levels he is still at risk for bleeding as all patients to undergo this procedure are.  2. Humate-P 60 RCo Units/kg x 1 prior to surgery  3. Check vWF Panel (vWF antigen, activity and F8) 1 hour post surgery and at AM draw on post op day 1.  This will inform his kinetics and help with planning his further Humate doses to keep vWF activity ~100.  He will almost certainly require a second dose of Humate P prior to discharge on post op day 1.  4. We will help arrange for home infusion and for the schedule for factor replacement based on #3.  5. We will make a separate plan for placement of the battery pack device based on experience with his first procedure.     The patient is given our center's contact information and is instructed to call if he should have any further questions or concerns.  Otherwise, we will plan on seeing him back annually and as needed for surgical planning\".    #  Neuro    - Parkinson's disease with " "progressing tremors that have been refractory to medical therapy.  Above procedure planned.    Per Dr. Cardozo's note 1/7/19, \"Considering all the above, the following is tentatively planned.     1.  Patient will be admitted day before the surgery, likely Monday, in preparation for Humate-P 60 RCo Units/Kg prior to surgery.  2.  Plan for bilateral deep brain stimulator placement, phase I, placement of bilateral deep brain stimulator electrodes, target bilateral subthalamic nucleus, with microelectrode recording, on Tuesday.  The surgery will likely be about 8 hours long.  3.  Check vWF Panel (vWF antigen, activity and F8) 1 hour post surgery and at AM draw on post op day 1, Wednesday.  4.  He will need second dose of Humate P on post op day 1, Wednesday.  5.  Another Humate P dose on post op day 2, Thursday and discharge patient to outpatient.   6.  Patient will go to  as an outpatient and undergo phase II generator/battery placement.  7.  Go home for home infusion.     Alternate plan is, if we cannot discharge him to outpatient is this.  1.  Patient will be admitted day before the surgery, likely Monday, in preparation for Humate-P 60 RCo Units/Kg prior to surgery.  2.  Plan for bilateral deep brain stimulator placement, phase I, placement of bilateral deep brain stimulator electrodes, target bilateral subthalamic nucleus, with microelectrode recording, on Tuesday.  The surgery will likely be about 8 hours long.  3.  Check vWF Panel (vWF antigen, activity and F8) 1 hour post surgery and at AM draw on post op day 1, Wednesday.  4.  He will need second dose of Humate P on post op day 1, Wednesday.  5.  Another Humate P dose on post op day 2, Thursday and take him to surgery for phase II surgery as an inpatient.   6.  Check vWF Panel (vWF antigen, activity and F8) 1 hour post surgery and at AM draw on post op day 1, Friday.  7.  He will need another dose of Humate P on post op day 1, Friday, before the final " "discharge.  8.  Go home for home infusion.     Backup plan, if we are not able to complete bilateral implantation at step 2 on Tuesday is the following.  1.  Patient will be admitted day before the surgery, likely Monday, in preparation for Humate-P 60 RCo Units/Kg prior to surgery.  2.  Plan for bilateral deep brain stimulator placement, phase I, placement of bilateral deep brain stimulator electrodes, target bilateral subthalamic nucleus, with microelectrode recording, on Tuesday.  The surgery will likely be about 8 hours long.  If we cannot complete bilateral implantation for whatever reason, we will just do one side, left side.  3.  Check vWF Panel (vWF antigen, activity and F8) 1 hour post surgery and at AM draw on post op day 1, Wednesday.  4.  He will need second dose of Humate P on post op day 1, Wednesday.  5.  Another Humate P dose on post op day 2, Thursday and take him to surgery for phase II surgery as an inpatient.   6.  Check vWF Panel (vWF antigen, activity and F8) 1 hour post surgery and at AM draw on post op day 1, Friday.  7.  He will need another dose of Humate P on post op day 1, Friday.  8.  Another Humate P dose on post op day 2, Saturday, 1 hour prior to his second side phase I surgery, right side surgery.  9.  Check vWF Panel (vWF antigen, activity and F8) 1 hour post surgery and at AM draw on post op day 1, Sunday.  10.  He will need second dose of Humate P on post op day 1, Sunday, before his final discharge.  11.  Go home for home infusion\".      - Anesthesia considerations:  Refer to PAC assessment in anesthesia records  - CBC, BMP and T&S today      Arrival time, NPO, shower and medication instructions provided by nursing staff today.  Preparing For Your Surgery handout given.  Patient was discussed with Dr Lr.      Hematology          Reviewed and Signed by PAC Mid-Level Provider/Resident  Mid-Level Provider/Resident: Apolonia GARCIAS CNP  Date: 6/24/19  Time: " 10:38    Attending Anesthesiologist Anesthesia Assessment:  STAFF:  58 y.o. man with vWillebrand Type I disease for DBS  by Dr. Cardozo using MAC anesthesia.   History summarized above.  Patient has severe Type I vWillebrand Disease with 15% activity.   Will need  will need dose of Humate-P based on baseline factor VIII and vWillebrand factor baseline levels, generally in the range of 60 RCo Units/kg (about 6180 RCo Units) +/- 10% IV bolus at 4 mL/min just 60 minutes prior to his scheduled procedure. May also consider AMICAR, as guided by Hematology.   Instructions given and questions answered.   Final plans by anesthesiology team on DOS.   ---rcp      Reviewed and Signed by PAC Anesthesiologist  Anesthesiologist: funmi  Date: 6/24/2019  Time:   Pass/Fail: Pass  Disposition:     PAC Pharmacist Assessment:        Pharmacist:   Date:   Time:        Glenna Unger MD

## 2019-07-11 NOTE — PROGRESS NOTES
Hematology / Oncology  Daily Progress Note   Date of Service: 07/11/2019  Patient: Dion Villavicencio  MRN: [id number]  Admission Date: 7/8/2019  Hospital Day # 3    Assessment & Plan:   Dion Villavicencio is a 58 year old male Dion Villavicencio is a 58 year old man with history of von Willebrand's disease (type I) from Parkinson's disease who was admitted for deep brain stimulator implantation.    # VWF (type I)  # Parkinson's disease with plan for repeat OR 7/11/2019  Patient's factor VIII assay today is 201%, VWF antigen is 155%, VWF activity is 106%.  This patient does not need further factor replacement and is okay to proceed to surgery this afternoon.  We would like to recheck factor levels tomorrow a.m. (I will place orders for you).  I discussed this with the patient and his family this morning and patient notified the patient to proceed to the OR.  We will continue to follow.    Patient was discussed with attending physician Dr. Izaguirre.    Del Mendes MD  Heme/Onc Fellow  ___________________________________________________________________    Subjective & Interval History:    No acute events noted overnight.  This a.m. patient reports he is feeling well overall and is looking forward to surgery.  He has no new concerns time and has not noted any bleeding.  We reviewed his factor results.    Physical Exam:    Blood pressure 126/86, pulse 89, temperature 98  F (36.7  C), temperature source Oral, resp. rate 16, SpO2 92 %.    General: alert and cooperative, sitting up in bed, no acute distress  HEENT: sclera anicteric, EOMI, MMM, oropharynx unremarkable  CV: RRR, normal S1/S2, no m/r/g  Resp: CTAB, no wheezing/crackles, normal respiratory effort on ambient air  MSK: warm and well-perfused, no edema  Skin: incision sites on head c/d/i without bleeding/oozing  Neuro: AOx3, moves all extremities equally, no focal deficits.  Tremor noted    Labs & Studies: I personally reviewed the following studies:  ROUTINE LABS (Last  four results):  CMP  Recent Labs   Lab 07/11/19  0338 07/10/19  0311 07/09/19  0610    141 138   POTASSIUM 3.8 3.9 3.8   CHLORIDE 110* 110* 107   CO2 24 23 25   ANIONGAP 6 8 7   * 140* 114*   BUN 14 16 21   CR 0.80 0.89 0.88   GFRESTIMATED >90 >90 >90   GFRESTBLACK >90 >90 >90   ELYSIA 8.8 8.4* 8.6   MAG  --  2.3  --    PHOS  --  4.7*  --      CBC  Recent Labs   Lab 07/11/19 0338 07/10/19  0311 07/09/19  0610   WBC 12.8* 10.5 8.5   RBC 5.41 5.41 5.88   HGB 16.4 16.4 17.9*   HCT 52.0 51.3 56.7*   MCV 96 95 96   MCH 30.3 30.3 30.4   MCHC 31.5 32.0 31.6   RDW 12.9 12.8 12.4    279 283     INRNo lab results found in last 7 days.    Medications list for reference:  Current Facility-Administered Medications   Medication     acetaminophen (TYLENOL) Suppository 650 mg     [START ON 7/12/2019] acetaminophen (TYLENOL) tablet 650 mg     acetaminophen (TYLENOL) tablet 975 mg     amantadine (SYMMETREL) capsule 100 mg     carbidopa-levodopa (SINEMET)  MG per tablet 1 tablet     carbidopa-levodopa (SINEMET)  MG per tablet 1 tablet     carbidopa-levodopa ER (RYTARY) 36. MG per CR capsule 2 capsule     carbidopa-levodopa ER (RYTARY) 36. MG per CR capsule 4 capsule     clindamycin (CLEOCIN) infusion 900 mg     clindamycin (CLEOCIN) infusion 900 mg     docusate sodium (COLACE) capsule 100 mg     hydrALAZINE (APRESOLINE) injection 10-20 mg     HYDROmorphone (PF) (DILAUDID) injection 0.3-0.5 mg     labetalol (NORMODYNE/TRANDATE) syringe 10-40 mg     lidocaine (LMX4) cream     lidocaine 1 % 0.1-1 mL     magnesium sulfate 2 g in NS intermittent infusion ([facility] or [facility] Cmpd)     magnesium sulfate 4 g in 100 mL sterile water (premade)     naloxone (NARCAN) injection 0.1-0.4 mg     ondansetron (ZOFRAN-ODT) ODT tab 4 mg    Or     ondansetron (ZOFRAN) injection 4 mg     oxyCODONE (ROXICODONE) tablet 5-10 mg     potassium chloride (KLOR-CON) Packet 20-40 mEq     potassium chloride 10 mEq in 100 mL  intermittent infusion with 10 mg lidocaine     potassium chloride 10 mEq in 100 mL sterile water intermittent infusion (premix)     potassium chloride 20 mEq in 50 mL intermittent infusion     potassium chloride ER (K-DUR/KLOR-CON M) CR tablet 20-40 mEq     potassium phosphate 15 mmol in D5W 250 mL intermittent infusion     potassium phosphate 20 mmol in D5W 250 mL intermittent infusion     potassium phosphate 20 mmol in D5W 500 mL intermittent infusion     potassium phosphate 25 mmol in D5W 500 mL intermittent infusion     prochlorperazine (COMPAZINE) injection 10 mg    Or     prochlorperazine (COMPAZINE) tablet 10 mg     rOPINIRole (REQUIP) tablet 2 mg     senna-docusate (SENOKOT-S/PERICOLACE) 8.6-50 MG per tablet 1 tablet    Or     senna-docusate (SENOKOT-S/PERICOLACE) 8.6-50 MG per tablet 2 tablet     sodium chloride (PF) 0.9% PF flush 3 mL     sodium chloride (PF) 0.9% PF flush 3 mL     vitamin D3 (CHOLECALCIFEROL) 1000 units (25 mcg) tablet 2,000 Units

## 2019-07-11 NOTE — PLAN OF CARE
Status: POD #1 DBS  Vitals: Intermittently tachy, other VSS   Neuros: A/O x4. Lethargic throughout day. Right pupil>left. 5/5 extremities.   IV: PIV SL  Resp/trach: CPAP at night   Diet: Regular, NPO at midnight   Bowel status: No BM this shift  : Voiding spontaneously   Skin: Bilateral scalp incisions, DARION. Covering with primapore during the night for CPAP strap, per MD   Pain: No c/o pain   Activity: Up SBA, GB   Plan: Surgery planned for tomorrow. Continue to follow POC.

## 2019-07-11 NOTE — PLAN OF CARE
Status: POD #2 DBS  Vitals: Intermittently tachy (102), and Tmax 100. Other VSS  Neuros: A/O x4. Lethargic throughout day. Right pupil>left. 5/5 extremities.   IV: PIV SL  Resp/trach: CPAP at night   Diet: Regular, NPO at midnight for OR today  Bowel status: No BM this shift  : Voiding spontaneously   Skin: Bilateral scalp incisions, DARION. Covering with primapore during the night for CPAP strap, per MD   Pain: No c/o pain   Activity: Up SBA, GB   Plan: Surgery planned for today at 1300. Continue to follow POC.   Will need pre-surgical scrub shower this am.

## 2019-07-11 NOTE — PLAN OF CARE
Pt here for DBS for parkinson's. Phase 1 completed on 7/9. Bilateral head incisions DARION with sutures, no drainage, redness, or swelling. Phase 2 for DBS to be completed today, was taken down to OR around 1210. AVSS. A&Ox4. Neuros intact besides right pupil greater than left, generalized tremors, and slightly lethargic - arouses easily to voice. Scheduled tylenol for slight HA pain. IV SL. NPO. Voiding spon. BM this AM. Pre op shower completed. Report given to 3C RN. Family with pt during transfer, will come back to 6A after procedure. Continue with POC.

## 2019-07-11 NOTE — ANESTHESIA POSTPROCEDURE EVALUATION
Anesthesia POST Procedure Evaluation    Patient: Dion Villavicencio   MRN:     [id number] Gender:   male   Age:    58 year old :      1960        Preoperative Diagnosis: Parkinson's Disease, Von Willebrand's Disease   Procedure(s):  Deep Brain Stimulator Placement, Phase II, Placement Of Deep Brain Stimulator Generator/Battery Over The Left Chest Wall   Postop Comments: No value filed.       Anesthesia Type:  General  No value filed.    Reportable Event: NO     PAIN: Uncomplicated   Sign Out status: Comfortable, Well controlled pain     PONV: No PONV   Sign Out status:  No Nausea or Vomiting     Neuro/Psych: Uneventful perioperative course   Sign Out Status: Preoperative baseline; Age appropriate mentation     Airway/Resp.: Uneventful perioperative course   Sign Out Status: Non labored breathing, age appropriate RR; Resp. Status within EXPECTED Parameters     CV: Uneventful perioperative course   Sign Out status: Appropriate BP and perfusion indices; Appropriate HR/Rhythm     Disposition:   Sign Out in:  PACU  Disposition:  Floor  Recovery Course: Uneventful  Follow-Up: Not required           Last Anesthesia Record Vitals:  CRNA VITALS  2019 1511 - 2019 1611      2019             EKG:  Sinus rhythm          Last PACU Vitals:  Vitals Value Taken Time   /78 2019  4:30 PM   Temp 36.3  C (97.4  F) 2019  4:30 PM   Pulse 92 2019  4:30 PM   Resp 18 2019  4:45 PM   SpO2 96 % 2019  4:45 PM   Temp src     NIBP 108/79 2019  3:44 PM   Pulse     SpO2 97 % 2019  3:44 PM   Resp     Temp     Ht Rate 97 2019  3:44 PM   Temp 2     Vitals shown include unvalidated device data.      Electronically Signed By: Lior Mcdonald MD, 2019, 6:30 PM

## 2019-07-11 NOTE — PROGRESS NOTES
Neurosurgery Daily Progress Note:     S: Eager for surgery so he can return home.    O:  Exam:  General: Awake;  Alert, In No Acute Distress  Pulm: Breathing Comfortably   Mental status: Oriented x 3  HEENT: some left facial swelling and periorbital swelling present   Cranial Nerves: able to open and close eyes symmetrically. Tongue protrusion, shoulder shrug, and smile symmetric. No dysarthria. Extraocular muscles intact. Pupils reactive bilaterally   Strength:      Del Tr Bi WE WF Gr  R 5 5 5 5 5 5  L 5 5 5 5 5 5     HF KE KF DF PF   R 5 5 5 5 5   L 5 5 5 5 5   Pronator Drift: Absent  Sensory: Intact to Light Touch  INCISION: bilateral frontal scalp incisions are clean/dry/intact with cyanoacrylate present     Assessment:   57 y/o M w/ hx of Parkinson s and von Willebrand s disease s/p DBS phase I on 7/9. Doing well. Plan for DBS phase 2 7/11.    Plan:   Neuro:   Sutures: Absorbable  Home amantadine, sinemet, rytary, ropinirole     Cardiovascular: blood pressure goals: SBP < 140    Pulmonary: Incentive spirometry     Gastrointestinal: regular diet; NPO at midnight 7/11    Renal: discontinue adams; monitor I/O    Heme: von willebrand panel post-op. Factor replacement per hematology.     Endocrine: No issues    Infectious clindamycin for ajay-op prophylaxis     DVT prophylaxis: Sequential compression devices    DISPO: OR; Anticipate discharge Home Post-Operatively     Barriers: phase 2 surgery     Janene Grigsby, SOTERO-BC, CCRN, CNRN  Department of Neurosurgery  Pager: [phone]

## 2019-07-11 NOTE — BRIEF OP NOTE
[facility], [place]    Brief Operative Note    Pre-operative diagnosis: Parkinson's Disease, Von Willebrand's Disease  Post-operative diagnosis * No post-op diagnosis entered *  Procedure: Procedure(s):  Deep Brain Stimulator Placement, Phase II, Placement Of Deep Brain Stimulator Generator/Battery Over The Left Chest Wall  Surgeon: Surgeon(s) and Role:     * Sidney Cardozo MD - Primary     * Lawrence Randle MD - Resident - Assisting  Anesthesia: General   Estimated blood loss: 5 mL  Drains: None  Specimens: * No specimens in log *  Findings:   None.  Complications: None.  Implants:    Implant Name Type Inv. Item Serial No.  Lot No. LRB No. Used   LEAD EXTENSION KIT 60CM DBS [id number] Leads LEAD EXTENSION KIT 60CM DBS 02023-35 [id number] [employer]-NEURO  Left 1   LEAD EXTENSION W/EXTEND TECHNOLOGY 50CM 6371ANS Leads LEAD EXTENSION W/EXTEND TECHNOLOGY 50CM 6371ANS [id number] [employer]  Left 1   LEAD EXTENSION W/EXTEND TECHNOLOGY 50CM 6371ANS Leads LEAD EXTENSION W/EXTEND TECHNOLOGY 50CM 6371ANS [id number] C[employer]  Left 1

## 2019-07-11 NOTE — ANESTHESIA CARE TRANSFER NOTE
Patient: Dion Villavicencio    Procedure(s):  Deep Brain Stimulator Placement, Phase II, Placement Of Deep Brain Stimulator Generator/Battery Over The Left Chest Wall    Diagnosis: Parkinson's Disease, Von Willebrand's Disease  Diagnosis Additional Information: No value filed.    Anesthesia Type:   No value filed.     Note:  Airway :Face Mask  Patient transferred to:PACU  Comments: Pt alert, breathing spontaneously on 6L O2 via FM. VSS. Report shared with RN.Handoff Report: Identifed the Patient, Identified the Reponsible Provider, Reviewed the pertinent medical history, Discussed the surgical course, Reviewed Intra-OP anesthesia mangement and issues during anesthesia, Set expectations for post-procedure period and Allowed opportunity for questions and acknowledgement of understanding      Vitals: (Last set prior to Anesthesia Care Transfer)    CRNA VITALS  7/11/2019 1511 - 7/11/2019 1546      7/11/2019             NIBP:  108/79    NIBP Mean:  91    Ht Rate:  97    SpO2:  97 %                Electronically Signed By: DIETER Weber CRNA  July 11, 2019  3:46 PM

## 2019-07-12 ENCOUNTER — TELEPHONE (OUTPATIENT)
Dept: NEUROSURGERY | Facility: CLINIC | Age: 59
End: 2019-07-12

## 2019-07-12 VITALS
HEART RATE: 89 BPM | OXYGEN SATURATION: 95 % | TEMPERATURE: 96.4 F | RESPIRATION RATE: 16 BRPM | DIASTOLIC BLOOD PRESSURE: 89 MMHG | SYSTOLIC BLOOD PRESSURE: 141 MMHG

## 2019-07-12 DIAGNOSIS — D68.00 VON WILLEBRAND'S DISEASE (H): Primary | ICD-10-CM

## 2019-07-12 LAB
ANION GAP SERPL CALCULATED.3IONS-SCNC: 6 MMOL/L (ref 3–14)
BASOPHILS # BLD AUTO: 0 10E9/L (ref 0–0.2)
BASOPHILS NFR BLD AUTO: 0.2 %
BUN SERPL-MCNC: 21 MG/DL (ref 7–30)
CALCIUM SERPL-MCNC: 8.2 MG/DL (ref 8.5–10.1)
CHLORIDE SERPL-SCNC: 111 MMOL/L (ref 94–109)
CO2 SERPL-SCNC: 22 MMOL/L (ref 20–32)
CREAT SERPL-MCNC: 0.83 MG/DL (ref 0.66–1.25)
DIFFERENTIAL METHOD BLD: ABNORMAL
EOSINOPHIL # BLD AUTO: 0 10E9/L (ref 0–0.7)
EOSINOPHIL NFR BLD AUTO: 0.3 %
ERYTHROCYTE [DISTWIDTH] IN BLOOD BY AUTOMATED COUNT: 12.7 % (ref 10–15)
FACT VIII ACT/NOR PPP: 175 % (ref 55–200)
GFR SERPL CREATININE-BSD FRML MDRD: >90 ML/MIN/{1.73_M2}
GLUCOSE SERPL-MCNC: 159 MG/DL (ref 70–99)
HCT VFR BLD AUTO: 47.9 % (ref 40–53)
HGB BLD-MCNC: 15.1 G/DL (ref 13.3–17.7)
IMM GRANULOCYTES # BLD: 0 10E9/L (ref 0–0.4)
IMM GRANULOCYTES NFR BLD: 0.4 %
LYMPHOCYTES # BLD AUTO: 0.9 10E9/L (ref 0.8–5.3)
LYMPHOCYTES NFR BLD AUTO: 8.6 %
MCH RBC QN AUTO: 30.1 PG (ref 26.5–33)
MCHC RBC AUTO-ENTMCNC: 31.5 G/DL (ref 31.5–36.5)
MCV RBC AUTO: 96 FL (ref 78–100)
MONOCYTES # BLD AUTO: 0.8 10E9/L (ref 0–1.3)
MONOCYTES NFR BLD AUTO: 7.6 %
NEUTROPHILS # BLD AUTO: 8.6 10E9/L (ref 1.6–8.3)
NEUTROPHILS NFR BLD AUTO: 82.9 %
NRBC # BLD AUTO: 0 10*3/UL
NRBC BLD AUTO-RTO: 0 /100
PLATELET # BLD AUTO: 238 10E9/L (ref 150–450)
POTASSIUM SERPL-SCNC: 3.9 MMOL/L (ref 3.4–5.3)
RBC # BLD AUTO: 5.01 10E12/L (ref 4.4–5.9)
SODIUM SERPL-SCNC: 139 MMOL/L (ref 133–144)
VWF CBA/VWF AG PPP IA-RTO: 132 % (ref 50–200)
VWF:AC ACT/NOR PPP IA: 73 % (ref 50–180)
WBC # BLD AUTO: 10.4 10E9/L (ref 4–11)

## 2019-07-12 PROCEDURE — 85246 CLOT FACTOR VIII VW ANTIGEN: CPT | Performed by: STUDENT IN AN ORGANIZED HEALTH CARE EDUCATION/TRAINING PROGRAM

## 2019-07-12 PROCEDURE — 85245 CLOT FACTOR VIII VW RISTOCTN: CPT | Performed by: STUDENT IN AN ORGANIZED HEALTH CARE EDUCATION/TRAINING PROGRAM

## 2019-07-12 PROCEDURE — 00000328 ZZHCL STATISTIC PTT NC: Performed by: STUDENT IN AN ORGANIZED HEALTH CARE EDUCATION/TRAINING PROGRAM

## 2019-07-12 PROCEDURE — 25000132 ZZH RX MED GY IP 250 OP 250 PS 637: Performed by: STUDENT IN AN ORGANIZED HEALTH CARE EDUCATION/TRAINING PROGRAM

## 2019-07-12 PROCEDURE — 00000167 ZZHCL STATISTIC INR NC: Performed by: STUDENT IN AN ORGANIZED HEALTH CARE EDUCATION/TRAINING PROGRAM

## 2019-07-12 PROCEDURE — 00000401 ZZHCL STATISTIC THROMBIN TIME NC: Performed by: STUDENT IN AN ORGANIZED HEALTH CARE EDUCATION/TRAINING PROGRAM

## 2019-07-12 PROCEDURE — 25000555 ZZHC RX FACTOR IP 250 OP 636: Performed by: INTERNAL MEDICINE

## 2019-07-12 PROCEDURE — 25000125 ZZHC RX 250: Performed by: STUDENT IN AN ORGANIZED HEALTH CARE EDUCATION/TRAINING PROGRAM

## 2019-07-12 PROCEDURE — 85240 CLOT FACTOR VIII AHG 1 STAGE: CPT | Performed by: STUDENT IN AN ORGANIZED HEALTH CARE EDUCATION/TRAINING PROGRAM

## 2019-07-12 PROCEDURE — 80048 BASIC METABOLIC PNL TOTAL CA: CPT | Performed by: STUDENT IN AN ORGANIZED HEALTH CARE EDUCATION/TRAINING PROGRAM

## 2019-07-12 PROCEDURE — 36415 COLL VENOUS BLD VENIPUNCTURE: CPT | Performed by: STUDENT IN AN ORGANIZED HEALTH CARE EDUCATION/TRAINING PROGRAM

## 2019-07-12 PROCEDURE — 85025 COMPLETE CBC W/AUTO DIFF WBC: CPT | Performed by: STUDENT IN AN ORGANIZED HEALTH CARE EDUCATION/TRAINING PROGRAM

## 2019-07-12 RX ORDER — CALCIUM CARBONATE 500 MG/1
500 TABLET, CHEWABLE ORAL DAILY PRN
Status: DISCONTINUED | OUTPATIENT
Start: 2019-07-12 | End: 2019-07-12 | Stop reason: HOSPADM

## 2019-07-12 RX ORDER — OXYCODONE HYDROCHLORIDE 5 MG/1
5-10 TABLET ORAL EVERY 6 HOURS PRN
Qty: 10 TABLET | Refills: 0 | Status: SHIPPED | OUTPATIENT
Start: 2019-07-12 | End: 2022-02-22

## 2019-07-12 RX ADMIN — ACETAMINOPHEN 975 MG: 325 TABLET, FILM COATED ORAL at 08:16

## 2019-07-12 RX ADMIN — VON WILLEBRAND FACTOR (RECOMBINANT) 3960 UNITS: KIT at 11:30

## 2019-07-12 RX ADMIN — LEVODOPA AND CARBIDOPA 4 CAPSULE: 145; 36.25 CAPSULE, EXTENDED RELEASE ORAL at 12:51

## 2019-07-12 RX ADMIN — OXYCODONE HYDROCHLORIDE 5 MG: 5 TABLET ORAL at 05:56

## 2019-07-12 RX ADMIN — CALCIUM CARBONATE (ANTACID) CHEW TAB 500 MG 500 MG: 500 CHEW TAB at 05:56

## 2019-07-12 RX ADMIN — CARBIDOPA AND LEVODOPA 1 TABLET: 25; 100 TABLET ORAL at 12:48

## 2019-07-12 RX ADMIN — CLINDAMYCIN PHOSPHATE 900 MG: 900 INJECTION, SOLUTION INTRAVENOUS at 05:56

## 2019-07-12 RX ADMIN — VITAMIN D, TAB 1000IU (100/BT) 2000 UNITS: 25 TAB at 08:16

## 2019-07-12 RX ADMIN — AMANTADINE HYDROCHLORIDE 100 MG: 100 CAPSULE ORAL at 12:48

## 2019-07-12 RX ADMIN — CARBIDOPA AND LEVODOPA 1 TABLET: 25; 100 TABLET ORAL at 08:17

## 2019-07-12 RX ADMIN — ROPINIROLE 2 MG: 2 TABLET, FILM COATED ORAL at 08:16

## 2019-07-12 RX ADMIN — LEVODOPA AND CARBIDOPA 4 CAPSULE: 145; 36.25 CAPSULE, EXTENDED RELEASE ORAL at 08:17

## 2019-07-12 ASSESSMENT — VISUAL ACUITY: OU: NORMAL ACUITY

## 2019-07-12 ASSESSMENT — ACTIVITIES OF DAILY LIVING (ADL)
ADLS_ACUITY_SCORE: 13
ADLS_ACUITY_SCORE: 14

## 2019-07-12 NOTE — DISCHARGE SUMMARY
"[facility] Discharge Summary and Instructions    Dion Villavicencio MRN# [id number]   Age: 58 year old YOB: 1960     Date of Admission:  7/8/2019  Date of Discharge::  7/12/2019  Admitting Physician:  Sidney Cardozo MD  Discharge Physician:  Sidney Cardozo MD          Admission Diagnoses:   Parkinson's disease  Von Willebrand Disease Type 1  Bilateral deep brain stimulation  Parkinson disease (H)  Postop check          Discharge Diagnosis:   Parkinson's disease  Von Willebrand Disease Type 1  Bilateral deep brain stimulation  Parkinson disease (H)  Postop check          Procedures:   Stealth Assisted Bilateral Deep Brain Stimulator Placement, Phase I, Placement Of Bilateral Deep Brain Stimulator Electrode, Target Bilateral Subthalamic Nucleus With Microelectrode Recording    Deep Brain Stimulator Placement, Phase II, Placement Of Deep Brain Stimulator Generator/Battery Over The Left Chest Wall           Brief History of Illness:   Mr. Dion Villavicencio was seen in the Neurosurgery Clinic for discussion of his DBS surgical plan in context of his von Willebrand's disease.  He is a 58-year-old right-handed male with Parkinson's disease that was diagnosed in 2006.  In summary, he has a right hand tremor which is worse with typing. This progressed, and the patient noticed that his tremors are worse and had spread to both feet and his left hand.  He experienced difficulty with eating and buttoning his clothes but can still complete these tasks without assistance.  He also reported being unsteady when standing with his eyes closed.  No falls have been reported.  He felt that his social life was limited due to having to coordinate his medications and meals.  At his preoperative clinic visit, he expressed that his goal for DBS was to control his shaking in his hands and both of his feet.  Additionally, he wanted to live a more \"normal life\" while using less medication.  In terms of his von " Willebrand's disease he did report that he bruises easily and once when he needed stitches for his face, his entire face was bruised due to bleeding.  He was seen and evaluated by Dr. Ma of Hematology/Oncology.  Preoperative Hematology visit laid out a perioperative transfusion treatment to lower his risk of bleeding.  However, his note did not address the overall risk of bleeding given his disease.  However, phone conversation with Dr. Riley regarding this case took place and he stated that if the patient was given transfusion to achieve factor level goals, his risk should be reduced reasonably to the levels similar to those undergoing the surgery without the von Willebrand's disease.     He completed his DBS workup and his case was discussed at the Movement Disorder Consensus Group Meeting.  He was considered to be a good candidate in terms of DBS, bilateral candidate using Abbott system.  However, concerns were raised overall for bleeding risk and plan for risk mitigation, including Hematology/Oncology plan.  We asked him to come to the clinic to go over the overall risk, proposed plan, any questions concerning the surgery and determine the patient's decision.     He notes that he uses a CPAP mask that goes over the head and that he needs a tooth extraction performed.    Upon thorough discussion of the benefits and risks of surgery, the patient elected to undergo above-mentioned procedure.           Hospital Course:   Patient underwent above-mentioned procedures on 7/9/19 and 7/11/19, respectively. The operations were uncomplicated; he was admitted to the surgical ICU for routine post operative cares following his first operation. In the perioperative period surrounding both operations, the patient received 3 doses of clindamycin 900 mg for antibiotic prophylaxis. On hospital day 2, he was doing well and transferred to the floor. On hospital day 3, he returned to the OR for tunneling of DBS electrodes and  generator/battery installation. On hospital day 4, he was ambulating, voiding without a adams, eating a regular diet, pain was well controlled and therefore he was discharged home.    Physical examination at time of discharge:  General: Awake;  Alert, In No Acute Distress  Pulm: Breathing Comfortably   Mental status: Oriented x 3  HEENT: some left facial swelling and periorbital swelling present   Cranial Nerves: able to open and close eyes symmetrically. Tongue protrusion, shoulder shrug, and smile symmetric. No dysarthria. Extraocular muscles intact. Pupils reactive bilaterally   Strength:      Del Tr Bi WE WF Gr  R 5 5 5 5 5 5  L 5 5 5 5 5 5     HF KE KF DF PF   R 5 5 5 5 5   L 5 5 5 5 5   Pronator Drift: Absent  Sensory: Intact to Light Touch  INCISION: bilateral frontal scalp incisions are clean/dry/intact with cyanoacrylate present             Discharge Medications:     Current Discharge Medication List      START taking these medications    Details   oxyCODONE (ROXICODONE) 5 MG tablet Take 1-2 tablets (5-10 mg) by mouth every 6 hours as needed for severe pain  Qty: 10 tablet, Refills: 0    Associated Diagnoses: S/P deep brain stimulator placement; Parkinson disease (H)         CONTINUE these medications which have NOT CHANGED    Details   amantadine (SYMMETREL) 100 MG capsule Take 100 mg by mouth daily (with lunch) 100mg capsule by mouth daily @ 11am-1230pm  Qty: 90 capsule, Refills: 3    Associated Diagnoses: Parkinsonism, unspecified Parkinsonism type (H)      carbidopa-levodopa (SINEMET)  MG per tablet Take 1 tablet by mouth 4 times daily 25/100 tab by mouth @ 7-830am, 1pm and 11pm-1230am and extra if needed = 4/day  Qty: 360 tablet, Refills: 3    Associated Diagnoses: Parkinsonism, unspecified Parkinsonism type (H)      carbidopa-levodopa ER (RYTARY) 36. MG CPCR per CR capsule Take 2-4 capsules by mouth 5 times daily 4 capsules @ 7-830am, 11am-1230p and 3-430pm; and 2 capsules @ 7-830pm and  11pm-1230am      Cholecalciferol (VITAMIN D) 2000 units tablet Take 2,000 Units by mouth every morning 2000 units by mouth daily @ 7-830am  Qty: 100 tablet, Refills: 3      ibuprofen (ADVIL/MOTRIN) 200 MG tablet Take 200 mg by mouth every 4 hours as needed for mild pain      Multiple Vitamins-Minerals (CENTRUM SILVER) per tablet Take 1 tablet by mouth every morning Centrum vitamin by mouth daily @ 7-830am  Qty: 30 tablet      rOPINIRole (REQUIP) 2 MG tablet Take 2 mg by mouth 3 times daily 2mg tab by mouth @ 7-830am, 3-430pm and 11pm-1230am  Qty: 270 tablet, Refills: 3                     Discharge Instructions and Follow-Up:   Discharge diet: Regular   Discharge activity: You may advance activity as tolerated. No strenuous exercise or heay lifting greater than 10 lbs for 4 weeks or until seen and cleared in clinic.   Discharge follow-up: Follow-up with Dr. Sidney Cardozo MD in 2 weeks   Wound care: Ok to shower,however no scrubbing of the wound and no soaking of the wound, meaning no bathtubs or swimming pools. Pat dry only. Leave wound open to air.  Sutures are not absorbable and need to be removed in 2 weeks. If patient still at rehab by this time, the sutures may be removed by the rehab physician if he or she considers that the wound has healed completely.     Please call if you have:  1. increased pain, redness, drainage, swelling at your incision  2. fevers > 101.5 F degrees  3. with any questions or concerns.  You may reach the Neurosurgery clinic at [phone] during regular work hours. ER at [phone].    and ask for the Neurosurgery Resident on call at [phone], during off hours or weekends.         Discharge Disposition:   Discharged to home

## 2019-07-12 NOTE — PROGRESS NOTES
Brief Heme/Onc Progress Note:    Patient appears to have tolerated second procedure well per chart review.  Plan for tomorrow will be to recheck VWF levels in the AM (ordered) and give factor if low.  Assuming patient remains clinically stable he can safely discharge from the hospital following factor repletion (if needed).    Patient should have labs drawn (including F8 assay, VW antigen, and VWF activity) at the [facility] at 8 AM on Monday 7/5 ad then see Dr. Ma in clinic [facility] at 1030 AM to determine if he needs further VWF.  Please include this in discharge orders (I will also message the [facility] directly).    Please note the [facility] [place] [facility] [place]  Patient is familiar with both locations but would be helpful to remind him prior to discharge so he knows where to go on Monday.

## 2019-07-12 NOTE — PROGRESS NOTES
Hematology Staff Note:  VWF panel returned and I was paged with the result:  Results for JOSE AWAD (MRN [id number]) as of 7/12/2019 10:05   Ref. Range 7/12/2019 06:52   Factor 8 Assay Latest Ref Range: 55 - 200 % 175   von Willebrand Antigen Latest Ref Range: 50 - 200 % 132   von Willebrand Factor Activity Latest Ref Range: 50 - 180 % 73       I have written for 40 U/kg of rvWF to be given now prior to discharge home with the goal to keep vWF > 70% for the next 6 days.    We have arranged for vWF panel to be drawn at 0800 on Monday 7/15 and clinic follow up at the [facility] ([address] on the [place]) at 1030 for possible infusion and wound check.    Please don't hesitate to call with questions.    Anish Ma MD   of Medicine, Division of Hematology, Oncology and Transplantation  [facility] [employer]

## 2019-07-12 NOTE — PLAN OF CARE
Met briefly on 6A with Dion Villavicencio and his wife to review follow up plan with the [facility]. They agreed with plan to [place] [facility] [place] [place] [address]) for labs at 7:15 on 7/15 and then see Dr. Ma at 10:30 at our Center that day to determine subsequent plan of care.  Provided card with addresses and parking details and both confirmed understanding.  Letty Shi RN - Nurse Clinician - [facility] - [phone]

## 2019-07-12 NOTE — PLAN OF CARE
AxOx4. Intermittent tremors between Parkinsonian medications. Up SBA/independent, eager to go home. Follow up appointments discussed with patient prior to discharge. Pt verbalized s/s of infection. Requested pads to go over head incision for overnight CPAP. IV removed before discharge. Pt discharged with all belongings with spouse in wheelchair to discharge pharmacy.

## 2019-07-12 NOTE — TELEPHONE ENCOUNTER
Pt left me a VM about setting up DBS programming appointment. Bilateral lead placement was 7/9/19 and battery placement was 7/11/19. Pt states his spouse needs to let her employer know so she can get the time off work. Will route to neurology for scheduling.

## 2019-07-12 NOTE — PLAN OF CARE
Status: POD #3 DBS part 1 and POD #1 for part 2  Vitals: VSS   Neuros: A/O x4. 5/5 extremities.   IV: PIV SL  Resp/trach: CPAP at night with Primapore dressing on FH to (protect incisions from CPAP)  Diet: Regular- TUMS ordered for c/o heartburn, given with relief.  Bowel status: No BM this shift  : Voiding spontaneously   Skin: Bilateral scalp incisions, DARION. Left chest incision, DARION.    Pain: Chest incision pain controlled with Oxycodone 5mg x1.  Activity: Up SBA, GB   Plan:  Continue to follow POC.

## 2019-07-12 NOTE — PLAN OF CARE
Status: POD #2 DBS  Vitals: VSS pt wanted capno to be taken off, sats in the 90's  Neuros: A/O x4. 5/5 extremities.   IV: PIV SL  Resp/trach: CPAP at night   Diet: Regular  Bowel status: No BM this shift  : Voiding spontaneously   Skin: Bilateral scalp incisions, DARION. Covering with primapore during the night for CPAP strap, per Md. Left lateral head incision, DARION. Left chest incision, DARION.    Pain: No c/o pain   Activity: Up SBA, GB   Plan:  Continue to follow POC.

## 2019-07-15 ENCOUNTER — PATIENT OUTREACH (OUTPATIENT)
Dept: CARE COORDINATION | Facility: CLINIC | Age: 59
End: 2019-07-15

## 2019-07-15 ENCOUNTER — PATIENT OUTREACH (OUTPATIENT)
Dept: NEUROSURGERY | Facility: CLINIC | Age: 59
End: 2019-07-15

## 2019-07-15 ENCOUNTER — OFFICE VISIT (OUTPATIENT)
Dept: HEMATOLOGY | Facility: CLINIC | Age: 59
End: 2019-07-15
Attending: INTERNAL MEDICINE
Payer: COMMERCIAL

## 2019-07-15 VITALS
TEMPERATURE: 98.3 F | WEIGHT: 225 LBS | HEIGHT: 68 IN | RESPIRATION RATE: 12 BRPM | BODY MASS INDEX: 34.1 KG/M2 | SYSTOLIC BLOOD PRESSURE: 131 MMHG | DIASTOLIC BLOOD PRESSURE: 85 MMHG | HEART RATE: 95 BPM | OXYGEN SATURATION: 97 %

## 2019-07-15 DIAGNOSIS — D68.01 VON WILLEBRAND DISEASE, TYPE I (H): Primary | ICD-10-CM

## 2019-07-15 DIAGNOSIS — G20.A1 PARKINSON'S DISEASE (H): ICD-10-CM

## 2019-07-15 DIAGNOSIS — D68.00 VON WILLEBRAND'S DISEASE (H): Primary | ICD-10-CM

## 2019-07-15 DIAGNOSIS — Z09 POSTOP CHECK: ICD-10-CM

## 2019-07-15 DIAGNOSIS — D68.00 VON WILLEBRAND'S DISEASE (H): ICD-10-CM

## 2019-07-15 LAB
ANION GAP SERPL CALCULATED.3IONS-SCNC: 4 MMOL/L (ref 3–14)
BASOPHILS # BLD AUTO: 0.1 10E9/L (ref 0–0.2)
BASOPHILS NFR BLD AUTO: 0.8 %
BUN SERPL-MCNC: 16 MG/DL (ref 7–30)
CALCIUM SERPL-MCNC: 9 MG/DL (ref 8.5–10.1)
CHLORIDE SERPL-SCNC: 107 MMOL/L (ref 94–109)
CO2 SERPL-SCNC: 27 MMOL/L (ref 20–32)
CREAT SERPL-MCNC: 0.79 MG/DL (ref 0.66–1.25)
DIFFERENTIAL METHOD BLD: NORMAL
EOSINOPHIL # BLD AUTO: 0.2 10E9/L (ref 0–0.7)
EOSINOPHIL NFR BLD AUTO: 2.2 %
ERYTHROCYTE [DISTWIDTH] IN BLOOD BY AUTOMATED COUNT: 12.4 % (ref 10–15)
FACT VIII ACT/NOR PPP: 105 % (ref 55–200)
GFR SERPL CREATININE-BSD FRML MDRD: >90 ML/MIN/{1.73_M2}
GLUCOSE SERPL-MCNC: 127 MG/DL (ref 70–99)
HCT VFR BLD AUTO: 47.9 % (ref 40–53)
HGB BLD-MCNC: 15.6 G/DL (ref 13.3–17.7)
IMM GRANULOCYTES # BLD: 0.1 10E9/L (ref 0–0.4)
IMM GRANULOCYTES NFR BLD: 1.1 %
LYMPHOCYTES # BLD AUTO: 1.2 10E9/L (ref 0.8–5.3)
LYMPHOCYTES NFR BLD AUTO: 17 %
MCH RBC QN AUTO: 31.2 PG (ref 26.5–33)
MCHC RBC AUTO-ENTMCNC: 32.6 G/DL (ref 31.5–36.5)
MCV RBC AUTO: 96 FL (ref 78–100)
MONOCYTES # BLD AUTO: 0.5 10E9/L (ref 0–1.3)
MONOCYTES NFR BLD AUTO: 6.2 %
NEUTROPHILS # BLD AUTO: 5.3 10E9/L (ref 1.6–8.3)
NEUTROPHILS NFR BLD AUTO: 72.7 %
NRBC # BLD AUTO: 0 10*3/UL
NRBC BLD AUTO-RTO: 0 /100
PLATELET # BLD AUTO: 280 10E9/L (ref 150–450)
POTASSIUM SERPL-SCNC: 4.2 MMOL/L (ref 3.4–5.3)
RBC # BLD AUTO: 5 10E12/L (ref 4.4–5.9)
SODIUM SERPL-SCNC: 139 MMOL/L (ref 133–144)
VWF CBA/VWF AG PPP IA-RTO: 88 % (ref 50–200)
VWF:AC ACT/NOR PPP IA: 35 % (ref 50–180)
WBC # BLD AUTO: 7.2 10E9/L (ref 4–11)

## 2019-07-15 PROCEDURE — 99213 OFFICE O/P EST LOW 20 MIN: CPT | Performed by: INTERNAL MEDICINE

## 2019-07-15 ASSESSMENT — MIFFLIN-ST. JEOR: SCORE: 1828.27

## 2019-07-15 ASSESSMENT — PAIN SCALES - GENERAL: PAINLEVEL: NO PAIN (0)

## 2019-07-15 NOTE — PROGRESS NOTES
Date: 7/15/2019 Status: [facility]   Time: 10:30 AM Length: 30   Visit Type: RETURN BLEEDING DISORDER [1890] BILL: [id number]   Provider: Anish Ma MD Department: [facility] HEMOPHILIA     Patient has clinic visit within 24-48 hours of Discharge so no post DC follow up call is needed

## 2019-07-15 NOTE — PROGRESS NOTES
"[facility]: Post-Discharge Note  SITUATION                                                      Admission:    Admission Date: 07/08/19   Reason for Admission: Bilateral DBS lead placement, left side battery placement    Discharge:   Discharge Date: 07/12/19  Discharge Diagnosis: Parkinson's disease; Von Willebrande's disease  Discharge Service: Neurosurgery  Discharge Plan: Routine post op follow up; hematology follow up    BACKGROUND                                                      Neurosurgery Discharge Coordination Note     Attending physician: Dr. Cardozo  Discharge to: Home     Current status: Patient states he  feels \"great\" since hospital discharge. Denies incision pain.   Denies redness, swelling, increased tenderness, drainage, incision opening or elevated temp. Reports Incision CDI without signs of infection.  Denies current bowel or bladder issues.    Pt indicates he is pleased with how the von Willebrand's infusion plan worked on his admission date and during his hospital stay. He did not receive the first infusion on 7/8 until late in the evening, so we probably could have planned his admission for later in the day. This is useful information for future patients.     Discharge instructions and medications reviewed with patient.  Follow up appointments/imaging/tests needed: 2 week post op with MATTHEW Sheehan 7/25/19.     RN triage/on call number given: [phone]/ [phone]    ASSESSMENT      Discharge Assessment  Patient reports symptoms are: Improved  Does the patient have all of their medications?: Yes  Does patient know what their new medications are for?: Yes  Does patient have a follow-up appointment scheduled?: Yes  Does patient have any other questions or concerns?: No    Post-op  Did the patient have surgery or a procedure: Yes  Incision: closed;healing  Drainage: No  Bleeding: none  Fever: No  Chills: No  Redness: No  Warmth: No  Swelling: No  Incision site pain: " No  Closure: suture;dissolving  Eating & Drinking: eating and drinking without complaints/concerns  PO Intake: regular diet  Bowel Function: normal  Urinary Status: voiding without complaint/concerns        PLAN                                                      Outpatient Plan:  Routine post op follow up    Future Appointments   Date Time Provider Department Center   7/25/2019 10:00 AM Esperanza Sheehan APRN CNP [facility]           DYLON HUNTER RN

## 2019-07-16 NOTE — PROGRESS NOTES
[facility]  [facility] [place] [address]  Phone: [phone], Fax: [phone]      Outpatient Visit Note:    Patient: Dion Awad  MRN: [id number]  : 1960  MARY: Jul 15, 2019    Reason for Visit:  Follow up for Type 1 VWD, post op for DBS for Parkinson's Disease    Interval History: Dion Awad is a 58 year old male  with a history of Type 1 vWD who returns for routine follow up after surgery.  He was treated with 60 U/kg pre-op, another 40 U/kG post op ( 170) to keep vWF activity > 60%.  Prior to discharge on  he got more dose of 40u/kg and returns today for followup.  He reports doing well with no bleeding from the scalp or chest wall pocket.  He has little pain at the chest wall site and a little bit of erythema and superficial bruising as expected from the procedure.    Results for DION AWAD (MRN [id number]) as of 2019 08:14   Ref. Range 2019 06:10 2019 17:01 7/10/2019 06:21 2019 03:38 2019 06:52 7/15/2019 07:20   Factor 8 Assay Latest Ref Range: 55 - 200 % 87 126 138 201 (H) 175 105   von Willebrand Antigen Latest Ref Range: 50 - 200 % 109 98 167 155 132 88   von Willebrand Factor Activity Latest Ref Range: 50 - 180 % 80 57 128 106 73 35 (L)       Past Medical History:  Past Medical History:   Diagnosis Date     Encounter for neuropsychological testing 2018    IMPRESSIONS AND RECOMMENDATIONS   Current results indicate impairments in learning, which are somewhat variable, and memory that falls well within normal limits. Language, visual processing, complex attentional processing, and executive functioning fall within normal limits, generally in the above average range. He denied experiencing significant depressive symptomatology, apathy, or anxiety.   Th     Esophageal reflux 2018     H/O magnetic resonance imaging of brain and brain stem 2018    MR BRAIN W/O & W CONTRAST 2018 2:30 PM   History: ; Parkinson's disease (H) ICD-10: Parkinson's disease (H)  Comparison:  3/14/2006    Technique: Midline sagittal T1-weighted images and axial diffusion with images through the whole brain were obtained. Axial and coronal T2-weighted images, and inversion recovery images were obtained with focus on the evans through the corpus callosum, obtained      Hx of von Willebrand's disease 7/29/2018     Parkinson disease (H) 7/29/2018       Medications:  Current Outpatient Medications   Medication Sig     amantadine (SYMMETREL) 100 MG capsule Take 100 mg by mouth daily (with lunch) 100mg capsule by mouth daily @ 11am-1230pm     carbidopa-levodopa (SINEMET)  MG per tablet Take 1 tablet by mouth 4 times daily 25/100 tab by mouth @ 7-830am, 1pm and 11pm-1230am and extra if needed = 4/day     carbidopa-levodopa ER (RYTARY) 36. MG CPCR per CR capsule Take 2-4 capsules by mouth 5 times daily 4 capsules @ 7-830am, 11am-1230p and 3-430pm; and 2 capsules @ 7-830pm and 11pm-1230am     Cholecalciferol (VITAMIN D) 2000 units tablet Take 2,000 Units by mouth every morning 2000 units by mouth daily @ 7-830am     Multiple Vitamins-Minerals (CENTRUM SILVER) per tablet Take 1 tablet by mouth every morning Centrum vitamin by mouth daily @ 7-830am     rOPINIRole (REQUIP) 2 MG tablet Take 2 mg by mouth 3 times daily 2mg tab by mouth @ 7-830am, 3-430pm and 11pm-1230am     ibuprofen (ADVIL/MOTRIN) 200 MG tablet Take 200 mg by mouth every 4 hours as needed for mild pain     oxyCODONE (ROXICODONE) 5 MG tablet Take 1-2 tablets (5-10 mg) by mouth every 6 hours as needed for severe pain (Patient not taking: Reported on 7/15/2019)     No current facility-administered medications for this visit.        Allergies:  Allergies   Allergen Reactions     Penicillins      Hives     Pramipexole      Leg swelling and compulsive behavior       ROS:  A 14 point ROS is negative except as stated in the HPI    Objective:  Vitals: B/P: 131/85, T:  98.3, P: 95, R: 12, Wt: 225 lbs 0 oz  Exam:   Gen: Appears well, no distress  HEENT: 2 healing, sutured scalp incisions without oozing or bleeding. no scleral icterus or hemorrhage, no wet purpura, no lymphadenopathy  Chest/Skin: examine the L chest wall pocket which looks hemostatic, no oozing or bleeding.  Very small ecchymoses around the incision site.    Labs:  As per HPI, today vWF activity is down to 35%, FVIII is 105    Imaging:  none    Assessment:  In summary, Dion Villavicencio is a 58 year old man with Type 1 vWD, doing well post op, after DBS and factor replacement to keep VWF activity > 80%.  He's now post op day 6 from his brain surgery and and day 4 from his pacemaker/battery pack procedure.  Will plan for 1 last dose of vWF concentrate (typically 1 week post op is recommended, which would be tomorror for brain surgery and Weds for his pacer).    Plan:  1. Majority of today's visit was spent counseling the patient regarding watching for bleeding symptoms including oozing, new swelling of the chest wall pocket or pain or active bleeding.  2. rVWF concentrate (VonVendi) 40 U/kg infused in clinic  3. RTC as needed - he will call with any concerning symptoms with this surgery and follow up as needed for any future procedures.    The patient is given our center's contact information and is instructed to call if he should have any further questions or concerns.  Otherwise, we will plan on seeing him back as needed.      Total Time Spent:  I spent a total of 15 minutes face-to-face with Dion Villavicencio during today's office visit.  Over 50% of this time was spent counseling the patient and/or coordinating care regarding Type 1 VWD.      Anish Ma MD   of Medicine  [facility] [employer]

## 2019-07-17 ENCOUNTER — TELEPHONE (OUTPATIENT)
Dept: NEUROLOGY | Facility: CLINIC | Age: 59
End: 2019-07-17

## 2019-07-17 ENCOUNTER — DOCUMENTATION ONLY (OUTPATIENT)
Dept: OTHER | Facility: CLINIC | Age: 59
End: 2019-07-17

## 2019-07-17 DIAGNOSIS — G20.A1 PARKINSON DISEASE (H): Primary | ICD-10-CM

## 2019-07-17 NOTE — TELEPHONE ENCOUNTER
Patient is scheduled for Deep Brain Stimulation initial programming on 8/6 and 8/16. Will send letter to clarify.

## 2019-07-18 NOTE — OP NOTE
PATIENT NAME: DION VILLAVICENCIO  YOB: 1960  MRN:   [id number]  ACCOUNT:  [id number]      DATE OF PROCEDURE:  07/09/2019    PREOPERATIVE DIAGNOSIS:    1.  Parkinson s disease  2.  Bilateral tremor  3.  Von Willebrand s disease      POSTOPERATIVE DIAGNOSIS:    1.  Parkinson s disease  2.  Bilateral tremor  3.  Von Willebrand s disease      PROCEDURES PERFORMED:   1.  Placement of CRW stereotactic headframe.   2.  Stereotactic neuronavigation planning and CT of head.   3.  Stereotactic neuronavigation using the Cmed system for surgical planning, targeting and approach. The target is left and right subthalamic nucleus (STN).   4.  Left side deep brain stimulator placement, phase I, placement of left side deep brain stimulator electrode, target is left subthalamic nucleus (STN).   5.  Right side deep brain stimulator placement, phase I, placement of right side deep brain stimulator electrode, target is right subthalamic nucleus (STN).  6.  Use of intraoperative microelectrode recording.  7.  Use of intraoperative fluoroscopy.     ATTENDING SURGEON:  Sidney Cardozo MD, PhD     RESIDENT SURGEON:  Lawrence Randle MD    ANESTHESIA:  General anesthesia    ESTIMATED BLOOD LOSS:  50 mL    COMPLICATIONS:  None.    FINDINGS:  Bilateral subthalamic nucleus electrodes.  Left side electrode location, medial Davonte Gun position, 0.71 mm below target.  Right side electrode location, center Davonte Gun position, 0.23 mm below target.  All impedance values within normal.  Wires tunneled to the left side.    IMPLANTS:  1.  Pratt Guardian jada hole cover, REF 6010.  Left side Lot# [id number], Right side Lot# [id number].  2.  Pratt DBS electrode, Infinity 0.5, REF 6172.  Left side S/N [id number], Right side S/N [id number].    INDICATIONS FOR PROCEDURE:  Mr. Dion Villavicencio was evaluated in the Neurosurgery [facility] for DBS surgery in context of his von Willebrand's disease.  He is a 58-year-old right-handed male with Parkinson's disease that was  "diagnosed in 2006.  In summary, he has a right hand tremor which is worse with typing.  He has now noticed that his tremors are worse which has spread to both feet and his left hand.  He has difficulty with eating and buttoning his clothes, but he can still complete these tasks without assistance.  He also reports being unsteady when standing with his eyes closed.  No falls have been reported.  He feels that his social life is now limited due to having to coordinate his medications and meals.  His goal for DBS is to control his shaking in his hands and both of his feet.  Additionally, he would like to live a more \"normal life\" while using less medication.  In terms of his von Willebrand's disease, he does report that he bruises easily and once when he needed stitches for his face, his entire face was bruised due to bleeding.  He was seen and evaluated by Dr. Ma of Hematology at [facility].  The plan is for the patient to be given transfusion to achieve factor level goals in order to reduce his surgical bleeding risk to a reasonable level, similar to those undergoing the surgery without the von Willebrand's disease.  He completed his DBS workup and his case was discussed at the Movement Disorder Consensus Group Meeting.  He was considered to be a good candidate in terms of DBS, bilateral candidate using Abbott system.  In order to reduce his overall risk of the procedure and the need for treatment and monitoring of this von Willebrand s disease, the following plan was formulated in conjunction with Dr. Ma of Hematology.  Patient is scheduled to be admitted on Monday, July 8th for preoperative evaluation and preoperative treatment.  All vWF concentrate will be dosed by Hematology Consult team.  He will be given 2200 rvWF  (Vonvendi) at 60 units/kg IV.  Orders have been written by BETH Carrasquillo, [facility].  On Tuesday, July 9th, patient will undergo DBS stimulator placement, phase I. "  Preoperatively, at 6 am, vWF antigen, vWF activity, F8 levels will be drawn.  Special coagulation lab has been alerted to run these samples first thing in the AM.  If vWF level > 90% and F8 > 90% then, he would be cleared for surgery without further hematology input.  No intra-operative levels will need to be drawn.  Postoperatively, vWF antigen, activity and F8 labs will be sent.  On Wednesday, July 10th, patient will remain in hospital and morning and morning vWF antigen, activity, F8 labs will be sent with goal > 80% vWF activity and F8 activity.  On Thursday July 11th, patient will undergo DBS generator placement with morning vWF antigen, activity, F8 labs to be drawn with goal > 80% vWF activity and F8 activity.  The plan, if no complications, is to have the patient discharged to home on, Friday July 12th, with hematology to arrange for outpatient infusions if needed.  With even the above plan, we discussed that there is an elevated surgical risk based on his von Willebrand's disease.  Risks, benefits, alternative therapies were discussed with the patient, including but not limited to infection and bleeding (intracranial), injury to the brain, stroke, death, hardware failure and possible need for more surgeries.  We especially discussed the increased risk of intracranial bleeding, despite our attempts at reducing the risk with change in our protocol and transfusions.  Surgical procedure was discussed in detail.  All questions were answered, and he expressed understanding.     DESCRIPTION OF PROCEDURE:      CRW head frame placement and stereotactic neuronavigation.      The patient was brought to the operating room and placed in a supine position in his bed.  Brief timeout was performed for placement of the CRW head frame.  He was given sedation to make him comfortable.  Intended pin sites, two in the front and two in the back of the head, were cleaned and were injected with local anesthetic, 1% Lidocaine with  epinephrine.  Total of 24 mL were used.  Then, CRW stereotactic head frame was placed onto his head with the four pins for fixation.  Care was taken so that the fiducial box would fit over the head and the frame.  Once the head frame was on, the fiducial box was easily placed without interference from his forehead.  Guerra catheter was also placed.  The patient tolerated the procedure well and his sedation was lightened and he was awakened.     After the CRW stereotactic head frame was placed, he was taken directly to the CT scanner, at which time a CT scan of the head stereotactic protocol was obtained.  Subsequently, the patient was taken back up to the operating room where he was placed supine on the operative bed with the CRW head frame affixed to the bed as well.  Patient was in a slight sitting up position with the neck in a neutral position.  The bed was positioned so that it would be a beach chair reclining position.  All pressure points were well padded.  Care was taken to make sure that the neck was in neutral position and that the [place] head isidro device had room for manipulation in case more flexion or extension is needed throughout the case.     At this time, attention was turned to the neuro navigation imaging that was obtained.  The Stealth neuronavigation device was loaded with the CT head that had just been obtained.  The device also had an MRI of the head, stereotactic protocol, that was obtained prior to surgery.  CT of the head was merged with the previously obtained MRI of the brain.  The merge demonstrated good coherence/registration.  Then, using this merged image, neuronavigation was used to stereotactically target the left and right subthalamic nucleus (STN).  The technique used was the AC-PC line localization technique to target the STN using stereotactic coordinates and the X, Y and Z as well as the ring and arc angles for the CRW head frame were obtained for the right side.  The entry  into the skull, as well as the trajectory of the electrode were checked with a probe's eye view to avoid any sulci, ventricle or vascular structures.    At this time, attention was turned back to the patient.  Using a hair clipper, his hair over the bifrontal area was clipped.  A semicircular C-shaped incisions were planned on the left and right side.  Then, the surgical area was prepped and draped in routine surgical fashion.  We also prepped the area posterior to this all the way beyond the parietal areas.     Timeout was then performed confirming the patient's identity, procedure to be performed, side and site of surgery identified, imaging corresponding with the patient and administration of preoperative prophylactic antibiotic.    Right side jada hole preparation    The stereotactic coordinates for the right side was then transferred to the CR stereotactic targeting apparatus as well as the phantom base.  The coordinates were confirmed and double checked for accuracy.  Accuracy was also confirmed using phantom base.  The coordinates were X = +11.5, Y = +0.2, Z = -8.4, ring angle = 56.7 degrees anterior, and arc angle = 11.9 degrees to the right.     The CRW targeting apparatus was attached to the head frame on top of the sterile drapes.  The entry point was marked on the scalp on the right side.  A C-shaped incision was made with a skin knife, after the area was injected with local anesthetic, 1% Lidocaine with epinephrine and 1/2% Marcaine plain, 50:50 mix.  The area posterior to the incision was also injected as a pocket would be created.  Area posterior medial, towards the left parietal area was also injected as the electrode connector will be tunneled here later.  Total of 29 mL of the above mentioned local anesthetic was used throughout the remainder of the case.  The incision was then further carried down to the pericranium.  Hemostasis was obtained using monopolar and bipolar cautery.  Thin layer of  pericranial tissue was left behind on the scalp and the skin flap was reflected posteriorly.  Then, using a blunt dissection technique, a pocket was created posteriorly as well as a track that was made towards the left parietal area for later use.    At this time, the targeting apparatus again positioned and the entry point to the skull was marked.  Area of the intended jada hole was cleaned and pericranial tissue removed.  Then using an acorn drill, jada hole was created over this entry point to expose the dura.  The area was vigorously irrigated and bone wax used to control the bone bleeding.  Dura was coagulated with bipolar cautery for hemostasis, and again no active bleeding was noted.     At this time, the electrode fixation cover was fixed to the skull using two screws.  Care was taken to overlap the pericranium over the edge of the cover to provide a smooth tissue transition.  The entry into the dura was again checked.  It appeared that the opening was adequate for entry without any interference from the bone edge.    Left side jada hole preparation    The stereotactic coordinates for the left side was then transferred onto the CRW stereotactic targeting apparatus.  The coordinates were confirmed and double checked for accuracy.  The coordinates were X = -12.3, Y = +0.5, Z = -9.9, ring angle = 60.5 degrees anterior, and arc angle = 10.7 degrees to the left.     The CRW targeting apparatus was still attached to the head frame on top of the sterile drapes.  The entry point was marked on the scalp on the left side.  A C-shaped incision was made with a skin knife, after the area was injected with local anesthetic, 1% Lidocaine with epinephrine and 1/2% Marcaine plain, 50:50 mix.  The area posterior to the incision was also injected as a pocket would be created.  Area posterior lateral, towards the left parietal area was also injected as the electrode connector will be tunneled here later.  The incision was then  further carried down to the pericranium.  Hemostasis was obtained using monopolar and bipolar cautery.  Thin layer of pericranial tissue was left behind on the scalp and the skin flap was reflected posteriorly.  Then, using a blunt dissection technique, a pocket was created posteriorly as well as a track that was made towards the left parietal area for later use.    At this time, the targeting apparatus again positioned and the entry point to the skull was marked.  Area of the intended jada hole was cleaned and pericranial tissue removed.  Then using an acorn drill, jada hole was created over this entry point to expose the dura.  The area was vigorously irrigated and bone wax used to control the bone bleeding.  Dura was coagulated with bipolar cautery for hemostasis, and again no active bleeding was noted.     At this time, the electrode fixation cover was fixed to the skull using two screws.  Care was taken to overlap the pericranium over the edge of the cover to provide a smooth tissue transition.  The entry into the dura was again checked.  It appeared that the opening was adequate for entry without any interference from the bone edge.    Left side electrode placement: target left STN    Dr. Pawan Ewing and Ms. Chasity Nair of the Neurology Department participated in the recording and neurological testing.  During the microelectrode recording and testing, Dr. Ewing and Ms. Nair took detailed notes of the electrophysiologic data and neurologic exam as well as any stimulation effects.    Using a Bovie cautery and a #4 Penfield instrument, opening was made into the dura, as well as a small corticectomy.  No active bleeding was noted.  At this time, the electrode guide tubes were inserted in the center, anterior and lateral Davonte Gun positions and they were advanced slowly until they were fully inserted at which point the tips would be well above the target.  No abnormal resistance was noted.  Duraseal was used to  seal the entry site to provide a seal and to minimize cerebrospinal fluid leak and air entry.  Then, recording microelectrodes were brought in and placed within the guide tubes and they were connected for intraoperative recording.  The tips of the electrode were now 15 mm above target.  The patient was awakened.  Then, using a micro drive, the electrodes were slowly advanced towards the target, collecting microelectrode recording data for mapping the track.  Throughout the track, good quality expected recording was observed.  The anterior track yielded moderate dense poorly isolated cell activity throughout.  No SSR was detected.  Microstimulation produced no paresthesias.  Center track yielded robust dense cell activity and approximately 5.65 mm of STN with SSR in elbow, wrist and fingers.  Microstimulation produced no paresthesias.  Lateral track yielded quiet poorly isolated activity throughout.  No clear STN was identified.  SSR was not tested.  Microstimulation yielded low to moderate threshold internal capsular effects in the tongue and intraorally.    Based on the electrophysiology data collected, it was thought that we were too lateral and that medial Davonte Gun area needed exploration and mapping.  The microelectrode recording continued.  The electrodes were then pulled back to the initial position with the microdrive and then pulled out of the guide tubes.  The center guide tube remained in place to stabilize the brain and anterior and lateral guide tubes were pulled out of the brain.  Then, new guide tube was inserted into the medial Davonte Gun position and into the brain.  DuraSeal was used to seal the dural entry point and prevent CSF leakage.  The recording microelectrode was placed in the medial guide tube and connected for recording.  The tip of the electrode was now 15 mm above the target.  The patient remained awake.  Using the microdrive, the electrode was slowly advanced towards the target, collecting  microelectrode recording data for mapping of the track.  The recording yielded low quality dense cell activity with approximately 5.2 to 5.9 mm of STN.  SSR was not tested.  Microstimulation yielded no side effects.    Based on the mapping data, it was decided that the current medial Davonte Gun position may be the best placement for the permanent electrode.  The electrode drive was then positioned to the desired position with the micro drive and the electrode pulled out of the guide tube.  The permanent DBS electrode was brought into the field and placed in the medial guide tube with the tip being placed at the 0.71 mm below the target depth.  The electrode demonstrated good impedance values.  The electrode was tested and stimulation caused reduction in rigidity and tremor and adequate threshold for side effects which were only transient.  Based on the results, it was thought that the current location may be the best location of the permanent electrode.    With the satisfactory testing of the electrode position and the stimulation parameters, the electrode was secured at this location.  The guide tubes were removed.  Duraseal was again used to seal any opening.  The area was generously irrigated.  Hemostasis was also obtained.  Fluoroscopy was brought into the field to test that the electrode did not move with each manipulation.  The electrode tip was seen to be below the target, as expected.  The electrode clamp was applied to hold the electrode.  Then, the electrode stylet was removed. The electrode was then removed from the electrode isidro.  The cap cover was finally placed and secured in place.  Fluoroscopy confirmed that the tip of the electrode did not change in position with each manipulation.  The directional marker, on fluoroscopy, also demonstrated that the contact 2A is facing anteriorly.    The connecting portion of the electrode was covered with a protective covering/dead-end connector, and this apparatus  was inserted into the subgaleal space/pocket towards the left parietal area that was created at the beginning of the case.  The excess wire was also buried posteriorly in the previously created pocket.  Fluoroscopy confirmed that the tip did not move.  The wound was then generously irrigated.    Right side electrode placement: target right STN    The CR targeting apparatus was still attached to the head frame on top of the sterile drapes.  The stereotactic coordinates for the right side was then transferred to the [facility] stereotactic targeting apparatus.  The coordinates were confirmed and double checked for accuracy.  The coordinates were X = +11.5, Y = +0.2, Z = -8.4, ring angle = 56.7 degrees anterior, and arc angle = 11.9 degrees to the right.    Using a Bovie cautery and a #4 Penfield instrument, opening was made into the dura, as well as a small corticectomy.  There was some venous bleeding noted from a cortical vessel.  With some effort and bipolar cautery, hemostasis was obtained.  At this time, the electrode guide tubes were inserted in the center, anterior and lateral Davonte Gun positions and they were advanced slowly until they were fully inserted at which point the tips would be well above the target.  No abnormal resistance was noted.  Duraseal was used to seal the entry site to provide a seal and to minimize cerebrospinal fluid leak and air entry.  Then, recording microelectrodes were brought in and placed within the guide tubes and they were connected for intraoperative recording.  The tips of the electrode were now 15 mm above target.  The patient was awakened.  Then, using a micro drive, the electrodes were slowly advanced towards the target, collecting microelectrode recording data for mapping the track.  Throughout the track, good quality expected recording was observed.  The anterior track yielded low dense poorly isolated cell activity throughout.  No STN or SSR was detected.  Microstimulation  produced no side effects.  Center track yielded robust dense cell activity and approximately 5.56 mm of STN with SSR in the shoulder.  Microstimulation produced no paresthesias.  Lateral track yielded robust dense cell activity with 4.98 mm of STN with SSR in shoulder and tongue.  No side effects with microstimulation was noted.      Based on the electrophysiology data collected, it was thought that posterior Davonte Gun area needed exploration and mapping.  The microelectrode recording continued.  The electrodes were then pulled back to the initial position with the microdrive and then pulled out of the guide tubes.  The center guide tube remained in place to stabilize the brain and anterior and lateral guide tubes were pulled out of the brain.  Then, new guide tube was inserted into the posterior Davonte Gun position and into the brain.  DuraSeal was used to seal the dural entry point and prevent CSF leakage.  The recording microelectrode was placed in the posterior guide tube and connected for recording.  The tip of the electrode was now 15 mm above the target.  The patient remained awake.  Using the microdrive, the electrode was slowly advanced towards the target, collecting microelectrode recording data for mapping of the track.  The recording yielded moderately dense cell activity with approximately 4.05 mm of STN.  SSR was not tested.  Microstimulation yielded no side effects.    Based on the mapping data, it was decided that the current center Davonte Gun position still may be the best placement for the permanent electrode.  The electrode drive was then positioned to the desired position with the micro drive and the electrode pulled out of the guide tube.  The permanent DBS electrode was brought into the field and placed in the cdnter guide tube with the tip being placed at the 0.23 mm below the target depth.  The electrode demonstrated good impedance values.  The electrode was tested and stimulation caused reduction in  rigidity and tremor and adequate threshold for side effects which were only transient.  Based on the results, it was thought that the current location may be the best location of the permanent electrode.    With the satisfactory testing of the electrode position and the stimulation parameters, the electrode was secured at this location.  The guide tubes were removed.  Duraseal was again used to seal any opening.  The area was generously irrigated.  Hemostasis was also obtained.  Fluoroscopy was brought into the field to test that the electrode did not move with each manipulation.  The electrode tip was seen to be below the target, as expected.  The electrode clamp was applied to hold the electrode.  Then, the electrode stylet was removed. The electrode was then removed from the electrode isidro.  The cap cover was finally placed and secured in place.  Fluoroscopy confirmed that the tip of the electrode did not change in position with each manipulation.  The directional marker, on fluoroscopy, also demonstrated that the contact 2A is facing anteriorly.    The connecting portion of the electrode was covered with a protective covering/dead-end connector, and this apparatus was inserted into the subgaleal space/pocket towards the left parietal area, which is closer to the vertex, that was created at the beginning of the case.  The excess wire was also buried posteriorly in the previously created pocket.  Fluoroscopy confirmed that the tip did not move.  The wound was then generously irrigated.    Wound closure, removal of the head frame and end of procedure    Patient was again made comfortable.  We began closing the wound.  The left side was closed first.  The galeal layer was reapproximated using 3-0 Vicryl sutures in an inverted interrupted fashion and the skin was reapproximated using 4-0 Monocryl suture in a running fashion.  The right side was closed second.  The galeal layer was reapproximated using 3-0 Vicryl  sutures in an inverted interrupted fashion and the skin was reapproximated using 4-0 Monocryl suture in a running fashion.  The wounds were cleaned and dried and prepped with ChoraPrep.  Then, Exofin skin glue was applied.    The stereotactic targeting apparatus was removed.  Then, the sterile drapes were removed.  Subsequently, the patient was taken out of the CRW head frame.  The four pin sites were clean and dry and no active bleeding was noted, except for the left posterior pin site which required two staples.  Antibiotic ointment was applied to the pin sites.     Patient was further awakened and taken to the recovery room in a stable condition.  At the end of the case, all counts including needle, sponge and instrument counts were correct x 2.  There were no complications.    IAdam M.D., Ph.D., Neurosurgery Attending, was present and scrubbed for the entire case and performed the key and critical portions of the case.      ADAM HATHAWAY MD, PHD

## 2019-07-20 NOTE — OP NOTE
PATIENT NAME: DION WAAD  YOB: 1960  MRN:   [id number]  ACCOUNT:  [id number]      DATE OF PROCEDURE:  07/11/2019    PREOPERATIVE DIAGNOSIS:    1.  Parkinson s disease  2.  Bilateral tremor  3.  Von Willebrand s disease    4.  S/p bilateral deep brain stimulator placement, phase I, placement of bilateral deep brain stimulator electrodes, target bilateral subthalamic nucleus, with microelectrode recording on 7/9/2019    POSTOPERATIVE DIAGNOSIS:  1.  Parkinson s disease  2.  Bilateral tremor  3.  Von Willebrand s disease    4.  S/p bilateral deep brain stimulator placement, phase I, placement of bilateral deep brain stimulator electrodes, target bilateral subthalamic nucleus, with microelectrode recording on 7/9/2019    PROCEDURES PERFORMED:  1.  Deep brain stimulator placement, phase II, placement of new deep brain stimulator generator/battery over the left chest wall.  2.  Placement of two extension wires and connection of the left and right side deep brain stimulator electrodes, two electrode arrays, to the new generator/battery.  3.  Electrical analysis of the deep brain stimulator system.    ATTENDING SURGEON:  Sidney Cardozo MD, PhD    ASSISTANT SURGEON:  Lawrence Randle MD    ANESTHESIA:  General anesthesia.    ESTIMATED BLOOD LOSS:  5 mL.    COMPLICATIONS:  None.    IMPLANTS:   1.  Abbott DBS extension wire, [id number], S/N [id number] for the left side.  2.  Abbott DBS extension wire, [id number], S/N [id number] for the right side.  3.  Abbott DBS generator/battery, Infinity 7, [id number], S/N OIC196.1    INDICATIONS FOR PROCEDURE:  Mr. Dion Awad returns for his planned ongoing DBS surgery in context of his von Willebrand's disease.  He underwent deep brain stimulator placement, phase I, placement of bilateral deep brain stimulator electrodes, target bilateral subthalamic nucleus, with microelectrode recording on 7/9/2019.  He tolerated the procedure well and there were no complications.  His postoperative  "CT head was without any complicating features.  His von Willebrand s disease was being managed as planned, as we complete the series of DBS surgery for the patient.  He is again cleared to undergo surgery today.  Briefly, he is a 58-year-old right-handed male with Parkinson's disease that was diagnosed in 2006.  In summary, he has a right hand tremor which is worse with typing.  He has now noticed that his tremors are worse which has spread to both feet and his left hand.  He has difficulty with eating and buttoning his clothes, but he can still complete these tasks without assistance.  He also reports being unsteady when standing with his eyes closed.  No falls have been reported.  He feels that his social life is now limited due to having to coordinate his medications and meals.  His goal for DBS is to control his shaking in his hands and both of his feet.  Additionally, he would like to live a more \"normal life\" while using less medication.  In terms of his von Willebrand's disease, he does report that he bruises easily and once when he needed stitches for his face, his entire face was bruised due to bleeding.  He was seen and evaluated by Dr. Ma of Hematology at [facility].  The plan is for the patient to be given transfusion to achieve factor level goals in order to reduce his surgical bleeding risk to a reasonable level, similar to those undergoing the surgery without the von Willebrand's disease.  He completed his DBS workup and his case was discussed at the Movement Disorder Consensus Group Meeting.  He was considered to be a good candidate in terms of DBS, bilateral candidate using Abbott system.  In order to reduce his overall risk of the procedure and the need for treatment and monitoring of this von Willebrand s disease, the following plan was formulated in conjunction with Dr. Ma of Hematology.  Patient is scheduled to be admitted on Monday, July 8th for preoperative evaluation and preoperative " treatment.  All vWF concentrate will be dosed by Hematology Consult team.  He will be given 2200 rvWF  (Vonvendi) at 60 units/kg IV.  Orders have been written by BETH Carrasquillo, [facility].  On Tuesday, July 9th, patient will undergo DBS stimulator placement, phase I.  Preoperatively, at 6 am, vWF antigen, vWF activity, F8 levels will be drawn.  Special coagulation lab has been alerted to run these samples first thing in the AM.  If vWF level > 90% and F8 > 90% then, he would be cleared for surgery without further hematology input.  No intra-operative levels will need to be drawn.  Postoperatively, vWF antigen, activity and F8 labs will be sent.  On Wednesday, July 10th, patient will remain in hospital and morning and morning vWF antigen, activity, F8 labs will be sent with goal > 80% vWF activity and F8 activity.  On Thursday July 11th, patient will undergo DBS generator placement with morning vWF antigen, activity, F8 labs to be drawn with goal > 80% vWF activity and F8 activity.  The plan, if no complications, is to have the patient discharged to home on, Friday July 12th, with hematology to arrange for outpatient infusions if needed.  With even the above plan, we discussed that there is an elevated surgical risk based on his von Willebrand's disease.  Risks, benefits, alternative therapies were discussed with the patient, including but not limited to infection and bleeding (intracranial), injury to the brain, stroke, death, hardware failure and possible need for more surgeries.  We especially discussed the increased risk of intracranial bleeding, despite our attempts at reducing the risk with change in our protocol and transfusions.  Surgical procedure was discussed in detail.  All questions were answered, and he expressed understanding.     DESCRIPTION OF PROCEDURE:  The patient was brought to the operating room and was laid supine on the operating table.  The patient was  identified by me and with placement of endotracheal tube, general anesthesia was obtained.  All pressure points were well padded.  His left chest area as well as the left neck and left parietal region of the head was visualized as his head was turned to the right and a hair clipper was used to remove some hair over the palpable wire connector portion in his left lateral parietal and high parietal near the vertex area from his phase I surgery.  Two separate connectors could be palpated.  He also had two staples removed from his left posterior pin site.  The area mentioned above were then prepped and draped in routine surgical fashion.  A time out was performed confirming the patient's identity, procedure to be performed, site and side of the surgery and the administration of preoperative prophylactic antibiotic.    At this time, a total of 40 mL of 1% Lidocaine with epinephrine was injected into the three intended incision sites, one over the left chest wall, several centimeters below the clavicle and one over the left lateral parietal and high parietal near the vertex areas, the recently placed left and right side DBS electrode connector locations, respectively.    Attention was first turned to the left lateral parietal area.  The recently placed left sided DBS electrode connector was palpable.  Using a skin knife, a linear incision of approximately 2 cm was made over the palpable connector.  The incision was deepened.  Then, using a mosquito forceps, the protective cover for the left sided electrode connector was found.  This was slowly teased out of the incision with enough length of the wire for manipulation.    Attention was then turned to the left high parietal area near the vertex.  The recently placed right sided DBS electrode connector was palpable.  Using a skin knife, a linear incision of approximately 2 cm was made over the palpable connector.  The incision was deepened.  Then, using a mosquito forceps,  the protective cover for the right sided electrode connector was found.  This was slowly teased out of the incision with enough length of the wire for manipulation.  Then, using a blunt dissection technique, the right sided electrode connector was tunneled from this incision to the earlier incision, the location of the left sided DBS electrode connector location.  The right sided DBS electrode connector was slowly pulled until enough slack was available and remainder of the electrode was hidden under the scalp at the high parietal incision.    Attention was then turned to the left chest wall area.  Using a skin knife, a linear incision of approximately 6 cm was made over the left chest wall.  The incision was then further deepened using a combination of sharp and blunt dissection technique until we reached a tissue plane that was anterior/superficial to the pectoralis muscle.  This plane was not too deep from the skin.  Then, using a blunt dissection technique, a pocket was created in an inferior direction over the left chest wall.  The pocket was then generously irrigated with antibiotic solution and meticulous hemostasis was obtained.  No active bleeding was noted.  Dry sterile sponges were placed for hemostasis.    At this point, a tunneler was brought into the field.  We tunnel a passage from the head incision to the chest wall incision.  Care was taken to stay superficial and the path of the tunneler was confirmed to be over the clavicle.  The tunneler ware removed, leaving behind a plastic sheath.  Two extension wires were passed from the head incision to the chest incision.  Subsequently, the plastic sheath was pulled out from the chest incision, leaving behind the tunneled extension wires.     We then proceeded to make the connection between the intracranial leads and extension wires. The left side was done first.  The protective cover on the connector of the intracranial electrode was removed. Then the  connector was inserted into the extension wire that was tunneled and secured using fasting screws.  The right side was done next.  The protective cover on the connector of the intracranial electrode was removed. Then the connector was inserted into the extension wire that was tunneled and secured using fasting screws.      The extension wires were gently pulled down at the chest wall area in order to seat the extension wire connections correctly in the scalp pocket and not directly under the incision.  It was placed slightly superior to the incision.  The left side connection was anterior and the right side connection was posterior, in relation to each other.  At this point, the extension wires were connected to the Abbott Infinity 7 generator/battery which was brought onto the field and secured with the torque screwdriver.  The left side extension wire was inserted into the anterior portal, indicating that this is the left side.  The right side extension wire was inserted into the posterior portal, indicating that this is the right side.  The connections were then secured with the torque screwdriver.  The sponges were removed from the pocket and the pocket was inspected for hemostasis.  The excess wires were then curled behind the generator/battery, taking care not to cross the wires.  The generator/battery along with the wires were placed in the pocket.  The generator/battery was anchored within the pocket using two 2-0 Ethibond sutures.    The system was tested electrically.  All the impedances of the left and right side stimulating electrodes were within normal.  No problems were found with the system.    With the satisfactory placement of the new system, we began closing the wound.  The left chest incision was closed in the following manner.  The deep pocket was reapproximated using 3-0 Vicryl sutures in an inverted interrupted fashion.  The subcutaneous fat layer was reapproximated using 3-0 Vicryl sutures in  an inverted interrupted fashion.  The dermal layer was reapproximated using 3-0 Vicryl sutures in an inverted interrupted fashion.  The skin was reapproximated using 4-0 Monocryl suture in a subcuticular fashion.  The left lateral parietal incision and high parietal incision were irrigated with antibiotic solution and dried.  Meticulous hemostasis was obtained.  They were then closed in the following manner.  The galea was reapproximated over the implanted hardware using 3-0 Vicryl sutures in an inverted interrupted fashion.  This provided a protective layer.  The skin was then reapproximated using 4-0 Monocryl suture in a running fashion.  All the wounds were cleaned and dried.  ChloraPrep was used to clean the incisions again.  Then, Exofin skin glue was applied.    At the end of the case, all counts including needle, sponge and instrument counts were correct x 2.  There were no complications.  Patient was extubated and taken to the recovery room in a stable condition.    I, Adam Cardozo M.D., Ph.D., Neurosurgery Attending, was present and scrubbed for the entire case and performed the key and critical portions of the case.      ADAM CARDOZO MD, PHD

## 2019-07-25 ENCOUNTER — OFFICE VISIT (OUTPATIENT)
Dept: NEUROSURGERY | Facility: CLINIC | Age: 59
End: 2019-07-25
Payer: COMMERCIAL

## 2019-07-25 VITALS
DIASTOLIC BLOOD PRESSURE: 91 MMHG | BODY MASS INDEX: 34.45 KG/M2 | RESPIRATION RATE: 18 BRPM | SYSTOLIC BLOOD PRESSURE: 122 MMHG | HEART RATE: 92 BPM | TEMPERATURE: 98 F | OXYGEN SATURATION: 96 % | HEIGHT: 68 IN | WEIGHT: 227.3 LBS

## 2019-07-25 DIAGNOSIS — Z96.89 STATUS POST DEEP BRAIN STIMULATOR PLACEMENT: ICD-10-CM

## 2019-07-25 DIAGNOSIS — D68.00 VON WILLEBRAND'S DISEASE (H): ICD-10-CM

## 2019-07-25 DIAGNOSIS — G20.A1 PARKINSON'S DISEASE (H): ICD-10-CM

## 2019-07-25 DIAGNOSIS — Z48.89 ENCOUNTER FOR POSTOPERATIVE WOUND CHECK: ICD-10-CM

## 2019-07-25 ASSESSMENT — MIFFLIN-ST. JEOR: SCORE: 1838.53

## 2019-07-25 ASSESSMENT — PAIN SCALES - GENERAL: PAINLEVEL: NO PAIN (0)

## 2019-07-25 NOTE — LETTER
7/25/2019       RE: Dion Villavicencio  [address] [facility] [address]     Dear Colleague,    Thank you for referring your patient, Dion Villavicencio, to the [facility] NEUROSURGERY at [facility]. Please see a copy of my visit note below.    NEUROSURGERY PROGRESS NOTE    REASON FOR VISIT: Routine postop wound check and suture/staple removal.    DATE OF PROCEDURE #2:  07/11/2019  PREOPERATIVE DIAGNOSIS:    1.  Parkinson s disease  2.  Bilateral tremor  3.  Von Willebrand s disease    4.  S/p bilateral deep brain stimulator placement, phase I, placement of bilateral deep brain stimulator electrodes, target bilateral subthalamic nucleus, with microelectrode recording on 7/9/2019  PROCEDURES PERFORMED:  1.  Deep brain stimulator placement, phase II, placement of new deep brain stimulator generator/battery over the left chest wall.  2.  Placement of two extension wires and connection of the left and right side deep brain stimulator electrodes, two electrode arrays, to the new generator/battery.  3.  Electrical analysis of the deep brain stimulator system.  IMPLANTS:   1.  Abbott DBS extension wire, [id number], S/N [id number] for the left side.  2.  Abbott DBS extension wire, [id number], S/N [id number] for the right side.  3.  Abbott DBS generator/battery, Infinity 7, [id number], S/N CCS364.1  DATE OF PROCEDURE #1:  07/09/2019  PREOPERATIVE DIAGNOSIS:    1.  Parkinson s disease  2.  Bilateral tremor  3.  Von Willebrand s disease    PROCEDURES PERFORMED:   1.  Placement of CRW stereotactic headframe.   2.  Stereotactic neuronavigation planning and CT of head.   3.  Stereotactic neuronavigation using the Mintedalth system for surgical planning, targeting and approach. The target is left and right subthalamic nucleus (STN).   4.  Left side deep brain stimulator placement, phase I, placement of left side deep brain stimulator electrode, target is left subthalamic nucleus (STN).   5.  Right side deep brain  "stimulator placement, phase I, placement of right side deep brain stimulator electrode, target is right subthalamic nucleus (STN).  6.  Use of intraoperative microelectrode recording.  7.  Use of intraoperative fluoroscopy.   SURGEON:  Sidney Cardozo MD, PhD   FINDINGS:  Bilateral subthalamic nucleus electrodes.  Left side electrode location, medial Davonte Gun position, 0.71 mm below target.  Right side electrode location, center Davonte Gun position, 0.23 mm below target.  All impedance values within normal.  Wires tunneled to the left side.  IMPLANTS:  1.  Abbott Guardian jada hole cover, REF 6010.  Left side Lot# [id number], Right side Lot# [id number].  2.  Pratt DBS electrode, Infinity 0.5, REF 6172.  Left side S/N [id number], Right side S/N [id number].  SURGEON:  Sidney Cardozo MD, PhD       HPI: Mr. Dion Villavicencio is a 58-year-old right-handed male with a PMH significant for von Willebrand's Type I and Parkinson's disease (PD). The latter was diagnosed in 2006.  He has a right hand tremor which is worse with typing. This progressed, and the patient noticed that his tremors are worse and had spread to both feet and his left hand.  He experienced difficulty with eating and buttoning his clothes but can still complete these tasks without assistance.  He also reported being unsteady when standing with his eyes closed.  No falls have been reported.  He felt that his social life was limited due to having to coordinate his medications and meals. He was seen  by Dr. MAUREEN Cardozo for discussion of his DBS with the goal to control his shaking in his hands and both of his feet.  Additionally, he wanted to live a more \"normal life\" while using less medication.   He completed his DBS workup and his case was discussed at the Movement Disorder Consensus Group Meeting.  He was considered to be a good candidate in terms of DBS, bilateral candidate using Abbott system. In preparation for surgery,  he was evaluated by Dr. Ma of " Hematology/Oncology.  Preoperative Hematology visit laid out a perioperative transfusion treatment to lower his risk of bleeding. The patient was medically cleared to undergo the above mentioned procedure on 7/9/2019 and 7/11/2019 without complications. He recovered well and was discharged home on POD 4 in good condition.  Since then he presents for routine wound check.    Interval History: He denies fever, chills, redness, and/or drainage from incisions except for mild swelling around the left battery site under the left clavicle. He also notices intermittent slurred speech and word finding difficulties and possibly worsening tremors since the surgery. He has an appointment for DBS programming in our Neurology in a week. He sees an outside neurologist for medical management of his PD.    STATUS REPORT  Patient Supplied Answers To the  Pain Questionnaire  [facility] -  Patient Entered Questionnaire/Answers 7/25/2019   What number best describes your pain right now:  0 = No pain  to  10 = Worst pain imaginable 0       CURRENT MEDICATIONS:    Current Outpatient Medications:      amantadine (SYMMETREL) 100 MG capsule, Take 100 mg by mouth daily (with lunch) 100mg capsule by mouth daily @ 11am-1230pm, Disp: 90 capsule, Rfl: 3     carbidopa-levodopa (SINEMET)  MG per tablet, Take 1 tablet by mouth 4 times daily 25/100 tab by mouth @ 7-830am, 1pm and 11pm-1230am and extra if needed = 4/day, Disp: 360 tablet, Rfl: 3     carbidopa-levodopa ER (RYTARY) 36. MG CPCR per CR capsule, Take 2-4 capsules by mouth 5 times daily 4 capsules @ 7-830am, 11am-1230p and 3-430pm; and 2 capsules @ 7-830pm and 11pm-1230am, Disp: , Rfl:      Cholecalciferol (VITAMIN D) 2000 units tablet, Take 2,000 Units by mouth every morning 2000 units by mouth daily @ 7-830am, Disp: 100 tablet, Rfl: 3     ibuprofen (ADVIL/MOTRIN) 200 MG tablet, Take 200 mg by mouth every 4 hours as needed for mild pain, Disp: , Rfl:      Multiple  Vitamins-Minerals (CENTRUM SILVER) per tablet, Take 1 tablet by mouth every morning Centrum vitamin by mouth daily @ 7-830am, Disp: 30 tablet, Rfl:      rOPINIRole (REQUIP) 2 MG tablet, Take 2 mg by mouth 3 times daily 2mg tab by mouth @ 7-830am, 3-430pm and 11pm-1230am, Disp: 270 tablet, Rfl: 3     oxyCODONE (ROXICODONE) 5 MG tablet, Take 1-2 tablets (5-10 mg) by mouth every 6 hours as needed for severe pain (Patient not taking: Reported on 7/15/2019), Disp: 10 tablet, Rfl: 0    ALLERGIES:  Allergies   Allergen Reactions     Penicillins      Hives     Pramipexole      Leg swelling and compulsive behavior       PMH, SOC HIST, FAM HIST, PROBLEM LIST:  All reviewed in EPIC.    FOCUS EXAMINATION:  There were no vitals taken for this visit.  General:Well developed well nourished male in no apparent distress. SHe is accompanied.    LOC/Cognition: He is A&O X3.  Mood and affect WNL. Language and fund of knowledge intact.  Is able to sit and rise independently.     Neuro exam was deferred for this visit.  Incision: Approximated by (dissolvable) sutures/ staples. No erythema, swelling, induration, and/or drainage.      ASSESSMENT:  1. Parkinson's disease (H)    2. Von Willebrand's disease (H)    3. Status post deep brain stimulator placement    4. Encounter for postoperative wound check      Dion Villavicencio is doing reasonably well.The wound is healthy without evidence of infeciton.    PLAN:  We discussed wound care.  *  Keep it open to air.  *  May swim or bathe when all scabbing is gone from the incision.  *  Call our services if you develop fever, chills, redness, swelling, and/or drainage from incision.  We discussed activities  *  We recommend he continue the current activity restrictions until he sees Neurology for DBS programming.  *  We recommend she return to normal daily activities as able.  *  He can return to driving if he is comfortable but with a passenger next to him for the first couple of trips.   We discussed  follow up    *  Recommend the patient to discuss with Neurology if his tremors and/or speech problem continue to persist or gets worse on his next follow up appointment.  *  We are comfortable releasing him to PRN follow up.  We have not made a specific return appointment but will be happy to see * again should the need arise.    All the patient's questions have been answered and they demonstrate good understanding of the above.  The patient has our contact information and is aware that he should call if he has questions or concerns.     We appreciate the opportunity to be of service in the care of this pleasant patient.  Please do call if there is anything more we can do.    This note was completed using Dragon voice recognition software.  Although reviewed after completion, some word and grammatical errors may occur.    Esperanza Sheehan, CHRISTEN, APRN, FNP-BC  Department of Neurosurgery

## 2019-07-25 NOTE — PROGRESS NOTES
NEUROSURGERY PROGRESS NOTE      REASON FOR VISIT: Routine postop wound check and suture/staple removal.      DATE OF PROCEDURE #2:  07/11/2019  PREOPERATIVE DIAGNOSIS:    1.  Parkinson s disease  2.  Bilateral tremor  3.  Von Willebrand s disease    4.  S/p bilateral deep brain stimulator placement, phase I, placement of bilateral deep brain stimulator electrodes, target bilateral subthalamic nucleus, with microelectrode recording on 7/9/2019  PROCEDURES PERFORMED:  1.  Deep brain stimulator placement, phase II, placement of new deep brain stimulator generator/battery over the left chest wall.  2.  Placement of two extension wires and connection of the left and right side deep brain stimulator electrodes, two electrode arrays, to the new generator/battery.  3.  Electrical analysis of the deep brain stimulator system.  IMPLANTS:   1.  Abbott DBS extension wire, [id number], S/N [id number] for the left side.  2.  Abbott DBS extension wire, [id number], S/N [id number] for the right side.  3.  Abbott DBS generator/battery, Infinity 7, [id number], S/N [id number]  DATE OF PROCEDURE #1:  07/09/2019  PREOPERATIVE DIAGNOSIS:    1.  Parkinson s disease  2.  Bilateral tremor  3.  Von Willebrand s disease    PROCEDURES PERFORMED:   1.  Placement of CRW stereotactic headframe.   2.  Stereotactic neuronavigation planning and CT of head.   3.  Stereotactic neuronavigation using the Yolto system for surgical planning, targeting and approach. The target is left and right subthalamic nucleus (STN).   4.  Left side deep brain stimulator placement, phase I, placement of left side deep brain stimulator electrode, target is left subthalamic nucleus (STN).   5.  Right side deep brain stimulator placement, phase I, placement of right side deep brain stimulator electrode, target is right subthalamic nucleus (STN).  6.  Use of intraoperative microelectrode recording.  7.  Use of intraoperative fluoroscopy.   SURGEON:  Sidney Cardozo MD, PhD  "  FINDINGS:  Bilateral subthalamic nucleus electrodes.  Left side electrode location, medial Davonte Gun position, 0.71 mm below target.  Right side electrode location, center Davonte Gun position, 0.23 mm below target.  All impedance values within normal.  Wires tunneled to the left side.  IMPLANTS:  1.  Abbott Guardian jada hole cover, REF 6010.  Left side Lot# [id number], Right side Lot# [id number].  2.  Pratt DBS electrode, Infinity 0.5, REF 6172.  Left side S/N [id number], Right side S/N [id number].  SURGEON:  Sidney Cardozo MD, PhD       HPI: Mr. Dion Villavicencio is a 58-year-old right-handed male with a PMH significant for von Willebrand's Type I and Parkinson's disease (PD). The latter was diagnosed in 2006.  He has a right hand tremor which is worse with typing. This progressed, and the patient noticed that his tremors are worse and had spread to both feet and his left hand.  He experienced difficulty with eating and buttoning his clothes but can still complete these tasks without assistance.  He also reported being unsteady when standing with his eyes closed.  No falls have been reported.  He felt that his social life was limited due to having to coordinate his medications and meals. He was seen by Dr. MAUREEN Cardozo for discussion of his DBS with the goal to control his shaking in his hands and both of his feet.  Additionally, he wanted to live a more \"normal life\" while using less medication.   He completed his DBS workup and his case was discussed at the Movement Disorder Consensus Group Meeting.  He was considered to be a good candidate in terms of DBS, bilateral candidate using Abbott system. In preparation for surgery,  he was evaluated by Dr. Ma of Hematology/Oncology.  Preoperative Hematology visit laid out a perioperative transfusion treatment to lower his risk of bleeding. The patient was medically cleared to undergo the above mentioned procedure on 7/9/2019 and 7/11/2019 without complications. He recovered well and was " discharged home on POD 4 in good condition.  Since then he presents for routine wound check.    Interval History: He denies fever, chills, redness, and/or drainage from incisions except for mild swelling around the left battery site under the left clavicle. He also notices intermittent slurred speech and word finding difficulties and possibly worsening tremors since the surgery. He has an appointment for DBS programming in our Neurology in a week. He sees an outside neurologist for medical management of his PD.      STATUS REPORT  Patient Supplied Answers To the  Pain Questionnaire  [facility] Pain -  Patient Entered Questionnaire/Answers 7/25/2019   What number best describes your pain right now:  0 = No pain  to  10 = Worst pain imaginable 0         CURRENT MEDICATIONS:    Current Outpatient Medications:      amantadine (SYMMETREL) 100 MG capsule, Take 100 mg by mouth daily (with lunch) 100mg capsule by mouth daily @ 11am-1230pm, Disp: 90 capsule, Rfl: 3     carbidopa-levodopa (SINEMET)  MG per tablet, Take 1 tablet by mouth 4 times daily 25/100 tab by mouth @ 7-830am, 1pm and 11pm-1230am and extra if needed = 4/day, Disp: 360 tablet, Rfl: 3     carbidopa-levodopa ER (RYTARY) 36. MG CPCR per CR capsule, Take 2-4 capsules by mouth 5 times daily 4 capsules @ 7-830am, 11am-1230p and 3-430pm; and 2 capsules @ 7-830pm and 11pm-1230am, Disp: , Rfl:      Cholecalciferol (VITAMIN D) 2000 units tablet, Take 2,000 Units by mouth every morning 2000 units by mouth daily @ 7-830am, Disp: 100 tablet, Rfl: 3     ibuprofen (ADVIL/MOTRIN) 200 MG tablet, Take 200 mg by mouth every 4 hours as needed for mild pain, Disp: , Rfl:      Multiple Vitamins-Minerals (CENTRUM SILVER) per tablet, Take 1 tablet by mouth every morning Centrum vitamin by mouth daily @ 7-830am, Disp: 30 tablet, Rfl:      rOPINIRole (REQUIP) 2 MG tablet, Take 2 mg by mouth 3 times daily 2mg tab by mouth @ 7-830am, 3-430pm and 11pm-1230am, Disp: 270 tablet,  Rfl: 3     oxyCODONE (ROXICODONE) 5 MG tablet, Take 1-2 tablets (5-10 mg) by mouth every 6 hours as needed for severe pain (Patient not taking: Reported on 7/15/2019), Disp: 10 tablet, Rfl: 0      ALLERGIES:  Allergies   Allergen Reactions     Penicillins      Hives     Pramipexole      Leg swelling and compulsive behavior       PMH, SOC HIST, FAM HIST, PROBLEM LIST:  All reviewed in EPIC.      FOCUS EXAMINATION:  There were no vitals taken for this visit.  General:Well developed well nourished male in no apparent distress. SHe is accompanied.    LOC/Cognition: He is A&O X3.  Mood and affect WNL. Language and fund of knowledge intact.  Is able to sit and rise independently.     Neuro exam was deferred for this visit.  Incision: Approximated by (dissolvable) sutures/ staples. No erythema, swelling, induration, and/or drainage.        ASSESSMENT:  1. Parkinson's disease (H)    2. Von Willebrand's disease (H)    3. Status post deep brain stimulator placement    4. Encounter for postoperative wound check      Dion Villavicencio is doing reasonably well.The wound is healthy without evidence of infeciton.      PLAN:  We discussed wound care.  *  Keep it open to air.  *  May swim or bathe when all scabbing is gone from the incision.  *  Call our services if you develop fever, chills, redness, swelling, and/or drainage from incision.  We discussed activities  *  We recommend he continue the current activity restrictions until he sees Neurology for DBS programming.  *  We recommend she return to normal daily activities as able.  *  He can return to driving if he is comfortable but with a passenger next to him for the first couple of trips.   We discussed follow up    *  Recommend the patient to discuss with Neurology if his tremors and/or speech problem continue to persist or gets worse on his next follow up appointment.  *  We are comfortable releasing him to PRN follow up.  We have not made a specific return appointment but will  be happy to see * again should the need arise.    All the patient's questions have been answered and they demonstrate good understanding of the above.  The patient has our contact information and is aware that he should call if he has questions or concerns.     We appreciate the opportunity to be of service in the care of this pleasant patient.  Please do call if there is anything more we can do.    This note was completed using Dragon voice recognition software.  Although reviewed after completion, some word and grammatical errors may occur.      Esperanza Sheehan DNP, APRN, FNP-BC  Department of Neurosurgery

## 2019-07-29 ASSESSMENT — MOVEMENT DISORDERS SOCIETY - UNIFIED PARKINSONS DISEASE RATING SCALE (MDS-UPDRS)
GETTING_OUT_OF_BED_CAR_DEEP_CHAIR: SLIGHT: I AM SLOW OR AWKWARD, BUT I USUALLY CAN DO IT ON MY FIRST TRY.
WALKING_AND_BALANCE: SLIGHT: I AM SLIGHTLY SLOW OR MAY DRAG A LEG.  I NEVER USE A WALKING AID.
HOBBIES_AND_OTHER_ACTIVITIES: SLIGHT: I AM A BIT SLOW BUT DO THESE ACTIVITIES EASILY.
CHEWING_AND_SWALLOWING: NORMAL: NO PROBLEMS.
TREMOR: SLIGHT: SHAKING OR TREMOR OCCURS BUT DOES NOT CAUSE PROBLEMS WITH ANY ACTIVITIES.
TOTAL_SCORE: 11
HYGIENE: SLIGHT:  I AM SLOW BUT I DO NOT NEED ANY HELP.
HANDWRITING: SLIGHT: MY WRITING IS SLOW, CLUMSY OR UNEVEN, BUT ALL WORDS ARE CLEAR.
SPEECH: MILD: MY SPEECH CAUSES PEOPLE TO ASK ME TO OCCASIONALLY REPEAT MYSELF, BUT NOT EVERYDAY.
TURNING_IN_BED: SLIGHT: I HAVE A BIT OF TROUBLE TURNING, BUT I DO NOT NEED ANY HELP.
FREEZING: SLIGHTLY: I BRIEFLY FREEZE BUT I CAN EASILY START WALKING AGAIN. I DO NOT NEED HELP FROM SOMEONE ELSE OR A WALKING AID (CANE OR WALKER) BECAUSE OF FREEZING.
EATING_TASKS: NORMAL: NOT AT ALL (NO PROBLEMS).
DRESSING: SLIGHT: I AM SLOW BUT I DO NOT NEED HELP.
SALIVA_AND_DROOLING: NORMAL: NOT AT ALL (NO PROBLEMS).

## 2019-08-06 ENCOUNTER — OFFICE VISIT (OUTPATIENT)
Dept: NEUROLOGY | Facility: CLINIC | Age: 59
End: 2019-08-06
Payer: COMMERCIAL

## 2019-08-06 VITALS
BODY MASS INDEX: 34.25 KG/M2 | HEART RATE: 94 BPM | SYSTOLIC BLOOD PRESSURE: 144 MMHG | WEIGHT: 226 LBS | DIASTOLIC BLOOD PRESSURE: 95 MMHG | OXYGEN SATURATION: 95 % | TEMPERATURE: 98 F | HEIGHT: 68 IN | RESPIRATION RATE: 16 BRPM

## 2019-08-06 DIAGNOSIS — G20.A1 PARKINSON'S DISEASE (H): ICD-10-CM

## 2019-08-06 DIAGNOSIS — Z96.89 S/P DEEP BRAIN STIMULATOR PLACEMENT: Primary | ICD-10-CM

## 2019-08-06 ASSESSMENT — UNIFIED PARKINSONS DISEASE RATING SCALE (UPDRS)
RIGIDITY_RUE: MILD: RIGIDITY DETECTED WITHOUT THE ACTIVATION MANEUVER.  FULL RANGE OF MOTION IS EASILY ACHIEVED.
AMPLITUDE_LIP_JAW: NORMAL: NO TREMOR.
AXIAL_SCORE: 9
POSTURAL_STABILITY: NORMAL:  RECOVERS WITH ONE OR TWO STEPS.
FACIAL_EXPRESSION: MODERATE: MASKED FACIES WITH LIPS PARTED SOME OF THE TIME WHEN THE MOUTH IS AT REST.
FINGER_TAPPING_RIGHT: SLIGHT: ANY OF THE FOLLOWING: A) THE REGULAR RHYTHM IS BROKEN WITH ONE WITH ONE OR TWO INTERRUPTIONS OR HESITATIONS OF THE MOVEMENT B) SLIGHT SLOWING C) THE AMPLITUDE DECREMENTS NEAR THE END OF THE 10 MOVEMENTS.
SPONTANEITY_OF_MOVEMENT: 1: SLIGHT: SLIGHT GLOBAL SLOWNESS AND POVERTY OF SPONTANEOUS MOVEMENTS.
CONSTANCY_TREMOR_ATREST: SEVERE: TREMOR AT REST IS PRESENT > 75% OF THE ENTIRE EXAMINATION PERIOD.
RIGIDITY_NECK: MILD: RIGIDITY DETECTED WITHOUT THE ACTIVATION MANEUVER.  FULL RANGE OF MOTION IS EASILY ACHIEVED.
AMPLITUDE_LLE: MILD > 1 CM BUT < 3 CM IN MAXIMAL AMPLITUDE.
RIGIDITY_LLE: MILD: RIGIDITY DETECTED WITHOUT THE ACTIVATION MANEUVER.  FULL RANGE OF MOTION IS EASILY ACHIEVED.
FREEZING_GAIT: NORMAL
AMPLITUDE_RLE: MILD > 1 CM BUT < 3 CM IN MAXIMAL AMPLITUDE.
RIGIDITY_RLE: MILD: RIGIDITY DETECTED WITHOUT THE ACTIVATION MANEUVER.  FULL RANGE OF MOTION IS EASILY ACHIEVED.
POSTURE: 2 MILD.  DEFINITE FLEXION, SCOLIOSIS OR LEANING OT ONE SIDE, BUT CAN CORRECT POSTURE TO NORMAL WHEN ASKED.
AMPLITUDE_RUE: MILD > 1 CM BUT < 3 CM IN MAXIMAL AMPLITUDE.
PRONATION_SUPINATION_LEFT: SLIGHT: ANY OF THE FOLLOWING: A) THE REGULAR RHYTHM IS BROKEN WITH ONE WITH ONE OR TWO INTERRUPTIONS OR HESITATIONS OF THE MOVEMENT B) SLIGHT SLOWING C) THE AMPLITUDE DECREMENTS NEAR THE END OF THE 10 MOVEMENTS.
PARKINSONS_MEDS: OFF
SPEECH: SLIGHT: LOSS OF MODULATION, DICTION OR VOLUME, BUT STILL ALL WORDS EASY TO UNDERSTAND.
TOETAPPING_LEFT: MODERATE: ANY OF THE FOLLOWING:  A) MORE THAN 5 INTERRUPTIONS OR AT LEAST ONE LONGER ARREST (FREEZE) IN ONGOING MOVEMENT  B) MODERATE SLOWING C) THE AMPLITUDE DECREMENTS STARTING AFTER THE FIRST MOVEMENT.
HANDMOVEMENTS_LEFT: SLIGHT: ANY OF THE FOLLOWING: A) THE REGULAR RHYTHM IS BROKEN WITH ONE WITH ONE OR TWO INTERRUPTIONS OR HESITATIONS OF THE MOVEMENT B) SLIGHT SLOWING C) THE AMPLITUDE DECREMENTS NEAR THE END OF THE 10 MOVEMENTS.
TOTAL_SCORE: 43
RIGIDITY_LUE: MILD: RIGIDITY DETECTED WITHOUT THE ACTIVATION MANEUVER.  FULL RANGE OF MOTION IS EASILY ACHIEVED.
FINGER_TAPPING_LEFT: SLIGHT: ANY OF THE FOLLOWING: A) THE REGULAR RHYTHM IS BROKEN WITH ONE WITH ONE OR TWO INTERRUPTIONS OR HESITATIONS OF THE MOVEMENT B) SLIGHT SLOWING C) THE AMPLITUDE DECREMENTS NEAR THE END OF THE 10 MOVEMENTS.
GAIT: NORMAL
AMPLITUDE_LUE: NORMAL: NO TREMOR.
LEG_AGILITY_RIGHT: SLIGHT: ANY OF THE FOLLOWING: A) THE REGULAR RHYTHM IS BROKEN WITH ONE WITH ONE OR TWO INTERRUPTIONS OR HESITATIONS OF THE MOVEMENT B) SLIGHT SLOWING C) THE AMPLITUDE DECREMENTS NEAR THE END OF THE 10 MOVEMENTS.
TOTAL_SCORE: 16
PRONATION_SUPINATION_RIGHT: SLIGHT: ANY OF THE FOLLOWING: A) THE REGULAR RHYTHM IS BROKEN WITH ONE WITH ONE OR TWO INTERRUPTIONS OR HESITATIONS OF THE MOVEMENT B) SLIGHT SLOWING C) THE AMPLITUDE DECREMENTS NEAR THE END OF THE 10 MOVEMENTS.
HANDMOVEMENTS_RIGHT: SLIGHT: ANY OF THE FOLLOWING: A) THE REGULAR RHYTHM IS BROKEN WITH ONE WITH ONE OR TWO INTERRUPTIONS OR HESITATIONS OF THE MOVEMENT B) SLIGHT SLOWING C) THE AMPLITUDE DECREMENTS NEAR THE END OF THE 10 MOVEMENTS.
ARISING_CHAIR: NORMAL: ABLE TO ARISE QUICKLY WITHOUT HESITATION.
LEG_AGILITY_LEFT: SLIGHT: ANY OF THE FOLLOWING: A) THE REGULAR RHYTHM IS BROKEN WITH ONE WITH ONE OR TWO INTERRUPTIONS OR HESITATIONS OF THE MOVEMENT B) SLIGHT SLOWING C) THE AMPLITUDE DECREMENTS NEAR THE END OF THE 10 MOVEMENTS.
TOTAL_SCORE_LEFT: 14
TOETAPPING_RIGHT: MILD: ANY OF THE FOLLOWING: A) 3 TO 5 INTERRUPTIONS DURING TAPPING B) MILD SLOWING C) THE AMPLITUDE DECREMENTS MIDWAY IN THE 10-MOVEMENT SEQUENCE

## 2019-08-06 ASSESSMENT — MIFFLIN-ST. JEOR: SCORE: 1832.65

## 2019-08-06 ASSESSMENT — PAIN SCALES - GENERAL: PAINLEVEL: NO PAIN (0)

## 2019-08-06 NOTE — PATIENT INSTRUCTIONS
August 6, 2019    Dear  Dion CHAPARRO Saud,    Thank you for coming today.  During your visit, we have discussed the following:     __  Your DBS electrical system function (impedance) is normal. The battery life is also good.    __  The Left brain (Right body) DBS has been turned ON and programmed.    __  Stop the short acting Sinemet 25/100 mg and Reduce Rytary 145 mg from 4 to 3 capsules.    PD Medications 7:30am  11:30am  3:30pm  7:30pm  11:30pm   Ropinirole 2 mg   1    1    1   Rytary 145 mg  3  3  3   3  3   Sinemet 25/100 mg  1 tab qid prn  Stop        Stop   Amantadine 100 mg    1            __  Please call if your symptoms worsen or you experience side effects.    __  Return on 8/16 - for the right brain (left body) Monopolar Review and Initial programming.   Come OFF medication.     To contact our clinic, you may call [phone] or use SwitchNote message.     It's good to see you!     DIETER Bacon, CNP  [facility]

## 2019-08-06 NOTE — PROGRESS NOTES
"Bilateral STN    Left STN  X: -11.83mm, Y:-4.01mm, Z:-4.90mm, AP:61.7deg, MSP11.0deg    Pen 1, track 1:  anterior hole  Unremarkable thalamus  STN from possibly +4.21, probably 4.05, bottom at 3.39  Microstim at -1.079:  noeeffect to 90 uA    Pen 1 track 2 center hole  STN from +5.87, with multiple proprioceptive fields, all in the UE  Bottom at +0.22, with high frequency regular below.  Microstim at -1.079:  noeeffect to 90 uA    Pen 1 track 3 lateral hole, nquiet, no STN, though question poorly isolated multiunit activity at +3.29, SNR from -0.11  microstim at -1.079:  tongue contraction at 30 uA  microstim at +0.445 tongue contraction at 50 uA    Pen 2 track 1 medial hole  STN from +4.8 through at least -0.399, possibly -1.138  question high frequency regular below    microstim at -23.028 gave no effect up to 90 uA    Abbott Infinity 0.5 placed at depth -0.71  130 Hz, 60 uA  [id number]  improved rigidity from 1 to 3 mA  improved bradykinesia from 1 to 3 mA  paresthesias from 2, transient to 4.5 or 5, mainly in UE    Right STN  X\" +12.0mm, Y:-4.0mm, Z:-4.01mm, AP: 57.5deg, MSP: 15.0 degs    Pen 1 track 1: anterior hole  Sparse thalamus, question STN at +1.98, quesiton STN at +1.70, otherwise  no STN;  SNr from +0.52    Microstim at depth -2.008 gave no effect up to 90 uA    Pen 1 track 2: center hole  Unremarkable thalamus, STN from +5.29, proprioceptive (shoulder) field at that depth.  Bottom at -0.27, with SNr below    Microstim at depth -2.008 gave no effect up to 90 uA    Pen 1 track 3: lateral hole  STN from +6.68  to +1.70, proprioceptive fields (shoulder, tongue) with possible SNr below.    Microstim at depth -2.008 gave no effect up to 90 uA      Pen 2 track 1 posterior hole  STN  from +3.2 to +1.1, possibly as far as -0.85, with possible SNr below    microstim at depth -2.545 gave no effect to 90 uA    Abbott Infinity 0.5 at depth of -0.23  130 Hz, 60 usec  [id number]  presthesias (UE) from 1.0 mA, tranisnet " up to  5.0 mA  tremor  reduction at 3.0, nmo impairment of finger tapping at 4.0.      Mapping 2.5 h  Programming 0.5h

## 2019-08-06 NOTE — PROGRESS NOTES
"MOVEMENT DISORDERS CLINIC    PATIENT: Dion Villavicencio    : 1960    MARY: 2019    REASON FOR VISIT: Left hemisphere monopolar review & initial deep brain stimulation (DBS) programming for Parkinson's disease.     HPI:  Mr. Dion Villavicencio is a 58 year old right - handed  male who came to the [facility] neurology clinic accompanied by his wife for DBS initial programming.  He underwent Bilateral Subthalamic Nucleus (STN) DBS lead placement on 2019 and left chest infraclavicular Infinity 7 generator placement on 2019 by Dr. Cardozo.     Lesion effect:  \"No.\"  He reports \"in fact, I had more left hand tremor than I had before,\" which was unusual as he normally didn't have much tremors in Lt hand.     Side effect post DBS lead placement:  For the first 3 days, he had slight slurred speech and slight memory trouble with word finding, which resolved after 3 days.  No gait or mood problems.       PD Medications 7:30am  11:30am  3:30pm  7:30pm  11:30pm   Ropinirole 2 mg   1    1    1   Rytary 145 mg  4  4  4   2   2   Sinemet 25/100 mg  1 tab qid prn  1        2   Amantadine 100 mg    1               Last antiparkinsonian dose taken:  Amantadine 2019 at 12 pm  Ropinirole, Rytary, & Sinemet -- last night 2019 at 7 pm      PHYSICAL EXAM:  Vital Signs:  Blood pressure (!) 144/95, pulse 94, temperature 98  F (36.7  C), temperature source Oral, resp. rate 16, height 1.732 m (5' 8.19\"), weight 102.5 kg (226 lb), SpO2 95 %. Body mass index is 34.17 kg/m .    General:  Mr. Villavicencio is a pleasant  male who is well-groomed, well-developed and overweight sitting comfortably in the exam room without any distress.  Affect is appropriate.    Incision Site:  3 sites on scalp with bilateral DBS lead insertion sites and extension wire behind left ear and left chest incisions have healed nicely.  No swelling, drainage, or redness.     MDS Part II  -- Over the last week -- 0: Normal -- 1: Slight -- 2: Mild -- 3: " Moderate -- 4: Severe     2.1 Speech: 2   2.2 Saliva and droolin   2.3 Chewing and swallowin   2.4 Eating tasks: 0   2.5 Dressin   2.6 Hygiene:  1   2.7 Handwritin   2.8 Doing hobbies and other activities:  1   2.9 Turning in bed:  1   2.10 Tremor:  1   2.11 Getting out of bed/car/deep chair:   1   2.12 Walking and balance: 1   2.13 Freezin     Total:    11         MOVEMENT DISORDERS ASSESSMENT:  UPDRS 3   UPDRS Values 2019   Time: 7:25 AM   Medication Off   R Brain DBS: Off   L Brain DBS: Off   Speech 1   Facial Expression 3   Rigidity Neck 2   Rigidity RUE 2   Rigidity LUE 2   Rigidity RLE 2   Rigidity LLE 2   Finger Taps R 1   Finger Taps L 1   Hand Mvt R 1   Hand Mvt L 1   Pron-/Supinate R 1   Pron-/Supinate L 1   Toe Tap R 2   Toe Tap L 3   Leg Agility R 1   Leg Agility L 1   Arise From Chair 0   Gait 0   Gait Freezing 0   Postural Stability 0   Posture 2   Global Spont Mvt 1   Postural Tremor RUE 1   Postural Tremor LUE 0   Kinetic Tremor RUE 1   Kinetic Tremor LUE 1   Rest Tremor RUE 2   Rest Tremor LUE 0   Rest Tremor RLE 2   Rest Tremor LLE 2   Rest Tremor Lip/Jaw 0   Rest Tremor Constancy 4   Total Right 16   Total Left 14   Axial Total 9   Total 43     DBS Interrogation & Analysis:    Implanted generator:  Left chest Infinity 7   Lead placement:  Bilateral Subthalamic Nucleus (STN)  Lead placement date:  2019  Generator placement date:  2019    Battery Service Life:  >3.00 volts  Left Brain  Electrode Impedance Check:   Left Lead: No Problems Found.       Right Brain   Electrode Impedance Check:  Right Lead: No Problems Found.    See scanned report for impedance details.      MONOPOLAR REVIEW  Left Hemisphere   Contacts Amplitude PW   msec Rate  hertz Assessment Adverse Effect   Case +, 1 - 0.0 60 130 RUE rest tremor = 2; RLE = 2. Rigidity in RUE = 2; RLE = 2.     0.5   No change.      1.0   Rest tremor in both RUE & RLE subsided and slight.     1.5   Same as above.   "Rigidity = 1.     2.0   Tremor got worse.      2.5   No change in tremors.  Still worse.  Rigidity in RUE & RLE = 1 Right hand transient slight  \"electrical pulsation.\"     3.0   No change in tremors.  Slight dysarthria.  Slight persistent tingling in Rt pinky finger.    3.5    Heaviness in Rt hand.  Hard to make a fist.  Mild persistent dysarthria.   Case+, 2abc- 0.0 60 130 RUE rest tremor = 2; RLE = 2. Rigidity in RUE = 1; RLE = 2.     0.5   No change.      1.0   No change.      1.5   RUE rest tremor = 1; RLE = 2. Rigidity in RUE & RLE = 1.     2.0   RUE rest tremor = 0; RLE = 1. Rigidity in RUE = 0; RLE = 1.     2.5   Rare tremor that only comes with mental activation.       3.0   Same as above.      3.5   Slight to mild tremor in Rt hand recurred.  Rt hand slight transient tingling.     4.0    Persistent dysarthria. Rt hand persistent tingling.   Case+, 3abc- 0.0 60 130 RUE rest tremor = 2; RLE = 2. Rigidity in RUE = 2; RLE = 2.     0.5   No change.      1.0   No change.      1.5   RUE rest tremor = 1; RLE = 1. Rigidity in RUE = 0; RLE = 1.     2.0   No tremor in both RUE & RLE.  Rigidity in RUE = 0; RLE = 1.      2.5   No change.      3.0   No tremors & no rigidity in Rt body.      3.5   No change.      4.0   No change.      4.5   No change.      5.0    Mild dysarthria.    Case+, 4- 0.0 60 130 RUE rest tremor = 2; RLE = 2. Rigidity in RUE = 2; RLE = 2.     0.5   No change.      1.0   RUE rest tremor = 2; RLE = 1. Rigidity in RUE = 2; RLE = 2.     1.5   RUE rest tremor = 0; RLE = 2. Rigidity in RUE = 0; RLE = 1.      2.0   No change.      2.5   No change.      3.0   Tremor in RLE got much worse.  Still no tremor in RUE.      3.5   No change.  Pt started sweating and feeling hot.        __  Monopolar survey showed good tremor suppression and improvement in rigidity in the 2 middle contacts.  Since the therapeutic window in contact 3 was wider, it was selected for final programming as below: -       Left STN    C " +, 3abc -  Amplitude:  2.5 mA  PW:  60 msec  Rate:  130 Hz    Therapy impedance:  1200 Ohms       Patient :  Upper Limit: 2.50 mA  Lower Limit: 0.00 mA         __  After final programming, he continued having some tremors in RLE.      __  Pt took Rytary 145 mg 4 capsule & Ropinirole 2 mg at 9 am.  He waited 60 minutes.  No side effects. No dyskinesias.     __ Went over patient controller with pt & his wife.  They were shown how to turn DBS on & off; adjust voltage; and how to change to MRI and surgery mode.  Pt was able to demonstrate independently.    __  Instructed to stop the short acting Sinemet 25/100 mg and reduce Rytary 145 mg from 4 to 3 capsules.    PD Medications 7:30am  11:30am  3:30pm  7:30pm  11:30pm   Ropinirole 2 mg   1    1    1   Rytary 145 mg  3  3  3   3  3   Sinemet 25/100 mg  1 tab qid prn  Stop        Stop   Amantadine 100 mg    1              __ Instructed to call if there is any side effect from today's programming or worsening symptoms.     __ Will return for DBS initial programming of the Right STN on Aug 16th.  He'll come to clinic OFF medication.      The total amount of time spent with patient in DBS programming was 180 minutes.     DIETER Bacon, CNP  [facility]     7:03 AM  9:56 AM

## 2019-08-06 NOTE — NURSING NOTE
Chief Complaint   Patient presents with     DBS Programming     P DEEP BRAIN STIMULATION PROGRAMMING       Greg Oseguera, EMT

## 2019-08-08 DIAGNOSIS — G20.A1 PARKINSON'S DISEASE (H): Primary | ICD-10-CM

## 2019-08-08 NOTE — TELEPHONE ENCOUNTER
Last Written Prescription Date:  08/17/18  Last Fill Quantity: 1350,  # refills: 0   Last office visit: 08/17/18 previous visit found with prescribing provider:     Future Office Visit: 08/16/19 [place]

## 2019-08-13 ASSESSMENT — MOVEMENT DISORDERS SOCIETY - UNIFIED PARKINSONS DISEASE RATING SCALE (MDS-UPDRS)
TOTAL_SCORE: 12
SPEECH: MILD: MY SPEECH CAUSES PEOPLE TO ASK ME TO OCCASIONALLY REPEAT MYSELF, BUT NOT EVERYDAY.
DRESSING: SLIGHT: I AM SLOW BUT I DO NOT NEED HELP.
HYGIENE: SLIGHT:  I AM SLOW BUT I DO NOT NEED ANY HELP.
CHEWING_AND_SWALLOWING: NORMAL: NO PROBLEMS.
HOBBIES_AND_OTHER_ACTIVITIES: SLIGHT: I AM A BIT SLOW BUT DO THESE ACTIVITIES EASILY.
WALKING_AND_BALANCE: SLIGHT: I AM SLIGHTLY SLOW OR MAY DRAG A LEG.  I NEVER USE A WALKING AID.
TREMOR: MILD: SHAKING OR TREMOR CAUSES PROBLEMS WITH ONLY A FEW ACTIVITES.
TURNING_IN_BED: NORMAL: NOT AT ALL (NO PROBLEMS).
EATING_TASKS: SLIGHT: I AM SLOW, BUT I DO NOT NEED ANY HELP HANDLING MY FOOD AND HAVE NOT HAD FOOD SPILLS WHILE EATING.
SALIVA_AND_DROOLING: SLIGHT: I HAVE TOO MUCH SALIVA, BUT DO NOT DROOL.
HANDWRITING: SLIGHT: MY WRITING IS SLOW, CLUMSY OR UNEVEN, BUT ALL WORDS ARE CLEAR.
GETTING_OUT_OF_BED_CAR_DEEP_CHAIR: SLIGHT: I AM SLOW OR AWKWARD, BUT I USUALLY CAN DO IT ON MY FIRST TRY.
FREEZING: NORMAL: NOT AT ALL (NO PROBLEMS).

## 2019-08-16 ENCOUNTER — ANCILLARY PROCEDURE (OUTPATIENT)
Dept: CT IMAGING | Facility: CLINIC | Age: 59
End: 2019-08-16
Attending: NURSE PRACTITIONER
Payer: COMMERCIAL

## 2019-08-16 ENCOUNTER — OFFICE VISIT (OUTPATIENT)
Dept: NEUROLOGY | Facility: CLINIC | Age: 59
End: 2019-08-16
Payer: COMMERCIAL

## 2019-08-16 VITALS
SYSTOLIC BLOOD PRESSURE: 128 MMHG | WEIGHT: 225.7 LBS | HEART RATE: 98 BPM | BODY MASS INDEX: 34.13 KG/M2 | OXYGEN SATURATION: 94 % | DIASTOLIC BLOOD PRESSURE: 88 MMHG

## 2019-08-16 DIAGNOSIS — G20.A1 PARKINSON'S DISEASE (H): Primary | ICD-10-CM

## 2019-08-16 DIAGNOSIS — G20.A1 PARKINSON DISEASE (H): ICD-10-CM

## 2019-08-16 DIAGNOSIS — Z96.89 S/P DEEP BRAIN STIMULATOR PLACEMENT: ICD-10-CM

## 2019-08-16 ASSESSMENT — UNIFIED PARKINSONS DISEASE RATING SCALE (UPDRS)
AMPLITUDE_LLE: MILD > 1 CM BUT < 3 CM IN MAXIMAL AMPLITUDE.
PARKINSONS_MEDS: OFF
FREEZING_GAIT: NORMAL
HANDMOVEMENTS_LEFT: SLIGHT: ANY OF THE FOLLOWING: A) THE REGULAR RHYTHM IS BROKEN WITH ONE WITH ONE OR TWO INTERRUPTIONS OR HESITATIONS OF THE MOVEMENT B) SLIGHT SLOWING C) THE AMPLITUDE DECREMENTS NEAR THE END OF THE 10 MOVEMENTS.
AMPLITUDE_LIP_JAW: NORMAL: NO TREMOR.
HANDMOVEMENTS_RIGHT: NORMAL
RIGIDITY_LLE: MILD: RIGIDITY DETECTED WITHOUT THE ACTIVATION MANEUVER.  FULL RANGE OF MOTION IS EASILY ACHIEVED.
RIGIDITY_RUE: SLIGHT: RIGIDITY ONLY DETECTED WITH ACTIVATION MANEUVER.
AMPLITUDE_LUE: NORMAL: NO TREMOR.
RIGIDITY_RLE: SLIGHT: RIGIDITY ONLY DETECTED WITH ACTIVATION MANEUVER.
RIGIDITY_LUE: MILD: RIGIDITY DETECTED WITHOUT THE ACTIVATION MANEUVER.  FULL RANGE OF MOTION IS EASILY ACHIEVED.
TOETAPPING_RIGHT: SLIGHT: ANY OF THE FOLLOWING: A) THE REGULAR RHYTHM IS BROKEN WITH ONE WITH ONE OR TWO INTERRUPTIONS OR HESITATIONS OF THE MOVEMENT B) SLIGHT SLOWING C) THE AMPLITUDE DECREMENTS NEAR THE END OF THE 10 MOVEMENTS.
PRONATION_SUPINATION_LEFT: SLIGHT: ANY OF THE FOLLOWING: A) THE REGULAR RHYTHM IS BROKEN WITH ONE WITH ONE OR TWO INTERRUPTIONS OR HESITATIONS OF THE MOVEMENT B) SLIGHT SLOWING C) THE AMPLITUDE DECREMENTS NEAR THE END OF THE 10 MOVEMENTS.
TOTAL_SCORE_LEFT: 12
AMPLITUDE_RUE: SLIGHT: < 1 CM IN MAXIMAL AMPLITUDE.
RIGIDITY_NECK: MILD: RIGIDITY DETECTED WITHOUT THE ACTIVATION MANEUVER.  FULL RANGE OF MOTION IS EASILY ACHIEVED.
POSTURE: 2 MILD.  DEFINITE FLEXION, SCOLIOSIS OR LEANING OT ONE SIDE, BUT CAN CORRECT POSTURE TO NORMAL WHEN ASKED.
TOTAL_SCORE: 31
POSTURAL_STABILITY: NORMAL:  RECOVERS WITH ONE OR TWO STEPS.
LEG_AGILITY_LEFT: SLIGHT: ANY OF THE FOLLOWING: A) THE REGULAR RHYTHM IS BROKEN WITH ONE WITH ONE OR TWO INTERRUPTIONS OR HESITATIONS OF THE MOVEMENT B) SLIGHT SLOWING C) THE AMPLITUDE DECREMENTS NEAR THE END OF THE 10 MOVEMENTS.
CONSTANCY_TREMOR_ATREST: MODERATE: TREMOR AT REST IS PRESENT 51-75% OF THE ENTIRE EXAMINATION PERIOD.
TOETAPPING_LEFT: SLIGHT: ANY OF THE FOLLOWING: A) THE REGULAR RHYTHM IS BROKEN WITH ONE WITH ONE OR TWO INTERRUPTIONS OR HESITATIONS OF THE MOVEMENT B) SLIGHT SLOWING C) THE AMPLITUDE DECREMENTS NEAR THE END OF THE 10 MOVEMENTS.
AXIAL_SCORE: 8
FACIAL_EXPRESSION: MILD: IN ADDITION TO DECREASED EYE-BLINK FREQUENCY, MASKED FACIES PRESENT IN THE LOWER FACE AS WELL, NAMELY FEWER MOVEMENTS AROUND THE MOUTH, SUCH AS LESS SPONTANEOUS SMILING, BUT LIPS NOT PARTED.
FINGER_TAPPING_LEFT: NORMAL
PRONATION_SUPINATION_RIGHT: SLIGHT: ANY OF THE FOLLOWING: A) THE REGULAR RHYTHM IS BROKEN WITH ONE WITH ONE OR TWO INTERRUPTIONS OR HESITATIONS OF THE MOVEMENT B) SLIGHT SLOWING C) THE AMPLITUDE DECREMENTS NEAR THE END OF THE 10 MOVEMENTS.
FINGER_TAPPING_RIGHT: NORMAL
SPEECH: NORMAL
SPONTANEITY_OF_MOVEMENT: 2: MILD: MILD GLOBAL SLOWNESS AND POVERTY OF SPONTANEOUS MOVEMENTS.
LEG_AGILITY_RIGHT: NORMAL
TOTAL_SCORE: 8
GAIT: NORMAL
AMPLITUDE_RLE: MILD > 1 CM BUT < 3 CM IN MAXIMAL AMPLITUDE.
ARISING_CHAIR: NORMAL: ABLE TO ARISE QUICKLY WITHOUT HESITATION.

## 2019-08-16 ASSESSMENT — PAIN SCALES - GENERAL: PAINLEVEL: NO PAIN (0)

## 2019-08-16 NOTE — PATIENT INSTRUCTIONS
August 16, 2019    Dear Cecy Dion Villavicencio,    Thank you for coming today.  During your visit, we have discussed the following:     __ Your DBS electrical system function (impedance) is normal. The battery life is also good.    __ Today, your right brain (Left Body) DBS has been turned ON and programmed.  No DBS programing changes were made to the Left Brain (Right Body.)    __  Continue taking Rytary 145 mg every 4 hours.  For your next dose, take 2 capsules instead of 3 capsules.  If you do well, continue taking 2 capsules every 4 hours.  If you have tremors and you feel off, you can increase bilateral DBS voltage to 2.2 volts, or 2.4 volts and see how you do.  You can increase the voltage to 2.5 volts on both sides if you continue to wear off.      __  Unless dyskinesias are bothersome, don't make too many changes per day.  If you have dyskinesias, you can lower the current until you feel comfortable or turn it off temporarily.     __  After a month, if you are still doing well, you can stop Amantadine 100 mg.     __  Please call if your symptoms worsen or with questions.    __  For your subsequent DBS programming visits, come ON medication.  Return on Sep 25th at 10:10 am.     To contact our clinic, you may call [phone] or use Chloe + Isabel message.     It's good to see you!     DIETER Bacon, CNP  [facility]

## 2019-08-16 NOTE — NURSING NOTE
Chief Complaint   Patient presents with     DBS Programming     [facility] RETURN - 2ND SIDE DEEP BRAIN STIMULATION PROGRAMMING       Yonny Luna, EMT

## 2019-09-23 ASSESSMENT — MOVEMENT DISORDERS SOCIETY - UNIFIED PARKINSONS DISEASE RATING SCALE (MDS-UPDRS)
TURNING_IN_BED: NORMAL: NOT AT ALL (NO PROBLEMS).
HOBBIES_AND_OTHER_ACTIVITIES: SLIGHT: I AM A BIT SLOW BUT DO THESE ACTIVITIES EASILY.
FREEZING: NORMAL: NOT AT ALL (NO PROBLEMS).
TREMOR: SLIGHT: SHAKING OR TREMOR OCCURS BUT DOES NOT CAUSE PROBLEMS WITH ANY ACTIVITIES.
DRESSING: NORMAL: NOT AT ALL (NO PROBLEMS).
SPEECH: SLIGHT: MY SPEECH IS SOFT, SLURRED OR UNEVEN, BUT IT DOES NOT CAUSE OTHERS TO ASK ME TO REPEAT MYSELF.
SALIVA_AND_DROOLING: NORMAL: NOT AT ALL (NO PROBLEMS).
WALKING_AND_BALANCE: SLIGHT: I AM SLIGHTLY SLOW OR MAY DRAG A LEG.  I NEVER USE A WALKING AID.
HANDWRITING: SLIGHT: MY WRITING IS SLOW, CLUMSY OR UNEVEN, BUT ALL WORDS ARE CLEAR.
HYGIENE: NORMAL: NOT AT ALL (NO PROBLEMS).
CHEWING_AND_SWALLOWING: NORMAL: NO PROBLEMS.
EATING_TASKS: NORMAL: NOT AT ALL (NO PROBLEMS).
GETTING_OUT_OF_BED_CAR_DEEP_CHAIR: NORMAL: NOT AT ALL (NO PROBLEMS).
TOTAL_SCORE: 5

## 2019-09-25 ENCOUNTER — OFFICE VISIT (OUTPATIENT)
Dept: NEUROLOGY | Facility: CLINIC | Age: 59
End: 2019-09-25
Payer: COMMERCIAL

## 2019-09-25 VITALS
RESPIRATION RATE: 16 BRPM | DIASTOLIC BLOOD PRESSURE: 111 MMHG | OXYGEN SATURATION: 94 % | HEART RATE: 91 BPM | SYSTOLIC BLOOD PRESSURE: 157 MMHG

## 2019-09-25 DIAGNOSIS — M25.551 HIP PAIN, RIGHT: ICD-10-CM

## 2019-09-25 DIAGNOSIS — Z96.89 S/P DEEP BRAIN STIMULATOR PLACEMENT: ICD-10-CM

## 2019-09-25 DIAGNOSIS — G20.A1 PARKINSON'S DISEASE (H): Primary | ICD-10-CM

## 2019-09-25 ASSESSMENT — UNIFIED PARKINSONS DISEASE RATING SCALE (UPDRS)
SPONTANEITY_OF_MOVEMENT: 0: NORMAL.  NO PROBLEMS.
GAIT: NORMAL
FINGER_TAPPING_LEFT: SLIGHT: ANY OF THE FOLLOWING: A) THE REGULAR RHYTHM IS BROKEN WITH ONE WITH ONE OR TWO INTERRUPTIONS OR HESITATIONS OF THE MOVEMENT B) SLIGHT SLOWING C) THE AMPLITUDE DECREMENTS NEAR THE END OF THE 10 MOVEMENTS.
RIGIDITY_LLE: NORMAL
RIGIDITY_LUE: SLIGHT: RIGIDITY ONLY DETECTED WITH ACTIVATION MANEUVER.
RIGIDITY_NECK: MILD: RIGIDITY DETECTED WITHOUT THE ACTIVATION MANEUVER.  FULL RANGE OF MOTION IS EASILY ACHIEVED.
LEG_AGILITY_LEFT: NORMAL
AMPLITUDE_LUE: NORMAL: NO TREMOR.
PRONATION_SUPINATION_LEFT: SLIGHT: ANY OF THE FOLLOWING: A) THE REGULAR RHYTHM IS BROKEN WITH ONE WITH ONE OR TWO INTERRUPTIONS OR HESITATIONS OF THE MOVEMENT B) SLIGHT SLOWING C) THE AMPLITUDE DECREMENTS NEAR THE END OF THE 10 MOVEMENTS.
LEG_AGILITY_RIGHT: NORMAL
TOETAPPING_LEFT: SLIGHT: ANY OF THE FOLLOWING: A) THE REGULAR RHYTHM IS BROKEN WITH ONE WITH ONE OR TWO INTERRUPTIONS OR HESITATIONS OF THE MOVEMENT B) SLIGHT SLOWING C) THE AMPLITUDE DECREMENTS NEAR THE END OF THE 10 MOVEMENTS.
FACIAL_EXPRESSION: SLIGHT: MINIMAL MASKED FACIES MANIFESTED ONLY BY DECREASED FREQUENCY OF BLINKING.
HANDMOVEMENTS_LEFT: SLIGHT: ANY OF THE FOLLOWING: A) THE REGULAR RHYTHM IS BROKEN WITH ONE WITH ONE OR TWO INTERRUPTIONS OR HESITATIONS OF THE MOVEMENT B) SLIGHT SLOWING C) THE AMPLITUDE DECREMENTS NEAR THE END OF THE 10 MOVEMENTS.
POSTURAL_STABILITY: NORMAL:  RECOVERS WITH ONE OR TWO STEPS.
TOTAL_SCORE: 11
AMPLITUDE_LIP_JAW: NORMAL: NO TREMOR.
AMPLITUDE_LLE: NORMAL: NO TREMOR.
POSTURE: 1 SLIGHT.  NOT QUITE ERECT BUT COULD BE NORMAL FOR OLDER PERSONS.
AXIAL_SCORE: 4
AMPLITUDE_RLE: NORMAL: NO TREMOR.
TOTAL_SCORE_LEFT: 6
RIGIDITY_RLE: NORMAL
FREEZING_GAIT: NORMAL
ARISING_CHAIR: NORMAL: ABLE TO ARISE QUICKLY WITHOUT HESITATION.
PRONATION_SUPINATION_RIGHT: NORMAL
CONSTANCY_TREMOR_ATREST: NORMAL: NO TREMOR.
PARKINSONS_MEDS: ON
AMPLITUDE_RUE: NORMAL: NO TREMOR.
RIGIDITY_RUE: NORMAL
FINGER_TAPPING_RIGHT: NORMAL
HANDMOVEMENTS_RIGHT: NORMAL
SPEECH: NORMAL
TOTAL_SCORE: 1
TOETAPPING_RIGHT: NORMAL

## 2019-09-25 ASSESSMENT — PAIN SCALES - GENERAL: PAINLEVEL: MODERATE PAIN (5)

## 2019-09-25 NOTE — PROGRESS NOTES
"MOVEMENT DISORDERS CLINIC    PATIENT: Dion Villavicencio    : 1960    MARY: 2019    REASON FOR VISIT: Parkinson's disease (PD) follow up and deep brain stimulation (DBS) interrogation and analysis.    HPI: Mr. Dion Villavicencio is a 58 year old right - handed  male who came to the [facility] neurology clinic accompanied by his wife for a follow up visit.  He is s/p Bilateral Subthalamic Nucleus (STN) DBS lead placement on 2019 and left chest infraclavicular Infinity 7 generator placement on 2019 by Dr. Cardozo.  I saw him last in the clinic on 2019 for right STN monopolar review & initial programming.    Since his last visit, he is doing \"great.\"  Motor symptoms are stable consistent.\"  He doesn't have to think about taking extra Sinemet/Rytary before events.  Denies wearing off.  Symptoms are \"leveled\" all day.  In the morning, when he gets up, he is ready to go.  He is very pleased to say that he has achieved his goals in reducing Rytary 145 mg from 16 capsules to 5 capsules.  He feels slightly under-medicate with the option to take more if needed.  At times, he forgets to take his pills for 1 hour or so.  His wife reports that he took a detailed note to find patterns in his symptoms as he was adjusting his DBS and medications, which helped him optimize his DBS settings and reduce his medications.     PD Medications 7:30am  11:30am  3:30pm  7:30pm  11:30pm   Ropinirole 2 mg   1    1    1   Rytary 145 mg  1 1 1 1 1   Amantadine 100 mg    1           He was in ER on  due to right hip pain radiating down his leg.  They gave him oral steroid, which hasn't helped like it did last time he took it.  He has pain when standing and walking.  This has affected his mobility and ability to exercise.     They found a  for their house.  It wasn't on the market yet.  They'll close in January.  They are building a house that they'll be moving into in January.    His wife has noticed that he talks " fast and is animated.  If she has difficulty understanding him, he is able to slow down his speech and annunciate words.     He asked if the extension wire in his left side of neck is pulling.  When he drives for a long time, he feels some pulling and soreness.  He is able to massage it, which relives the discomfort.    He had been doing the elliptical 8 - 10 thousand steps, playing basketball, mowing the grass, and yard work until the hip pain started.  He hasn't been back to exercise like before.  He plans to see his PCP soon.    Patient asked how he could talk about the success of his DBS treatment to his PD support group without feeling guilty.  He said some of them are not good candidate due to cognitive problems or other issues.  One of the members is struggling despite DBS therapy.     MEDICATIONS:   Medication Sig     amantadine (SYMMETREL) 100 MG capsule Take 100 mg by mouth daily (with lunch) 100mg capsule by mouth daily @ 11am-1230pm     carbidopa-levodopa ER (RYTARY) 36. MG CPCR per CR capsule Take 1 capsule by mouth 5 times daily 3 capsules @ 7-830 am, 11am-1230pm, 3-430pm; 7-830 pm and 11pm-1230am     Cholecalciferol (VITAMIN D) 2000 units tablet Take 2,000 Units by mouth every morning 2000 units by mouth daily @ 7-830am     ibuprofen (ADVIL/MOTRIN) 200 MG tablet Take 200 mg by mouth every 4 hours as needed for mild pain     Multiple Vitamins-Minerals (CENTRUM SILVER) per tablet Take 1 tablet by mouth every morning Centrum vitamin by mouth daily @ 7-830am     rOPINIRole (REQUIP) 2 MG tablet Take 2 mg by mouth 3 times daily 2mg tab by mouth @ 7-830am, 3-430pm and 11pm-1230am       ALLERGIES: Penicillins and Pramipexole    DBS Interrogation & Analysis:  Implanted generator:  Left chest Infinity 7   Lead placement:  Bilateral Subthalamic Nucleus (STN)  Lead placement date:  7/9/2019  Generator placement date:  7/11/2019     Battery Service Life:  2.98 volts.     Left  Brain  C +, 3abc -  Amplitude:  1.90 mA  PW:  60 msec  Rate:  130 Hz     Therapy impedance:  1225 Ohms     Patient :  Upper Limit: 2.50 mA  Lower Limit: 0.00 mA    Electrode Impedance Check:   Left Lead: No Problems Found.      Right Brain  C +, 10abc -  Amplitude:  1.75 mA  PW:  60 msec  Rate:  130 Hz    Therapy impedance:  1100 Ohms    Patient :  Upper Limit: 2.50 mA  Lower Limit: 1.50 mA    Electrode Impedance Check:  Right Lead: No Problems Found.      See scanned report for impedance details    PHYSICAL EXAM:    VITAL SIGNS:  Blood pressure (!) 157/111, pulse 91, resp. rate 16, SpO2 94 %.    GENERAL:  Mr. Villavicencio is a pleasant  male who is well-groomed, well-developed, and overweight sitting comfortably in the exam room without any distress.  Affect is appropriate.    MOVEMENT DISORDERS ASSESSMENT: (Last Sinemet was about 4 hrs ago)    UPDRS Values 9/25/2019   Time: 10:56 AM   Medication On   R Brain DBS: On   L Brain DBS: On   Speech 0   Facial Expression 1   Rigidity Neck 2   Rigidity RUE 0   Rigidity LUE 1   Rigidity RLE 0   Rigidity LLE 0   Finger Taps R 0   Finger Taps L 1   Hand Mvt R 0   Hand Mvt L 1   Pron-/Supinate R 0   Pron-/Supinate L 1   Toe Tap R 0   Toe Tap L 1   Leg Agility R 0   Leg Agility L 0   Arise From Chair 0   Gait 0   Gait Freezing 0   Postural Stability 0   Posture 1   Global Spont Mvt 0   Postural Tremor RUE 1   Postural Tremor LUE 0   Kinetic Tremor RUE 0   Kinetic Tremor LUE 1   Rest Tremor RUE 0   Rest Tremor LUE 0   Rest Tremor RLE 0   Rest Tremor LLE 0   Rest Tremor Lip/Jaw 0   Rest Tremor Constancy 0   Total Right 1   Total Left 6   Axial Total 4   Total 11     Dyskinesias:  Slight that comes when talking and walking.     ASSESSMENT:    1)  Parkinson's Disease:  Patient has a 12 - 13 year history of PD.  He is s/p bilateral STN DBS implantation with significant motor improvements.  He has reduced his daily Rytary 145 mg dose from 16 capsules to 5  capsules per day.  He is very pleased with the outcome of his surgery.    2)  S/p Bilateral STN DBS lead implantation:  Normal impedance and battery life.  No programming completed.     3)  Right hip pain.  Still bothersome and needs improvement.     PLAN:    __  Questions about his left side of neck discomfort answered.  The extension wire moves with his neck.  He'll keep an eye on it.    __  He was encouraged to discuss his successful DBS therapy without feeling guilty as he has good intentions to share his life with his PD support group.  Also, he might encourage others who might consider having the surgery.    __  His UPDRS III was reviewed from pre-DBS, post 1st side programming, and today's exam.  He was very pleased to see his improvements.    __  He'll stay on the same antiparkinsonian medication.     PD Medications 7:30am  11:30am  3:30pm  7:30pm  11:30pm   Ropinirole 2 mg   1    1    1   Rytary 145 mg  1 1 1 1 1   Amantadine 100 mg    1           __  He'll return to see Dr. England for a f/u.    __  He'll f/u with his PCP or Ortho to address right hip pain.      __  Will return on 6 months to check his DBS or sooner PRN.     The total amount of time spent with the patient was 40 minutes; 20 min in DBS interrogation & analysis and 20 min in addressing PD and answering questions.  Over 50% of the time was spent in counseling & coordination of care.      DIETER Bacon,  CNP  [facility] Neurology Clinic    10:20 AM  11:08 AM

## 2019-09-25 NOTE — Clinical Note
"2019       RE: Dion Villavicencio  [address]  [place] [address]     Dear Colleague,    Thank you for referring your patient, Dion Villavicencio, to the [facility] NEUROLOGY at [facility]. Please see a copy of my visit note below.    MOVEMENT DISORDERS CLINIC    PATIENT: Dion Villavicencio    : 1960    MARY: 2019    REASON FOR VISIT: Parkinson's disease (PD) follow up.    HPI: Mr. Dion Villavicencio is a 58 year old right - handed  male who came to the [facility] neurology clinic accompanied by his wife for a follow up visit.  I saw him last in the clinic on *** by *** for ***.  During that visit, the following were discussed: -    He is doing \"great.\"    Motor symptoms are stable consistent.     He has Hip strain, right, initial encounter -- oral steroid. Standing, walking is hard, but         There'll be moving into a spect home in [facility].     PD Medications 7:30am  11:30am  3:30pm  7:30pm  11:30pm   Ropinirole 2 mg   1    1    1   Rytary 145 mg  1 1 1 1 1   Amantadine 100 mg    1            He is a little under wit the option of to go hier.    1.95    They wanted    3.45 He forgets to take his meds. As, he used to     He logged everything to find pattern.    He talks fast and animated.  He has to slow down his speech to anninciate    If feels something when leaving pulling in the right neck when doing a lot of driving     He had been doing the elliptical until sciatica pain 8 - 10 thousand steps.  Basketball, mowing, yard work    He was at [employer] doing well.        MEDICATIONS:   Outpatient Medications Marked as Taking for the 19 encounter (Office Visit) with Zainab Bella APRN CNP   Medication Sig     amantadine (SYMMETREL) 100 MG capsule Take 100 mg by mouth daily (with lunch) 100mg capsule by mouth daily @ 11am-1230pm     carbidopa-levodopa ER (RYTARY) 36. MG CPCR per CR capsule Take 3 capsules by mouth 5 times daily 3 capsules @ 7-830 " am, 11am-1230pm, 3-430pm; 7-830 pm and 11pm-1230am     Cholecalciferol (VITAMIN D) 2000 units tablet Take 2,000 Units by mouth every morning 2000 units by mouth daily @ 7-830am     ibuprofen (ADVIL/MOTRIN) 200 MG tablet Take 200 mg by mouth every 4 hours as needed for mild pain     Multiple Vitamins-Minerals (CENTRUM SILVER) per tablet Take 1 tablet by mouth every morning Centrum vitamin by mouth daily @ 7-830am     rOPINIRole (REQUIP) 2 MG tablet Take 2 mg by mouth 3 times daily 2mg tab by mouth @ 7-830am, 3-430pm and 11pm-1230am       ALLERGIES: Penicillins and Pramipexole    DBS Interrogation & Analysis:  Implanted generator:  Left chest Infinity 7   Lead placement:  Bilateral Subthalamic Nucleus (STN)  Lead placement date:  7/9/2019  Generator placement date:  7/11/2019     Battery Service Life:   2.98 volts.     Left Brain  C +, 3abc -  Amplitude:  1.90 mA  PW:  60 msec  Rate:  130 Hz     Therapy impedance:  1225 Ohms     Patient :  Upper Limit: 2.50 mA  Lower Limit: 0.00 mA    Electrode Impedance Check:   Left Lead: No Problems Found.      Right Brain  C +, 10abc -  Amplitude:   1.75 mA  PW:  60 msec  Rate:  130 Hz    Therapy impedance:  1100 Ohms    Patient :  Upper Limit: 2.50 mA  Lower Limit: 1.50 mA    Electrode Impedance Check:  Right Lead: No Problems Found.      See scanned report for impedance details    PHYSICAL EXAM:    VITAL SIGNS:  Blood pressure (!) 157/111, pulse 91, resp. rate 16, SpO2 94 %.    GENERAL:  Mr. Villavicencio is a pleasant *** male who is {General:[id number]} sitting comfortably in the exam room without any distress.  Affect is appropriate.    MOVEMENT DISORDERS ASSESSMENT: (Last Sinemet was about *** hrs ago)  UPDRS Values 8/27/2018 8/27/2018 8/6/2019 8/16/2019   Time: 7:54 AM 9:17 AM 7:25 AM 7:25 AM   Medication Off On Off Off   R Brain DBS: None None Off Off   L Brain DBS: None None Off On   Speech 1 1 1 0   Facial Expression 1 1 3 2   Rigidity Neck 1 0 2 2   Rigidity  RUE 2 1 2 1   Rigidity LUE 2 2 2 2   Rigidity RLE 3 1 2 1   Rigidity LLE 3 1 2 2   Finger Taps R 2 2 1 0   Finger Taps L 2 2 1 0   Hand Mvt R 3 0 1 0   Hand Mvt L 2 0 1 1   Pron-/Supinate R 3 2 1 1   Pron-/Supinate L 2 2 1 1   Toe Tap R 2 2 2 1   Toe Tap L 3 3 3 1   Leg Agility R 2 1 1 0   Leg Agility L 3 2 1 1   Arise From Chair 1 0 0 0   Gait 1 0 0 0   Gait Freezing 0 1 0 0   Postural Stability 0 0 0 0   Posture 2 2 2 2   Global Spont Mvt 1 1 1 2   Postural Tremor RUE 2 0 1 1   Postural Tremor LUE 0 0 0 1   Kinetic Tremor RUE 1 1 1 0   Kinetic Tremor LUE 1 0 1 1   Rest Tremor RUE 1 1 2 1   Rest Tremor LUE 0 0 0 0   Rest Tremor RLE 1 0 2 2   Rest Tremor LLE 1 0 2 2   Rest Tremor Lip/Jaw 0 0 0 0   Rest Tremor Constancy 3 0 4 3   Total Right 22 11 16 8   Total Left 19 12 14 12   Axial Total 8 6 9 8   Total 52 29 43 31     UPDRS Values 9/25/2019   Time: 10:56 AM   Medication On   R Brain DBS: On   L Brain DBS: On   Speech 0   Facial Expression 1   Rigidity Neck 2   Rigidity RUE 0   Rigidity LUE 1   Rigidity RLE 0   Rigidity LLE 0   Finger Taps R 0   Finger Taps L 1   Hand Mvt R 0   Hand Mvt L 1   Pron-/Supinate R 0   Pron-/Supinate L 1   Toe Tap R 0   Toe Tap L 1   Leg Agility R 0   Leg Agility L 0   Arise From Chair 0   Gait 0   Gait Freezing 0   Postural Stability 0   Posture 1   Global Spont Mvt 0   Postural Tremor RUE 1   Postural Tremor LUE 0   Kinetic Tremor RUE 0   Kinetic Tremor LUE 1   Rest Tremor RUE 0   Rest Tremor LUE 0   Rest Tremor RLE 0   Rest Tremor LLE 0   Rest Tremor Lip/Jaw 0   Rest Tremor Constancy 0   Total Right 1   Total Left 6   Axial Total 4   Total 11   Dyskinesias:  ***.       ASSESSMENT:    1)  Parkinson's Disease:  Patient has a *** year history of PD.      2)  ***:      3)  ***:      4)  ***:       PLAN:    __  ***    __  ***    __  ***    __  ***    Will return to our clinic in *** months or sooner as needed.    The total time spent with the patient was *** minutes, and greater than 50%  of this time was spent in counseling and coordination of care.    DIETER Bacon,  CNP  [facility] Neurology Clinic    10:20 AM  11:08 AM      Again, thank you for allowing me to participate in the care of your patient.      Sincerely,    DIETER Escobar CNP

## 2019-09-25 NOTE — PATIENT INSTRUCTIONS
September 25, 2019    Dear  Dion CHAPARRO Saud,    Thank you for coming today.  During your visit, we have discussed the following:     __  Your DBS electrical system function (impedance) is normal. The battery life is also good.    __  Your are doing great.  Your motor symptoms show amazing improvement compared to pre-DBS symptoms.    __  Stay on the same antiparkinsonian medication.     PD Medications 7:30am  11:30am  3:30pm  7:30pm  11:30pm   Ropinirole 2 mg   1    1    1   Rytary 145 mg  1 1 1 1 1   Amantadine 100 mg    1             __  Return on 6 months for a follow up.     To contact our clinic, you may call [phone] or use [employer] message.     It's good to see you!      DIETER Bacon, CNP  [facility] Neurology Clinic

## 2019-09-25 NOTE — NURSING NOTE
Chief Complaint   Patient presents with     RECHECK     [facility] DEEP BRAIN STIMULATION 2ND SIDE DEEP BRAIN STIMULATION PROGRAMMING     Monse Berkowitz CMA

## 2019-11-03 ENCOUNTER — HEALTH MAINTENANCE LETTER (OUTPATIENT)
Age: 59
End: 2019-11-03

## 2019-11-06 ENCOUNTER — MYC MEDICAL ADVICE (OUTPATIENT)
Dept: NEUROLOGY | Facility: CLINIC | Age: 59
End: 2019-11-06

## 2019-11-06 DIAGNOSIS — G20.A1 PARKINSON'S DISEASE (H): Primary | ICD-10-CM

## 2019-11-07 NOTE — TELEPHONE ENCOUNTER
Situation:  Dion Villavicencio is a 59 year old male who receives support for Parkinson's disease. Dion writes today with concerns for leg movements.    Background:    9/25/2019 - Office visit with Jade Bella NP  He was in ER on 9/18 due to right hip pain radiating down his leg.  They gave him oral steroid, which hasn't helped like it did last time he took it.  He has pain when standing and walking.  This has affected his mobility and ability to exercise.   __  He'll f/u with his PCP or Ortho to address right hip pain    7/9/2019 - Deep Brain Stimulation lead placement:  BSTN Abbott Infinity 7    Patient is taking:  carbidopa-levodopa ER (RYTARY) 36. MG CPCR per CR capsule   9/28/2019  No   Sig - Route: Take 1 capsule by mouth 5 times daily 3 capsules @ 7-830 am, 11am-1230pm,    Taking 2 caps 5x daily at 7 AM, 11 AM, 3 PM, 7 PM, 11 PM    amantadine (SYMMETREL) 100 MG capsule 90 capsule 3 8/17/2018  --   Sig - Route: Take 100 mg by mouth daily (with lunch) 100mg capsule by mouth daily @ 11am-   12 PM-at about lunch time    rOPINIRole (REQUIP) 2 MG tablet 270 tablet 3 8/17/2018  No   Sig - Route: Take 2 mg by mouth 3 times daily 2mg tab by mouth @ 7-830am, 3-430pm and 11pm-1230am - Oral    7 AM,3 PM,11 PM    Assessment:  Dion stated he feels cramping and tingling in his feet. It feels like he cant get comfortable and has to take his shoes off so he can move his feet. His toes will be clenched when sitting especially for long periods of time. It occurs more in the late afternoon and at night but can occur in the morning/afternoon. He denies feeling this in his legs or calf's. He stated he first noticed this in early October when his parents asked why his toes were clenched. This occurs more in the left foot than the right foot. Late in the conversation Dion stated he did have his toes curl once in a while before the Deep Brain Stimulation surgery. He thinks it did not occur as much because he was on high doses of  "Rytary. He is unsure if the Rytary helps relieve this or not. He reports he feels the Deep Brain Stimulation is helping with the cramping.  Walking and exercise help relieve the cramping. He reports this does not affect his sleep very much. This was hard to do since he was having sciatic pain in his right leg. This has gotten better over the past 2-3 weeks.    He has noticed an increase in right arm/hand tremors especially when he is excited. He otherwise dos not notice a pattern and whether his Rytary is helping. \"It seems to come and go on its own.\"    He has felt like his lower back and legs are weaker, mostly in his right side. He has some pain/soreness across the lower back. Hard to say when this started, he noticed this once his sciatica started to get better (about 2-3 weeks ago). He is seeing his PCP for management.  Right hip pain has resolved since he was last seen.  Once in a while he feels like he is missing a step when walking.    Dion's questions/concerns:  1. Could the lower back and leg weakness be from the Deep Brain Stimulation surgery?    2. If this is RLS should this be treated with an increase in Parkinson's disease medications?    Education:  1. Restless legs syndrome typically involves movement of the legs when sitting, laying down or especially at night when trying to sleep.  2. Sometimes people with Parkinson's disease can have this uncomfortable/painful toe curling. Typically stretching and exercising the muscles help with this.  3. A side effect of Deep Brain Stimulation can be walking/gait changes. I will defer to Jade on whether she thinks this is Deep Brain Stimulation related or not.    Plan/recommendation:  1. I advised contact your primary care provider if your sciatic pain worsens.  2. The symptoms your describe do not sound consistent with restless leg symptoms, its sounds like a lot of stiffness is occurring.  3. 1 hours after taking your next Rytary dose sit for a while. Send me " a mychart message and let me know if the curling is not as bad or doesn't occur. This is so we can see if Rytary helps.  4. I will discuss your symptoms with Jade and call you back.    22 minute phone call

## 2019-11-07 NOTE — TELEPHONE ENCOUNTER
Please call patient and get details on his leg symptoms.  When do they occur?  Any relationship to Levodopa?  Leg symptoms occur at night?  What makes symptoms better?  Also, what is his current antiparkinsonian medication?  Thank you.

## 2019-11-08 RX ORDER — CARBIDOPA AND LEVODOPA 25; 100 MG/1; MG/1
TABLET ORAL
Qty: 180 TABLET | Refills: 3 | Status: SHIPPED | OUTPATIENT
Start: 2019-11-08

## 2019-11-08 NOTE — TELEPHONE ENCOUNTER
"I informed Dion of Jade's note below.    Dion stated he has noticed eating a lot of protein will cause the curling to get worse.    He woke up last night from his toes/feet cramping.  He did have left over Sinemet  and tried taking this to see if helped. He reported it did help a little bit. He would like Jade to prescribe PRN sinemet.    Plan:  1. Dion will track his symptoms in relation to his dose times. \"Jade knows I can be a pretty adamit tracker.\"    2. I will send a MyChart with the instructions for how to take the PRN sinemet.  "

## 2019-11-08 NOTE — TELEPHONE ENCOUNTER
DBS doesn't cause weakness in low back or leg.  He should continue to f/u with PCP and spine specialist.     It's hard to sort out if his symptoms are related to back issues, PD, or RLS.  Curling of toes could be from PD.  But we need to know when it occurs as sometimes it can occur during OFF period or ON.    He can try using the PRN Sinemet 25/100 mg to see if it relieves his symptoms.

## 2019-12-23 DIAGNOSIS — G20.A1 PARKINSON'S DISEASE (H): Primary | ICD-10-CM

## 2019-12-29 ENCOUNTER — MYC REFILL (OUTPATIENT)
Dept: OTHER | Age: 59
End: 2019-12-29

## 2019-12-29 DIAGNOSIS — G20.C PARKINSONISM, UNSPECIFIED PARKINSONISM TYPE (H): ICD-10-CM

## 2019-12-30 RX ORDER — AMANTADINE HYDROCHLORIDE 100 MG/1
100 CAPSULE, GELATIN COATED ORAL
Qty: 90 CAPSULE | Refills: 3 | Status: SHIPPED | OUTPATIENT
Start: 2019-12-30 | End: 2020-04-17

## 2019-12-30 RX ORDER — ROPINIROLE 2 MG/1
2 TABLET, FILM COATED ORAL 3 TIMES DAILY
Qty: 270 TABLET | Refills: 3 | Status: SHIPPED | OUTPATIENT
Start: 2019-12-30

## 2020-01-02 NOTE — TELEPHONE ENCOUNTER
Ropinirole and Amantadine prescriptions faxed to [facility].

## 2020-01-03 ENCOUNTER — MYC MEDICAL ADVICE (OUTPATIENT)
Dept: NEUROLOGY | Facility: CLINIC | Age: 60
End: 2020-01-03

## 2020-02-02 ENCOUNTER — MYC MEDICAL ADVICE (OUTPATIENT)
Dept: NEUROLOGY | Facility: CLINIC | Age: 60
End: 2020-02-02

## 2020-02-02 DIAGNOSIS — G20.A1 PARKINSON'S DISEASE (H): ICD-10-CM

## 2020-02-03 NOTE — TELEPHONE ENCOUNTER
9/25/2019 - Last office visit with Jade Bella NP:  __  He'll stay on the same antiparkinsonian medication.      PD Medications 7:30am  11:30am  3:30pm  7:30pm  11:30pm   Ropinirole 2 mg   1    1    1   Rytary 145 mg  1 1 1 1 1   Amantadine 100 mg    1            Last updated Rytary prescription in Dion's medication list as of 9/28/2019:  carbidopa-levodopa ER (RYTARY) 36. MG CPCR per CR capsule   9/28/2019  No   Sig - Route: Take 1 capsule by mouth 5 times daily 3 capsules @ 7-830 am, 11am-1230pm, 3-430pm; 7-830 pm and 11pm-1230am - Oral

## 2020-02-03 NOTE — TELEPHONE ENCOUNTER
When I saw patient last time, he was taking Rytary 145 mg 1 cap 5 x per day.  He used to take 16 capsules per day before his bilateral DBS.  So, he might have increased the dose to improve his symptoms.  We need to get clarification from him.  I'm fine refilling his Rx with 1 or 2 capsules.  I just need to get clarification that the pt has actually increased it.  Thank you.

## 2020-04-12 ENCOUNTER — MYC REFILL (OUTPATIENT)
Dept: NEUROLOGY | Facility: CLINIC | Age: 60
End: 2020-04-12

## 2020-04-12 DIAGNOSIS — G20.A1 PARKINSON'S DISEASE (H): ICD-10-CM

## 2020-04-12 DIAGNOSIS — G20.C PARKINSONISM, UNSPECIFIED PARKINSONISM TYPE (H): ICD-10-CM

## 2020-04-13 RX ORDER — AMANTADINE HYDROCHLORIDE 100 MG/1
100 CAPSULE, GELATIN COATED ORAL
Qty: 90 CAPSULE | Refills: 3 | OUTPATIENT
Start: 2020-04-13

## 2020-04-13 RX ORDER — ROPINIROLE 2 MG/1
2 TABLET, FILM COATED ORAL 3 TIMES DAILY
Qty: 270 TABLET | Refills: 3 | OUTPATIENT
Start: 2020-04-13

## 2020-04-13 RX ORDER — LEVODOPA AND CARBIDOPA 145; 36.25 MG/1; MG/1
2 CAPSULE, EXTENDED RELEASE ORAL
Qty: 300 CAPSULE | Refills: 5 | OUTPATIENT
Start: 2020-04-13

## 2020-04-13 NOTE — TELEPHONE ENCOUNTER
Amantadine and Ropinirole faxed to [facility] mail order pharmacy on 1/3/2020 each for a 90 day supply with 3 refills. No new prescription needed.

## 2020-04-13 NOTE — TELEPHONE ENCOUNTER
Rx Authorization:    Requested Medication/ Dose: Ropinrole 2MG Tabs    Date last refill ordered: 12/30/19    Quantity ordered: 270 tabs    # refills: 3    Date of last clinic visit with ordering provider: 9/25/19    Date of next clinic visit with ordering provider: f/u 6 months    All pertinent protocol data (lab date/result):     Include pertinent information from patients message:

## 2020-04-13 NOTE — TELEPHONE ENCOUNTER
Rx Authorization:    Requested Medication/ Dose:Carbidopa-Levodop ER (Rytary) 36. MG CPCR per CR Caps    Date last refill ordered: 2/3/20    Quantity ordered: 300     # refills: 5    Date of last clinic visit with ordering provider: 9/25/19    Date of next clinic visit with ordering provider: f/u 6 months    All pertinent protocol data (lab date/result):     Include pertinent information from patients message:

## 2020-04-17 ENCOUNTER — MYC REFILL (OUTPATIENT)
Dept: NEUROLOGY | Facility: CLINIC | Age: 60
End: 2020-04-17

## 2020-04-17 DIAGNOSIS — G20.C PARKINSONISM, UNSPECIFIED PARKINSONISM TYPE (H): ICD-10-CM

## 2020-04-17 NOTE — TELEPHONE ENCOUNTER
[employer] message sent to Dion asking him if he is switching pharmacies as amantadine was faxed to [facility] mail order pharmacy on 2/1/2020.

## 2020-04-20 RX ORDER — AMANTADINE HYDROCHLORIDE 100 MG/1
100 CAPSULE, GELATIN COATED ORAL
Qty: 90 CAPSULE | Refills: 3 | Status: SHIPPED | OUTPATIENT
Start: 2020-04-20 | End: 2022-02-22

## 2020-04-20 NOTE — TELEPHONE ENCOUNTER
Dion reported his pharmacy ([facility] mail order pharmacy) has closed. He is not using [facility]. All of his prescriptions transferred over except his amantadine. He will need a new prescription for the amantadine.

## 2020-07-06 DIAGNOSIS — G20.A1 PARKINSON'S DISEASE (H): ICD-10-CM

## 2020-07-06 RX ORDER — LEVODOPA AND CARBIDOPA 145; 36.25 MG/1; MG/1
2 CAPSULE, EXTENDED RELEASE ORAL
Qty: 900 CAPSULE | Refills: 1 | Status: SHIPPED | OUTPATIENT
Start: 2020-07-06

## 2020-07-06 NOTE — TELEPHONE ENCOUNTER
Nurse received refill request for: Rytary 36. mg capsules    Pharmacy: [facility]    Last re-ordered: 2/3/2020 to [facility] pharmacy (no longer uses this pharmacy because it has closed)    Last appointment: 9/25/2019    Next appointment: None scheduled    Action taken: Sent to be signed by the provider.

## 2020-09-03 DIAGNOSIS — D68.00 VON WILLEBRAND DISEASE (H): Primary | ICD-10-CM

## 2020-11-03 ENCOUNTER — TELEPHONE (OUTPATIENT)
Dept: HEMATOLOGY | Facility: CLINIC | Age: 60
End: 2020-11-03

## 2020-11-03 NOTE — TELEPHONE ENCOUNTER
Dion Villavicencio is a 60 year old male previously seen by Dr. Ma (7/2019) for surgery consult regarding his history of Type I vWD     Dion called today as he is needing a colonoscopy. His gastroenterologist in the [employer] system would like him to see hematology prior the procedure for recommendations.     This procedure is not yet scheduled, Dion would like to be seen by our team as he was seen here previously but does have a separate hematology visit scheduled on 11/13/20 if unable to be seen by us prior to this.     RN did mention that Dr. Ma is booking out quite a bit. They discussed the possibility of a PA-C visit. Dion was absolutely fine with this idea and does have video capability.     [facility] discuss possibility of a visit. We will then call Dion back with an update.     He has our call back number for any further questions, comments or concerns.

## 2020-11-04 ENCOUNTER — VIRTUAL VISIT (OUTPATIENT)
Dept: HEMATOLOGY | Facility: CLINIC | Age: 60
End: 2020-11-04
Attending: PHYSICIAN ASSISTANT
Payer: COMMERCIAL

## 2020-11-04 DIAGNOSIS — D68.01 VON WILLEBRAND DISEASE, TYPE I (H): Primary | ICD-10-CM

## 2020-11-04 DIAGNOSIS — G20.A1 PARKINSON'S DISEASE (H): ICD-10-CM

## 2020-11-04 PROCEDURE — 99207 PR CDG-EXAM COMPONENT: MEETS COMPREHENSIVE - UP CODED: CPT | Performed by: PHYSICIAN ASSISTANT

## 2020-11-04 PROCEDURE — 99214 OFFICE O/P EST MOD 30 MIN: CPT | Mod: GT | Performed by: PHYSICIAN ASSISTANT

## 2020-11-04 NOTE — PROGRESS NOTES
[facility]  [address] [place] [address] [address], [place] [address]  Main: [phone], Fax: [phone]    Patient seen at: [facility] at [address] [place] [address]    Video Virtual Visit Note:    Patient: Dion Villavicencio  MRN: [id number]  : 1960  MARY: 2020    Due to the ongoing COVID-19 outbreak, this visit was conducted by video, with the patient's approval.    Physician has received verbal consent for a Video Visit from the patient? Yes  Patient logged in via [employer]   Video Start Time: 13:30    Reason of today's visit:  Type I Von Willebrand Disease. Management for his upcoming colonoscopy.     Clinical History Summary:  Dion Villavicencio is a 60 year old male with Parkinson's disease who also has Type I Von Willebrand Disease with his baseline factor VIII level of 30%; VWF:Ag of 15% and VWF:Acitivty of <20%, participates in today's scheduled video visit to discuss hematological management for his upcoming colonoscopy on 2020 at 10:30am. Both Dion's father and his son also has Type I Von Willebrand Disease.     Dion was last seen by Dr. Anish Ma back in 7/15/2019.    Dion underwent deep brain stimulator placement back in 2019 for his Parkinson's disease. For that particular surgery, Dion received a dose of VONVENDI (rVWF) at 60 Units/kg the day prior to his surgery. He had as expected recovery on the day of his surgery. He also received a repeat dose of VONVENDI at 40 Units/kg a few hours after his surgery and also a repeat dose on 2019 just prior to his discharge. Dion did well without any bleeding issues. Dion also had received Humate-P in the past for teeth extraction procedures.     Since his last visit with Dr. Ma back in 2019, he has had no issues with bleeding diathesis.     Interim History:  Dion reports that he has never undergone a screening colonoscopy in the past and most recently he underwent a  fecal immunochemical test (FIT) and came back positive. Now he is in need for a colonoscopy which has not been scheduled but it is anticipated to be done at [facility] facility.     Dion has seen Dr. Yannick Fierro, hematologist, at [facility] in the past as well.     ROS:  He denies any frequent epistaxis. No issues with oral mucosal bleeding. Denies any hematuria or obvious blood in stools. Denies any fever. No chest pain. Denies any shortness of breath.     Medication, Allergies and PmHx:  All have been reviewed by this writer in the electronic medical records.    Social History and Family History:  Deferred.    Objective:  Pleasant in no acute distress.  Visual Examination via Video:  Complete exam is not performed today.     Labs:    Results for DION AWAD (MRN [id number]) as of 7/16/2019 08:14    Ref. Range 7/9/2019 06:10 7/9/2019 17:01 7/10/2019 06:21 7/11/2019 03:38 7/12/2019 06:52 7/15/2019 07:20   Factor 8 Assay Latest Ref Range: 55 - 200 % 87 126 138 201 (H) 175 105   von Willebrand Antigen Latest Ref Range: 50 - 200 % 109 98 167 155 132 88   von Willebrand Factor Activity Latest Ref Range: 50 - 180 % 80 57 128 106 73 35 (L)           Assessment:  Dion is a 60 year old male with severe Type I Von Willebrand Disease (VWD) who also has a history of refractory Parkinson's Disease S/P deep brain stimulator, now with a positive fecal immunochemical test in need to schedule for a colonoscopy, participates in today's video visit to discuss hematological treatment plan for this colonoscopy procedure on 12/17/2020 at 10:30am at [facility].     In the past year, Dion has done well without any bleeding diathesis with his daily activities.     Diagnosis:  1. Severe Type I Von Willebrand Disease.   2. Positive FIT test in need for colonoscopy.  3. Parkinson's disease S/P deep brain stimulator placement in 2019.    Plan:  He is cleared from our standpoint to proceed with screening colonoscopy. He will need  Von Willebrand Factor concentrate infusion prior to his colonoscopy. If biopsy or polypectomy was done, then he will need Amicar or Lysteda for 10 days as well as repeat dosing of Humate-P daily for 1 additional day.    I explain to Dion that there are a couple options to have him get a dose of Von Willebrand Factor concentrate prior to the procedure: 1) come to our clinic the morning of his procedure to receive his dose prior to his colonoscopy, 2) arrange and coordinate to get his dose prior to his procedure at [facility] on the day of his procedure. Dion would rather do the later option as he is concern about the bowel prep the day of his procedure and the need to drive around town, which is very reasonable.     Thus the hematological treatment plan for his upcoming colonoscopy procedure is as follow:  1. Infuse Humate-P at 60 RCo Units/kg IV bolus at 4 mL/min just 30-60 minutes prior to his scheduled colonoscopy procedure, Dr. Fierro to arrange and place order.   2. If biopsy or polypectomy was done: He will need repeat dose of Humate-P at 60 Units/kg on 12/18/2020 AND Amicar at 3 grams PO TID x 10 days.   3. If no biopsy or polypectomy was done: He will not need repeat dosing of Humate-P or Amicar or Lysteda.   4. Dion is instructed to call our clinic after his colonoscopy to let us know if biopsy or polypectomy was done. If these were done, he will have to come to our pharmacy to  his Amicar as it will only cost him $5 out of pocket at most.     I have contacted Dr. Yannick Fierro's office and spoke to his nursing coordinator, Nena. I will fax the above recommendation to Dr. Fierro and Nena at [phone]. Dr. Fierro will have to arrange for the Humate-P infusion prior to the procedure as this writer do not have credentials at [facility]. If the patient needs to have biopsy or polypectomy, then he can either come into our clinic on 12/18/2020 to get his follow up dose of Humate-P OR   Vadim can arrange for him to get that done at [facility]. Dion is instructed to call us right after his colonoscopy procedure. If he has a biopsy or polypectomy done, he will have to come to our clinic that day to  his Amicar prescription through our pharmacy as it will only cost him $5 out of pocket at most.     Case discussed in details with Dr. Anish Ma, staff hematologist. He agrees with the above plan and recommendation.     Video-Visit Details:    Type of service:  Video Visit  Video End Time (time video stopped): 13:40  Originating Location (pt. Location): Home  Distant Location (provider location):  [facility] HEMOPHILIA OTHER SERVICES   Mode of Communication:  Video Conference via [employer]      Vern Ding PA-C, MPAS  Physician Assistant  [facility].

## 2020-11-04 NOTE — PATIENT INSTRUCTIONS
Jeff Ashley,    It was nice to finally meet you via video visit today.    Below is what we have discussed today:  1. Will have Dr. Fierro, hematologist at [facility] arrange to have you get a dose of Humate-P infusion on the day of your colonoscopy procedure.   2. Please call our clinic at [phone] and ask to speak to Becky Sanchez or one of the nursing staffs right after your colonoscopy to let us know if they did or did not perform any biopsy or polypectomy.   3. If they did biopsy or polypectomy, you might have to come into our clinic to  the prescription of Amicar, which will be $1 per pill bottle or $5 per liquid syrup bottle.  4. One of our administrative assistants will contact you to schedule a follow up appointment with us in one year.     Thank you once again in choosing our clinic as part of your healthcare team.      Vern Ding PA-C, MPAS  Physician Assistant  [facility].

## 2020-11-16 ENCOUNTER — HEALTH MAINTENANCE LETTER (OUTPATIENT)
Age: 60
End: 2020-11-16

## 2021-01-25 ENCOUNTER — DOCUMENTATION ONLY (OUTPATIENT)
Dept: HEMATOLOGY | Facility: CLINIC | Age: 61
End: 2021-01-25

## 2021-02-17 ENCOUNTER — TELEPHONE (OUTPATIENT)
Dept: NEUROPSYCHOLOGY | Facility: CLINIC | Age: 61
End: 2021-02-17

## 2021-02-17 NOTE — TELEPHONE ENCOUNTER
Patient was informed to schedule his 1 years Deep Brain Stimulation neuropsych. Patient stated he is seeing a new provider, Dr. Hellen England and wants to know if he still needs to complete the neuropsych.     A staff message was sent to Dr. Marsh to advise on this.    Patient was informed the writer will notify him of what Dr. Marsh's response is.

## 2021-03-09 DIAGNOSIS — Z96.89 S/P DEEP BRAIN STIMULATOR PLACEMENT: ICD-10-CM

## 2021-03-09 DIAGNOSIS — G20.A1 PARKINSON'S DISEASE (H): Primary | ICD-10-CM

## 2021-03-09 NOTE — TELEPHONE ENCOUNTER
Patient was informed ELVIA Hansen would like the patient to be evaluated for a neuropsych evaluation follow up and it is up to the patient with whom he wants to follow up with.    Patient stated he would like to keep it consistent and want to schedule with [facility]. Patient stated he did not have his calendar and asked that a message be sent to him with a couple of dates.    A [employer] message was sent to the patient with a couple of dates the patient can schedule on.

## 2021-03-30 ENCOUNTER — VIRTUAL VISIT (OUTPATIENT)
Dept: NEUROPSYCHOLOGY | Facility: CLINIC | Age: 61
End: 2021-03-30
Payer: COMMERCIAL

## 2021-03-30 DIAGNOSIS — G20.A1 PARKINSON'S DISEASE (H): Primary | ICD-10-CM

## 2021-03-30 DIAGNOSIS — F09 MENTAL DISORDER DUE TO GENERAL MEDICAL CONDITION: ICD-10-CM

## 2021-03-30 PROCEDURE — 99207 PR NO CHARGE LOS: CPT | Mod: GT

## 2021-03-30 NOTE — PROGRESS NOTES
Dion Villavicencio is a 60 year old who is being evaluated via a billable video visit.      How would you like to obtain your AVS? MyChart  If the video visit is dropped, the invitation should be resent by: Send to e-mail at: [email or web address]  Will anyone else be joining your video visit? Wife    Video Start Time: 10:11 AM    The patient and his wife were interviewed as part of the neuropsychological evaluation. COVID-19 related precautions are in place. In an effort to reduce the amount of time spent in the clinic, the interview was conducted remotely today, and he is scheduled to be seen in the clinic next week to complete the testing portion of the evaluation. Full report to follow, pending completion of the evaluation.    Video-Visit Details    Type of service:  Video Visit    Video End Time:10:50 AM    Originating Location (pt. Location): Home    Distant Location (provider location):  [facility] NEUROPSYCHOLOGY [place]     Platform used for Video Visit: GRAYL

## 2021-04-08 ENCOUNTER — OFFICE VISIT (OUTPATIENT)
Dept: NEUROPSYCHOLOGY | Facility: CLINIC | Age: 61
End: 2021-04-08
Payer: COMMERCIAL

## 2021-04-08 DIAGNOSIS — G20.A1 PARKINSON'S DISEASE (H): Primary | ICD-10-CM

## 2021-04-08 DIAGNOSIS — F09 MENTAL DISORDER DUE TO GENERAL MEDICAL CONDITION: ICD-10-CM

## 2021-04-08 PROCEDURE — 96132 NRPSYC TST EVAL PHYS/QHP 1ST: CPT

## 2021-04-08 PROCEDURE — 96138 PSYCL/NRPSYC TECH 1ST: CPT

## 2021-04-08 PROCEDURE — 96133 NRPSYC TST EVAL PHYS/QHP EA: CPT

## 2021-04-08 PROCEDURE — 90791 PSYCH DIAGNOSTIC EVALUATION: CPT

## 2021-04-08 PROCEDURE — 96139 PSYCL/NRPSYC TST TECH EA: CPT

## 2021-04-08 NOTE — LETTER
4/8/2021      RE: Dion Villavicencio  [address]  [facility] [address]       NEUROPSYCHOLOGICAL EVALUATION    RELEVANT HISTORY AND REASON FOR REFERRAL    Dion Villavicencio is a 60 year old, right handed,  and  with 16 years of formal education. Information was obtained via video interview with the patient and his wife, and review of his medical records, including a prior neuropsychological evaluation. Record indicate a history of Parkinson's disease, diagnosed in 2006. Symptom onset was in 2006 with initial presentation of difficulty letting go of a baseball while throwing it. Over time, his symptoms worsened. He underwent bilateral subthalamic nucleus (STN) deep brain stimulation (DBS) lead placement on 7/9/2019. This neuropsychological evaluation was requested by DIETER Ríos, ELVIA, as part of the standard-of-care one year follow up to surgery, delayed due to the pandemic. Specifically, the evaluation was requested for further characterization of any cognitive difficulties, to assist in determining whether there had been changes since baseline, and to assist with adjusting treatment plans.     Records indicate that Mr. Villavicencio first underwent a neuropsychological evaluation under my direction on 8/30/2018. Please refer to the report from that date for full details regarding his history and the findings of the evaluation. Briefly, results indicated impairments in learning, which were somewhat variable, and memory that fell within normal limits. Performance otherwise fell within normal limits across cognitive domains, generally in the above average range. He did not report experiencing significant depressive symptomatology, anxiety, or apathy.      Precautions are in place related to COVID-19. In an effort to reduce the amount of time spent in the clinic, Mr. Villavicencio and his wife completed the interview portion of the evaluation over video on 3/30/2021, and he was seen in the clinic today to complete  the testing portion of the evaluation.    CLINICAL INTERVIEW FINDINGS    Upon interview, Mr. Villavicencio indicated that surgery went well.  He had both sides done at once and did not have any complications.  He stated that the only challenge was getting it set up and finding the correct spot, and figuring out whether he should be changing the settings.  He has had no adjustments since 2019, until a week ago when he increased the stimulation a little.  He has had good benefit in that his symptoms are much more consistent and predictable, and he is not spiking up and down.    Mr. Villavicencio and his wife have noticed at most subtle changes in cognition.  He is perhaps taking longer to think of words.  He will tend to begin a conversation with someone such as his wife, assuming that she knows what he is talking about, when she does not.  He tends to start projects such as the taxes, but does not always finish them as he is more easily distracted.  He tends to leave things out associated with the projects that he has been working on, such as his tools.  This is a change for him.  He also seems to be more consumed with daily news, and gets worked up by things that he may not have been worked up by before.  He and his wife agree that this may be because he is more isolated due to the pandemic and he is watching television more.  He notices some difficulty remembering events that have happened recently.  He denied difficulty remembering conversations, remembering names of familiar people, or becoming lost in familiar places.  He is not noticing changes in decision-making.    Mr. Villavicencio lives with his wife, and she has always managed their bills.  He manages some aspects of the finances such as their taxes, and he denied difficulty carrying out this responsibility.  He manages his own medications, apparently without difficulty.  He can tell by his symptoms if he is very late with the Parkinson's medications, or if he has eaten protein  around the time of the dose.  He takes an extra dose occasionally during the midday, which his neurologist provided for him.  He uses this if he is shoveling or doing something strenuous.  Sometimes he skips the last dose of the day if he is going to bed earlier.  He drives, generally without difficulty, although he does not trust himself as much in traffic.  He handles his personal cares independently.    Mr. Villavicencio is not working with a psychologist.  He described his mood as all right, but acknowledged that he is more snappy and irritated in the last year.  He has some feelings of sadness and hopelessness which he attributes to the pandemic.  He has not been feeling guilty.  He has had anxiety, and tends to worry more.  He sleeps well using a CPAP, 7 hours a night.  He has some cramping in his left leg, which can affect his sleep.  He may doze off once or twice a week, usually when he is working and his back is sore, so he lies down on the bed and then falls asleep.  His appetite has been good.  He has intentionally lost 25 pounds by walking, stating that he knows that people gain weight with DBS so he has been careful.  He is walking 10,000 steps a day and doing balance and strength exercises.  His interest level is good but he is limited in what he is able to do because of the pandemic.  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  He and his wife have had their first dose of the vaccine.  He denied auditory hallucinations.  He has not had visual hallucinations, although he sometimes sees flashes of light in the corner of his eyes.  He denied suicidal ideation or any history of attempts to commit suicide.  He denied gambling.  He endorsed a history of impulsivity with the medication that he had taken, but this has resolved since he has been off of that medication.  His wife stated that sometimes he can be compulsive in his work, but he is not working now and while  he can be compulsive in finishing a project related to his volunteer work, these are always time-limited so it has not been a problem.  He denied alcohol, tobacco, or illicit drug use.    Since his last evaluation, Mr. Villavicencio has not had any major illnesses or injuries.  His balance has been a little off but he has not been falling.  If he leans forward and tries to grab something he may start to fall and has to catch himself.  As noted, he has had cramping in his left calf.  He experiences headaches if he stares at a screen all day long.    PAST MEDICAL HISTORY: Medical records indicate a history of Parkinson's disease, von Willebrand's disease, esophageal reflux, obstructive sleep apnea.    CURRENT MEDICATIONS:  Include amantadine, carbidopa-levodopa (Sinemet, Rytary), cholecalciferol, ibuprofen, multiple-vitamins-minerals, oxycodone, ropinirole.    FAMILY MEDICAL HISTORY:  Significant for a maternal grandmother who  at 94 with Alzheimer s disease, a mother who may have memory problems at the age of 77, and a maternal uncle with cerebrovascular disease.    BEHAVIORAL OBSERVATIONS    During the evaluation, Mr. Villavicencio was pleasant, cooperative, and seemed to understand the instructions. He was at times impulsive in his responses. He was alert and oriented to person, place, and time. Abnormal movements were observed in his legs. Mood was neutral. Speech was fluent, with normal articulation, volume, and rate. Spontaneous conversation was present and appropriate. Internal performance validity measures fell within normal limits. The results are believed to accurately reflect his current level of functioning.      MEASURES ADMINISTERED    The following measures were administered by a trained psychometrist, under the direct supervision of a licensed psychologist.    Subtests of the Wechsler Adult Intelligence Scale-4; subtests of the Wechsler Memory Scale-Revised; Callahan Verbal Learning Test-Revised, Form ; Brief  Visual Memory Test - Revised, Form 2; [place] Naming Test; D-KEFS Verbal Fluency, Standard Form; Trail Making Test; Stroop; Wisconsin Card Sorting Test - 64; [place] Judgement of Line Orientation Form H; Clock Drawing; Dementia Rating Scale - 2 (DRS-2) Standard Form;  MoCA v. 7.2; Schrader Depression Inventory-2 (BDI-2), HAM-D, HAM-A, Apathy Scale, RBDSQ; QUIP, PDQ-39, ESS.     RESULTS AND INTERPRETATION    Overall intellectual functioning was estimated to fall in the above average range, consistent with premorbid estimates based on single word reading abilities administered at his prior evaluation.  Performance on a screening measure of dementia was high average (DRS-2 Total Score = 141/144).  Performance on the MoCA included no errors (30/30).    Confrontation naming was above average for his age and level of education.  Verbal abstract reasoning was above average.  Ability to comprehend and articulate responses to complex social situations was above average.  Letter fluency, generative naming category, and switching fluency were all above average.    Attention span was average for his age.  Divided attention was high average for his age and level of education.  Performance on a measure of cognitive flexibility and speed was average.  Psychomotor processing speed was average.    Basic visual perception, including matching lines and angles, was above average for his age.  Construction of a clock fell within normal limits.  Nonverbal deductive reasoning was average.    Novel problem-solving, including the ability to generate strategies and solutions, fell within normal limits for his age and level of education.    Immediate recall of verbal narrative material was mildly impaired, with average recall following a 30-minute delay.  On a multiple trial list learning task, immediate recall was low average, with average retention (91%) of the learned material following a 25-minute delay.  Immediate recall of visual material was  average, with average recall following a 25-minute delay.    On the BDI-2, a self-report questionnaire, he endorsed minimal depressive symptomatology.  He endorsed minimal apathy on a scale.  He did not endorse excessive daytime sleepiness or compulsive behaviors on questionnaires.    IMPRESSIONS AND RECOMMENDATIONS    Dion Villavicencio is a 60-year-old man with a history of Parkinson's disease, who underwent bilateral STN DBS lead placement in July 2019, and was seen today in 1 year follow-up to the surgery, delayed due to the pandemic.    Results of this evaluation indicate a relative weakness in learning of verbal information, although he retains information quite well once he has learned it.  Performance otherwise falls well within normal limits across cognitive domains, including language, visual processing, complex attention, and executive functioning. On interview, he reported mild anxiety. He does not report significant depressive symptomatology, apathy, or compulsive behaviors.    Compared to his baseline evaluation on 8/30/2018, he has had improvements in learning of visual information, with learning and memory otherwise remaining stable.  He has had declines in complex attention, although this remains well within normal limits.  Performance is otherwise quite stable across cognitive domains.    This pattern of performance continues to be suggestive of subtle frontal system involvement including a weakness in acquisition of new material.  There is no consistent decline in cognition over time, and he continues to manage his instrumental activities of daily living entirely independently.  These findings do not reflect dementia.    In terms of daily functioning, Mr. Villavicencio's cognitive inefficiencies are not likely to interfere with his ability to actively participate in treatment or to manage his instrumental activities of daily living independently.  Given relative weaknesses in verbal learning, he may benefit from  the use of written reminders or checklists.  He may find it helpful to use a smart phone for alarms or reminders. He endorsed some irritability, anxiety, and worry. He could consider meeting with a health psychologist to address strategies to manage these mild symptoms. He exercises regularly, and perhaps could benefit from the use of relaxation techniques as well.    Results may serve as an updated baseline of his neurocognitive functioning, and the evaluation may be repeated in the future for comparison should a change in mental status occur.    Mary Marsh, Ph.D., [facility]  Licensed Psychologist, LP [id number]  Board Certified in Clinical Neuropsychology    Time spent: One unit professional time, including interview on 3/30/2021 (CPT 71929). 60 minutes (1 unit) neuropsychological testing evaluation by licensed and board-certified neuropsychologist, including integration of patient data, interpretation of standardized test results and clinical data, clinical decision-making, treatment planning, report, and interactive feedback to the patient, first hour (CPT 41579). 73 minutes (1 units) of neuropsychological testing evaluation by licensed and board-certified neuropsychologist, including integration of patient data, interpretation of standardized test results and clinical data, clinical decision-making, treatment planning, report, and interactive feedback to the patient, subsequent hours (CPT 39023). 30 minutes of neuropsychological test administration and scoring by technician, first 30 minutes (CPT 49668). 155 additional minutes (5 units) neuropsychological test administration and scoring by technician, subsequent 30 minutes (CPT 25648). ICD-10 Diagnoses: G20; F06.8.        Mary Marsh, PhD LP

## 2021-04-08 NOTE — NURSING NOTE
The patient was seen for neuropsychological evaluation at the request of Dr. Zainab Bella, for the purposes of diagnostic clarification and treatment planning. 185 minutes of test administration and scoring were provided by this writer, Vazquez Lopez. Please see Dr. Mary Marsh's report for a full interpretation of the findings.

## 2021-04-16 NOTE — PROGRESS NOTES
NEUROPSYCHOLOGICAL EVALUATION    RELEVANT HISTORY AND REASON FOR REFERRAL    Dion Villavicencio is a 60 year old, right handed,  and  with 16 years of formal education. Information was obtained via video interview with the patient and his wife, and review of his medical records, including a prior neuropsychological evaluation. Record indicate a history of Parkinson's disease, diagnosed in 2006. Symptom onset was in 2006 with initial presentation of difficulty letting go of a baseball while throwing it. Over time, his symptoms worsened. He underwent bilateral subthalamic nucleus (STN) deep brain stimulation (DBS) lead placement on 7/9/2019. This neuropsychological evaluation was requested by DIETER Ríos, ELVIA, as part of the standard-of-care one year follow up to surgery, delayed due to the pandemic. Specifically, the evaluation was requested for further characterization of any cognitive difficulties, to assist in determining whether there had been changes since baseline, and to assist with adjusting treatment plans.     Records indicate that Mr. Villavicencio first underwent a neuropsychological evaluation under my direction on 8/30/2018. Please refer to the report from that date for full details regarding his history and the findings of the evaluation. Briefly, results indicated impairments in learning, which were somewhat variable, and memory that fell within normal limits. Performance otherwise fell within normal limits across cognitive domains, generally in the above average range. He did not report experiencing significant depressive symptomatology, anxiety, or apathy.      Precautions are in place related to COVID-19. In an effort to reduce the amount of time spent in the clinic, Mr. Villavicencio and his wife completed the interview portion of the evaluation over video on 3/30/2021, and he was seen in the clinic today to complete the testing portion of the evaluation.    CLINICAL INTERVIEW  FINDINGS    Upon interview, Mr. Villavicencio indicated that surgery went well.  He had both sides done at once and did not have any complications.  He stated that the only challenge was getting it set up and finding the correct spot, and figuring out whether he should be changing the settings.  He has had no adjustments since 2019, until a week ago when he increased the stimulation a little.  He has had good benefit in that his symptoms are much more consistent and predictable, and he is not spiking up and down.    Mr. Villavicencio and his wife have noticed at most subtle changes in cognition.  He is perhaps taking longer to think of words.  He will tend to begin a conversation with someone such as his wife, assuming that she knows what he is talking about, when she does not.  He tends to start projects such as the taxes, but does not always finish them as he is more easily distracted.  He tends to leave things out associated with the projects that he has been working on, such as his tools.  This is a change for him.  He also seems to be more consumed with daily news, and gets worked up by things that he may not have been worked up by before.  He and his wife agree that this may be because he is more isolated due to the pandemic and he is watching television more.  He notices some difficulty remembering events that have happened recently.  He denied difficulty remembering conversations, remembering names of familiar people, or becoming lost in familiar places.  He is not noticing changes in decision-making.    Mr. Villavicencio lives with his wife, and she has always managed their bills.  He manages some aspects of the finances such as their taxes, and he denied difficulty carrying out this responsibility.  He manages his own medications, apparently without difficulty.  He can tell by his symptoms if he is very late with the Parkinson's medications, or if he has eaten protein around the time of the dose.  He takes an extra dose  occasionally during the midday, which his neurologist provided for him.  He uses this if he is shoveling or doing something strenuous.  Sometimes he skips the last dose of the day if he is going to bed earlier.  He drives, generally without difficulty, although he does not trust himself as much in traffic.  He handles his personal cares independently.    Mr. Villavicencio is not working with a psychologist.  He described his mood as all right, but acknowledged that he is more snappy and irritated in the last year.  He has some feelings of sadness and hopelessness which he attributes to the pandemic.  He has not been feeling guilty.  He has had anxiety, and tends to worry more.  He sleeps well using a CPAP, 7 hours a night.  He has some cramping in his left leg, which can affect his sleep.  He may doze off once or twice a week, usually when he is working and his back is sore, so he lies down on the bed and then falls asleep.  His appetite has been good.  He has intentionally lost 25 pounds by walking, stating that he knows that people gain weight with DBS so he has been careful.  He is walking 10,000 steps a day and doing balance and strength exercises.  His interest level is good but he is limited in what he is able to do because of the pandemic.  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  He and his wife have had their first dose of the vaccine.  He denied auditory hallucinations.  He has not had visual hallucinations, although he sometimes sees flashes of light in the corner of his eyes.  He denied suicidal ideation or any history of attempts to commit suicide.  He denied gambling.  He endorsed a history of impulsivity with the medication that he had taken, but this has resolved since he has been off of that medication.  His wife stated that sometimes he can be compulsive in his work, but he is not working now and while he can be compulsive in finishing a project related  to his volunteer work, these are always time-limited so it has not been a problem.  He denied alcohol, tobacco, or illicit drug use.    Since his last evaluation, Mr. Villavicencio has not had any major illnesses or injuries.  His balance has been a little off but he has not been falling.  If he leans forward and tries to grab something he may start to fall and has to catch himself.  As noted, he has had cramping in his left calf.  He experiences headaches if he stares at a screen all day long.    PAST MEDICAL HISTORY: Medical records indicate a history of Parkinson's disease, von Willebrand's disease, esophageal reflux, obstructive sleep apnea.    CURRENT MEDICATIONS:  Include amantadine, carbidopa-levodopa (Sinemet, Rytary), cholecalciferol, ibuprofen, multiple-vitamins-minerals, oxycodone, ropinirole.    FAMILY MEDICAL HISTORY:  Significant for a maternal grandmother who  at 94 with Alzheimer s disease, a mother who may have memory problems at the age of 77, and a maternal uncle with cerebrovascular disease.    BEHAVIORAL OBSERVATIONS    During the evaluation, Mr. Villavicencio was pleasant, cooperative, and seemed to understand the instructions. He was at times impulsive in his responses. He was alert and oriented to person, place, and time. Abnormal movements were observed in his legs. Mood was neutral. Speech was fluent, with normal articulation, volume, and rate. Spontaneous conversation was present and appropriate. Internal performance validity measures fell within normal limits. The results are believed to accurately reflect his current level of functioning.      MEASURES ADMINISTERED    The following measures were administered by a trained psychometrist, under the direct supervision of a licensed psychologist.    Subtests of the Wechsler Adult Intelligence Scale-4; subtests of the Wechsler Memory Scale-Revised; Callahan Verbal Learning Test-Revised, Form ; Brief Visual Memory Test - Revised, Form 2; Ivanhoe Naming Test;  D-KEFS Verbal Fluency, Standard Form; Trail Making Test; Stroop; [place] Card Sorting Test - 64; [place] Judgement of Line Orientation Form H; Clock Drawing; Dementia Rating Scale - 2 (DRS-2) Standard Form;  MoCA v. 7.2; Schrader Depression Inventory-2 (BDI-2), HAM-D, HAM-A, Apathy Scale, RBDSQ; QUIP, PDQ-39, ESS.     RESULTS AND INTERPRETATION    Overall intellectual functioning was estimated to fall in the above average range, consistent with premorbid estimates based on single word reading abilities administered at his prior evaluation.  Performance on a screening measure of dementia was high average (DRS-2 Total Score = 141/144).  Performance on the MoCA included no errors (30/30).    Confrontation naming was above average for his age and level of education.  Verbal abstract reasoning was above average.  Ability to comprehend and articulate responses to complex social situations was above average.  Letter fluency, generative naming category, and switching fluency were all above average.    Attention span was average for his age.  Divided attention was high average for his age and level of education.  Performance on a measure of cognitive flexibility and speed was average.  Psychomotor processing speed was average.    Basic visual perception, including matching lines and angles, was above average for his age.  Construction of a clock fell within normal limits.  Nonverbal deductive reasoning was average.    Novel problem-solving, including the ability to generate strategies and solutions, fell within normal limits for his age and level of education.    Immediate recall of verbal narrative material was mildly impaired, with average recall following a 30-minute delay.  On a multiple trial list learning task, immediate recall was low average, with average retention (91%) of the learned material following a 25-minute delay.  Immediate recall of visual material was average, with average recall following a 25-minute  delay.    On the BDI-2, a self-report questionnaire, he endorsed minimal depressive symptomatology.  He endorsed minimal apathy on a scale.  He did not endorse excessive daytime sleepiness or compulsive behaviors on questionnaires.    IMPRESSIONS AND RECOMMENDATIONS    Dion Villavicencio is a 60-year-old man with a history of Parkinson's disease, who underwent bilateral STN DBS lead placement in July 2019, and was seen today in 1 year follow-up to the surgery, delayed due to the pandemic.    Results of this evaluation indicate a relative weakness in learning of verbal information, although he retains information quite well once he has learned it.  Performance otherwise falls well within normal limits across cognitive domains, including language, visual processing, complex attention, and executive functioning. On interview, he reported mild anxiety. He does not report significant depressive symptomatology, apathy, or compulsive behaviors.    Compared to his baseline evaluation on 8/30/2018, he has had improvements in learning of visual information, with learning and memory otherwise remaining stable.  He has had declines in complex attention, although this remains well within normal limits.  Performance is otherwise quite stable across cognitive domains.    This pattern of performance continues to be suggestive of subtle frontal system involvement including a weakness in acquisition of new material.  There is no consistent decline in cognition over time, and he continues to manage his instrumental activities of daily living entirely independently.  These findings do not reflect dementia.    In terms of daily functioning, Mr. Villavicencio's cognitive inefficiencies are not likely to interfere with his ability to actively participate in treatment or to manage his instrumental activities of daily living independently.  Given relative weaknesses in verbal learning, he may benefit from the use of written reminders or checklists.  He may  find it helpful to use a smart phone for alarms or reminders. He endorsed some irritability, anxiety, and worry. He could consider meeting with a health psychologist to address strategies to manage these mild symptoms. He exercises regularly, and perhaps could benefit from the use of relaxation techniques as well.    Results may serve as an updated baseline of his neurocognitive functioning, and the evaluation may be repeated in the future for comparison should a change in mental status occur.    Mary Marsh, Ph.D., ABPP  Licensed Psychologist, [id number]  Board Certified in Clinical Neuropsychology    Time spent: One unit professional time, including interview on 3/30/2021 (CPT 08058). 60 minutes (1 unit) neuropsychological testing evaluation by licensed and board-certified neuropsychologist, including integration of patient data, interpretation of standardized test results and clinical data, clinical decision-making, treatment planning, report, and interactive feedback to the patient, first hour (CPT 98343). 73 minutes (1 units) of neuropsychological testing evaluation by licensed and board-certified neuropsychologist, including integration of patient data, interpretation of standardized test results and clinical data, clinical decision-making, treatment planning, report, and interactive feedback to the patient, subsequent hours (CPT 78278). 30 minutes of neuropsychological test administration and scoring by technician, first 30 minutes (CPT 18009). 155 additional minutes (5 units) neuropsychological test administration and scoring by technician, subsequent 30 minutes (CPT 63001). ICD-10 Diagnoses: G20; F06.8.

## 2021-04-30 NOTE — PROGRESS NOTES
Patient Dion Villavicencio  [address]    MRN [id number]   Provider      [birth year]   Tech KB    Sex Male   Occupation     Education 16   Language     Preferred Hand Right   [employer]    Age 60                 DEMENTIA RATING SCALE - 2    TEST FORM Standard     Raw MAS       Attention 37 13       Init/Psv 37 11       Construct 6 10       Concept 39 12       Memory 22 7       Total / 144 141 12                WAIS-IV          Raw SS       Similarities 32 14       Comprehension 31 14       Letter Num Seq 17 11       Digit Span 29 11       Matrix Reasoning 21 11                WECHSLER MEMORY SCALE - REVISED         Raw MAS/SS       Info/Orientation 14        LM Immediate 18 6       LM 30 Minute 18 9                [place] NAMING TEST         Score (Correct+Stim Cue)  60 MAS 15     # Correct Stimulus Cue  0 T 62     # Correct Phonemic Cue  0                D-KEFS VERBAL FLUENCY    TEST FORM Standard     Raw SS       Letter Fluency 65 18       Category Fluency 45 13       Switching Fluency             Total Correct 16 14       Switching Accuracy 16 14                CLOCK DRAWING         Command Normal /3 Rating       Copy Normal /3 Rating      TRAIL MAKING TEST          Seconds Errors MAS z     Trails A 29 0 11 0.58     Trails B 59 0 12 0.79              STROOP             Raw    +    Val    = Val Tot.         T  Raw MAS   Word 103 8 111 49 103 10   Color  65 4 69 43 65 8   Color/Word 35 5 40 45 35 9            [place] CARD SORTING TEST - 1 deck        # Categories 5 %ile >16      # Persev Error 4 T 53      Concept. Lev Resp. 57 T 59      FMS 0                 [place] JUDGMENT OF LINE ORIENTATION   TEST FORM H    Raw Score 29 Raw Correction 0     MAS 14 [place] %ile  0              HVLT-R    TEST FORM 1     Raw T       Trial 1 6        Trial 2 7        Trial 3 11        Total 1-3 24 41       Learning 5        Delay 10 51       Percent Retained 91 49       True Positives 12        Discrimination Index 10 44       False  Positives 2                 BRIEF VISUAL MEMORY TEST - REVISED   TEST FORM 2     Raw T       Trial 1 6        Trial 2 9        Trial 3 9        Total 1-3 24 52       Learning 3 46       Delay 9 52       Percent Retained 100 >16 %ile      Recognition Hits 6        Discrimination Index 6 >16 %ile      False Alarms 0                 WANDA COGNITVE ASSESSMENT (MOCA)  TEST FORM 7.2    Score 30 /30                BDI         Score 8        Interpretation Minimal                 APATHY SCALE         Score 5                 PD QUESTIONNAIRES         ESS         RDBSQ         PDQ-39         QUIP                                                                                          MAS = [place] Older Adult Normative Study Age & Ed. Adj. Scaled Score

## 2021-08-02 ENCOUNTER — TELEPHONE (OUTPATIENT)
Dept: HEMATOLOGY | Facility: CLINIC | Age: 61
End: 2021-08-02

## 2021-08-02 NOTE — TELEPHONE ENCOUNTER
RN received a phone call from Dion regarding medical alert and emergencies.     He was wondering how to get a new medical alert bracelet and how to put his diagnosis on his medical alert bracelet. He also wondered what should happen in an emergency.     RN educated Dion about where to go in an emergency, and gave him contact information to provide his wife, and family. [facility] medical [facility].  His diagnosis is Type 1 Von Willebrand Disease. Baseline VWF Activity <20%.     Becky Sanchez, MSN, RN, PHN - Nurse Clinician - [facility] - [phone]

## 2021-08-03 ENCOUNTER — TELEPHONE (OUTPATIENT)
Dept: HEMATOLOGY | Facility: CLINIC | Age: 61
End: 2021-08-03

## 2021-08-03 NOTE — TELEPHONE ENCOUNTER
[facility]  Brief Social Work Note    Dion Villavicencio is a 60 year old male with Type 1 Von Willebrand Disease, with baseline VWF activity <20%.  He also has Parkinson's Disease.  He had contacted the clinic yesterday re: [facility].    Call placed to Doin today in follow-up and was able to speak with him ([phone]). He stated he has an old bracelet that indicates he has a bleeding disorder, but the clasp is broken and the words are worn down. He has a [employer] indicating he has Parkinson's Disease.    Discussed [facility] support for orders via [email or web address].  Website sent to patient to review, and will order on his behalf once he has had time to review.    He did not have other questions or concerns.    Jazmine Delgadillo, TANVI, LICSW, ACM  Clinical   [facility]  Phone: [phone]

## 2021-09-18 ENCOUNTER — HEALTH MAINTENANCE LETTER (OUTPATIENT)
Age: 61
End: 2021-09-18

## 2021-11-12 ENCOUNTER — OFFICE VISIT (OUTPATIENT)
Dept: HEMATOLOGY | Facility: CLINIC | Age: 61
End: 2021-11-12
Attending: PHYSICIAN ASSISTANT
Payer: COMMERCIAL

## 2021-11-12 VITALS
DIASTOLIC BLOOD PRESSURE: 88 MMHG | HEIGHT: 68 IN | OXYGEN SATURATION: 97 % | RESPIRATION RATE: 14 BRPM | SYSTOLIC BLOOD PRESSURE: 130 MMHG | HEART RATE: 80 BPM | BODY MASS INDEX: 30.77 KG/M2 | WEIGHT: 203 LBS | TEMPERATURE: 97.6 F

## 2021-11-12 DIAGNOSIS — G20.A1 PARKINSON'S DISEASE (H): ICD-10-CM

## 2021-11-12 DIAGNOSIS — D68.01 VON WILLEBRAND DISEASE, TYPE I (H): Primary | ICD-10-CM

## 2021-11-12 LAB
ERYTHROCYTE [DISTWIDTH] IN BLOOD BY AUTOMATED COUNT: 12.3 % (ref 10–15)
HCT VFR BLD AUTO: 50.8 % (ref 40–53)
HGB BLD-MCNC: 16.8 G/DL (ref 13.3–17.7)
MCH RBC QN AUTO: 30.8 PG (ref 26.5–33)
MCHC RBC AUTO-ENTMCNC: 33.1 G/DL (ref 31.5–36.5)
MCV RBC AUTO: 93 FL (ref 78–100)
PLATELET # BLD AUTO: 254 10E3/UL (ref 150–450)
RBC # BLD AUTO: 5.45 10E6/UL (ref 4.4–5.9)
WBC # BLD AUTO: 5.9 10E3/UL (ref 4–11)

## 2021-11-12 PROCEDURE — 85245 CLOT FACTOR VIII VW RISTOCTN: CPT | Performed by: PHYSICIAN ASSISTANT

## 2021-11-12 PROCEDURE — 85240 CLOT FACTOR VIII AHG 1 STAGE: CPT | Performed by: PHYSICIAN ASSISTANT

## 2021-11-12 PROCEDURE — 85027 COMPLETE CBC AUTOMATED: CPT | Performed by: PHYSICIAN ASSISTANT

## 2021-11-12 PROCEDURE — 36415 COLL VENOUS BLD VENIPUNCTURE: CPT | Performed by: PHYSICIAN ASSISTANT

## 2021-11-12 PROCEDURE — 99214 OFFICE O/P EST MOD 30 MIN: CPT | Performed by: PHYSICIAN ASSISTANT

## 2021-11-12 PROCEDURE — 85246 CLOT FACTOR VIII VW ANTIGEN: CPT | Performed by: PHYSICIAN ASSISTANT

## 2021-11-12 ASSESSMENT — PAIN SCALES - GENERAL: PAINLEVEL: NO PAIN (0)

## 2021-11-12 ASSESSMENT — MIFFLIN-ST. JEOR: SCORE: 1710.3

## 2021-11-12 NOTE — PROGRESS NOTES
[facility]  [address] [place] [address] [address], [place] [address]  Main: [phone], Fax: [phone]    Patient seen at: [facility] at [address] [place] [address]    Outpatient Visit Note:    Patient: Dion Villavicencio  MRN: [id number]  : 1960  MARY: 2021    Reason:  Severe Type I Von Willebrand Disease. Annual follow up visit.     Clinical History Summary:  Dion Villavicencio is a 61 year old male with Parkinson's disease who also has Type I Von Willebrand Disease with his baseline factor VIII level of 30%; VWF:Ag of 15% and VWF:Acitivty of <20%, returns to clinic today for his routine annual follow up visit. Both Dion's father and his son also has Type I Von Willebrand Disease.      Dion was last seen by Dr. Anish Ma back in 7/15/2019 and by this writer back in 2020.    Dion's father apparently had a history of severe and frequent epistaxis that often required cautery. After his father was diagnosed with Von Willebrand Disease, he then would be treated with a typical agent such as aminocaproic acid and Stimate. Dion's son was also eventually found to have severe Von Willebrand Disease after an injury as a young child. His son apparently fell and injured his lip which then bled severely and required attention in the emergency department. Dion's son was followed by Dr. Twila Alexander, pediatric hematologist at [facility] when he was a child but has lost follow up since he turns 23. Currently he is 29 years of age. Dion's son leaves in the [place] area. Dion reports that his son did use Stimate in the past but he does not belief that a formal DDAVP trial on his son had ever been done.     Dion then eventually is also diagnosed with severe Type I Von Willebrand Disease.     Prior to his diagnosis of Von Willebrand disease, he did undergo wisdom teeth extraction without any excessive bleeding. He did have a laceration to  the left side of his face and orbit in the past that required suturing and did have a large hematoma. He also had injured his left knee in the past and also recalled that he had a large hematoma. He did need cautery twice as a child for epistaxis. He never had any major joint or abdominal surgeries.     He first established care with our clinic back in 9/6/2018 and was seen by Dr. Ma after he was referred by neurosurgery for consultation in regard to bleeding disorder managemetn for deep brain stimulator placement for his Parkinson's disease.      Dion underwent deep brain stimulator placement back in 7/9/2019 for his Parkinson's disease. For that particular surgery, Dion received a dose of VONVENDI (rVWF) at 60 Units/kg the day prior to his surgery. He had as expected recovery on the day of his surgery. He also received a repeat dose of VONVENDI at 40 Units/kg a few hours after his surgery and also a repeat dose on 7/12/2019 just prior to his discharge. Dion did well without any bleeding issues. Dion also had received Humate-P in the past for teeth extraction procedures.     He then saw this writer back in Nov 2020 for bleeding disorder management of a screening colonoscopy. I recommended one dose of Humate-P at 60 RCo Units/kg 30-60 minutes prior to the procedure at the time. I also recommended Amicar x 10 days if any biopsy or polypectomy was done. He did have multiple polypectomies done during this procedure. It is unclear to me if the patient picked up his Amicar after the colonoscopy. The patient and his wife also does not remember. But regardless, he recalls no complications with bleeding. His next colonoscopy is in the year of 2023.     Dion had never undergone formal DDAVP trial.     Interim History:  In the past year, Dion reports no bleeding issues. Dion is not anticipating any invasive or surgical procedures in the near future. However, he thinks that he will eventually needs his stimulator's battery  "change may be at the end of 2022 or early 2023.     ROS:  Denies any frequent epistaxis. No oral mucosal bleeding. Denies any hematuria or blood in stools.    Medication, Allergies and PmHx:  All have been reviewed by this writer in the electronic medical records.    Social History:  . Lives with wife.    Family History:  Father was diagnosed with Von Willebrand Disease.  He has 2 sons: One with Von Willebrand Disease and the other does not.   He also has 2 brothers and were tested negative for Von Willebrand Disease.     Objective:  Pleasant in no acute distress.  Vitals: /88 (BP Location: Right arm, Patient Position: Sitting, Cuff Size: Adult Regular)   Pulse 80   Temp 97.6  F (36.4  C) (Oral)   Resp 14   Ht 1.727 m (5' 8\")   Wt 92.1 kg (203 lb)   SpO2 97%   BMI 30.87 kg/m    Exam:  Complete exam is not performed today.     Labs:  Results for DION AWAD (MRN [id number]) as of 7/16/2019 08:14    Ref. Range 7/9/2019 06:10 7/9/2019 17:01 7/10/2019 06:21 7/11/2019 03:38 7/12/2019 06:52 7/15/2019 07:20   Factor 8 Assay Latest Ref Range: 55 - 200 % 87 126 138 201 (H) 175 105   von Willebrand Antigen Latest Ref Range: 50 - 200 % 109 98 167 155 132 88   von Willebrand Factor Activity Latest Ref Range: 50 - 180 % 80 57 128 106 73 35 (L)           Assessment:  Dion is a 61 year old male with severe Type I Von Willebrand Disease (VWD) with his baseline factor VIII level of 30%; VWF:Ag of 15% and VWF:Acitivty of <20%,)  who also has a history of refractory Parkinson's Disease S/P deep brain stimulator, returns to clinic today for his routine annual follow up clinic visit.      In the past year, Dion has done well without any bleeding diathesis with his daily activities.      Diagnosis:  1. Severe Type I Von Willebrand Disease.   2. Parkinson's disease S/P deep brain stimulator placement in 2019.    Plan:  As expected, from a routine day to day living activity standpoint, Dion has no issues with " frequent bleeding. Thus there are no indications for routine hematological treatment with his day to day living activities. He does, however, will need hematological support with challenges such as surgeries, invasive procedures or traumatic injuries. As his previous surgical / invasive procedures, we have given him a dose of Von Willebrand factor concentrate prior to the procedure. I also discussed with Dion and his wife about adjunct therapy including antifibrinolytics, such as Aminocaproic Acid, or Tranexamic Acid.     Dion is interested in knowing what his Von Willebrand Factor level is today as compared to 2018. This is reasonable and I will recheck his Von Willebrand factor levels today along with a CBC.     He has some questions about medical alert bracelet for which he had spoken to our clinic's  back a few months ago. I encourage him to call our clinic's  if he should have any further questions.     I also encourage Dion and his wife to talk to their son with Von Willebrand Disease to establish care with our clinic as he has had no follow up since he had turn 23 and graduated from [facility] (currently he is 31 years of age).     His next colonoscopy will be in 2023 and he also might need his stimulator battery exchange in 2022 or 2023. Again I have instructed Dion to call our clinic prior to any invasive procedures or if he should experience any unusual bleeding issues.     Otherwise, we will plan on seeing him back every 1-2 years as long as he has no bleeding issues.     33 minutes spent on the date of the encounter doing chart review, history and exam, documentation and further activities per the note.    Time IN: 13:10  Time OUT: 13:32         Vern Ding PA-C, MPAS  Physician Assistant  [facility].

## 2021-11-12 NOTE — LETTER
[facility]  [address] [place] [address] [address], [place] [address]  Main: [phone], Fax: [phone]    Patient seen at: [facility] at [address] [place] [address]    Outpatient Visit Note:    Patient: Dion Villavicencio  MRN: [id number]  : 1960  MARY: 2021    Reason:  Severe Type I Von Willebrand Disease. Annual follow up visit.     Clinical History Summary:  Dion Villavicencio is a 61 year old male with Parkinson's disease who also has Type I Von Willebrand Disease with his baseline factor VIII level of 30%; VWF:Ag of 15% and VWF:Acitivty of <20%, returns to clinic today for his routine annual follow up visit. Both Dion's father and his son also has Type I Von Willebrand Disease.      Dion was last seen by Dr. Anish Ma back in 7/15/2019 and by this writer back in 2020.    Dion's father apparently had a history of severe and frequent epistaxis that often required cautery. After his father was diagnosed with Von Willebrand Disease, he then would be treated with a typical agent such as aminocaproic acid and Stimate. Dion's son was also eventually found to have severe Von Willebrand Disease after an injury as a young child. His son apparently fell and injured his lip which then bled severely and required attention in the emergency department. Dion's son was followed by Dr. Twila Alexander, pediatric hematologist at [facility] when he was a child but has lost follow up since he turns 23. Currently he is 29 years of age. Dion's son leaves in the [place] area. Dion reports that his son did use Stimate in the past but he does not belief that a formal DDAVP trial on his son had ever been done.     Dion then eventually is also diagnosed with severe Type I Von Willebrand Disease.     Prior to his diagnosis of Von Willebrand disease, he did undergo wisdom teeth extraction without any excessive bleeding. He did have a laceration  to the left side of his face and orbit in the past that required suturing and did have a large hematoma. He also had injured his left knee in the past and also recalled that he had a large hematoma. He did need cautery twice as a child for epistaxis. He never had any major joint or abdominal surgeries.     He first established care with our clinic back in 9/6/2018 and was seen by Dr. Ma after he was referred by neurosurgery for consultation in regard to bleeding disorder managemetn for deep brain stimulator placement for his Parkinson's disease.      Dion underwent deep brain stimulator placement back in 7/9/2019 for his Parkinson's disease. For that particular surgery, Dion received a dose of VONVENDI (rVWF) at 60 Units/kg the day prior to his surgery. He had as expected recovery on the day of his surgery. He also received a repeat dose of VONVENDI at 40 Units/kg a few hours after his surgery and also a repeat dose on 7/12/2019 just prior to his discharge. Doin did well without any bleeding issues. Dion also had received Humate-P in the past for teeth extraction procedures.     He then saw this writer back in Nov 2020 for bleeding disorder management of a screening colonoscopy. I recommended one dose of Humate-P at 60 RCo Units/kg 30-60 minutes prior to the procedure at the time. I also recommended Amicar x 10 days if any biopsy or polypectomy was done. He did have multiple polypectomies done during this procedure. It is unclear to me if the patient picked up his Amicar after the colonoscopy. The patient and his wife also does not remember. But regardless, he recalls no complications with bleeding. His next colonoscopy is in the year of 2023.     Dion had never undergone formal DDAVP trial.     Interim History:  In the past year, Dion reports no bleeding issues. Dion is not anticipating any invasive or surgical procedures in the near future. However, he thinks that he will eventually needs his stimulator's  "battery change may be at the end of 2022 or early 2023.     ROS:  Denies any frequent epistaxis. No oral mucosal bleeding. Denies any hematuria or blood in stools.    Medication, Allergies and PmHx:  All have been reviewed by this writer in the electronic medical records.    Social History:  . Lives with wife.    Family History:  Father was diagnosed with Von Willebrand Disease.  He has 2 sons: One with Von Willebrand Disease and the other does not.   He also has 2 brothers and were tested negative for Von Willebrand Disease.     Objective:  Pleasant in no acute distress.  Vitals: /88 (BP Location: Right arm, Patient Position: Sitting, Cuff Size: Adult Regular)   Pulse 80   Temp 97.6  F (36.4  C) (Oral)   Resp 14   Ht 1.727 m (5' 8\")   Wt 92.1 kg (203 lb)   SpO2 97%   BMI 30.87 kg/m    Exam:  Complete exam is not performed today.     Labs:  Results for DION AWAD (MRN [id number]) as of 7/16/2019 08:14    Ref. Range 7/9/2019 06:10 7/9/2019 17:01 7/10/2019 06:21 7/11/2019 03:38 7/12/2019 06:52 7/15/2019 07:20   Factor 8 Assay Latest Ref Range: 55 - 200 % 87 126 138 201 (H) 175 105   von Willebrand Antigen Latest Ref Range: 50 - 200 % 109 98 167 155 132 88   von Willebrand Factor Activity Latest Ref Range: 50 - 180 % 80 57 128 106 73 35 (L)           Assessment:  Dion is a 61 year old male with severe Type I Von Willebrand Disease (VWD) with his baseline factor VIII level of 30%; VWF:Ag of 15% and VWF:Acitivty of <20%,)  who also has a history of refractory Parkinson's Disease S/P deep brain stimulator, returns to clinic today for his routine annual follow up clinic visit.      In the past year, Dion has done well without any bleeding diathesis with his daily activities.      Diagnosis:  1. Severe Type I Von Willebrand Disease.   2. Parkinson's disease S/P deep brain stimulator placement in 2019.    Plan:  As expected, from a routine day to day living activity standpoint, Dion has no issues " with frequent bleeding. Thus there are no indications for routine hematological treatment with his day to day living activities. He does, however, will need hematological support with challenges such as surgeries, invasive procedures or traumatic injuries. As his previous surgical / invasive procedures, we have given him a dose of Von Willebrand factor concentrate prior to the procedure. I also discussed with Dion and his wife about adjunct therapy including antifibrinolytics, such as Aminocaproic Acid, or Tranexamic Acid.     Dion is interested in knowing what his Von Willebrand Factor level is today as compared to 2018. This is reasonable and I will recheck his Von Willebrand factor levels today along with a CBC.     He has some questions about medical alert bracelet for which he had spoken to our clinic's  back a few months ago. I encourage him to call our clinic's  if he should have any further questions.     I also encourage Dion and his wife to talk to their son with Von Willebrand Disease to establish care with our clinic as he has had no follow up since he had turn 23 and graduated from [facility] (currently he is 31 years of age).     His next colonoscopy will be in 2023 and he also might need his stimulator battery exchange in 2022 or 2023. Again I have instructed Dion to call our clinic prior to any invasive procedures or if he should experience any unusual bleeding issues.     Otherwise, we will plan on seeing him back every 1-2 years as long as he has no bleeding issues.     33 minutes spent on the date of the encounter doing chart review, history and exam, documentation and further activities per the note.    Time IN: 13:10  Time OUT: 13:32         Vern Ding PA-C, MPAS  Physician Assistant  [facility].

## 2021-11-15 LAB
FACT VIII ACT/NOR PPP: 36 % (ref 55–200)
VWF AG ACT/NOR PPP IA: 19 % (ref 50–200)
VWF:AC ACT/NOR PPP IA: <19 % (ref 50–180)

## 2021-11-17 ENCOUNTER — TELEPHONE (OUTPATIENT)
Dept: HEMATOLOGY | Facility: CLINIC | Age: 61
End: 2021-11-17
Payer: COMMERCIAL

## 2021-11-17 NOTE — TELEPHONE ENCOUNTER
[facility]  Brief Social Work Note    Writer had several contacts with patient to discuss various medicalert options.  Patient has created an account within [employer] and made his choice for jewlery order.  If patient chooses to order on his own, writer can submit receipt to [facility] on his behalf.  If patient prefers that writer order on his behalf, writer will do so.  Await pt decision on medicalert order.    Jazmine Delgadillo, TANVI, LICSW, ACM  Clinical   [facility]  Phone: [phone]    1/19/22 Addendum:  Pt has chosen medicalert order and request for reimbursement was sent to [facility] on his behalf.  It was approved.

## 2021-11-18 LAB — VWF:RCO ACT/NOR PPP PL AGG: < 10 %

## 2022-01-08 ENCOUNTER — HEALTH MAINTENANCE LETTER (OUTPATIENT)
Age: 62
End: 2022-01-08

## 2022-02-15 ENCOUNTER — TELEPHONE (OUTPATIENT)
Dept: NEUROLOGY | Facility: CLINIC | Age: 62
End: 2022-02-15
Payer: COMMERCIAL

## 2022-02-15 NOTE — TELEPHONE ENCOUNTER
[facility] Call Center    Phone Message    May a detailed message be left on voicemail: yes     Reason for Call: Other: has questions about DBS programming. Please call patient.     Action Taken: Other: [facility]    Travel Screening: Not Applicable

## 2022-02-15 NOTE — TELEPHONE ENCOUNTER
Patient stated he had initial programming with ELVIA Hansen. Patient stated he is not happy with his current neurologist. Patient stated he is having left sided tremor and it seems his current neurologist is not doing much for it. The patient stated he would like to come back to see Jade for a programming visit. The patient requested for a Friday appointment and was scheduled for 3/11 at 1:10 PM.

## 2022-02-15 NOTE — TELEPHONE ENCOUNTER
The patient called back to change to a sooner appointment on 2/22 at 8:30 AM or 1:55. The patient was informed that due Jade's schedule change he should pick the 8:30 AM instead as that will give him more time with Jade. Patient stated he will double check with his wife but then to get him scheduled on 2/22 at 8:30 and then he will call back if it needs to be changed again.

## 2022-02-17 PROBLEM — Z92.89 H/O MAGNETIC RESONANCE IMAGING OF BRAIN AND BRAIN STEM: Status: ACTIVE | Noted: 2018-08-31

## 2022-02-22 ENCOUNTER — MYC MEDICAL ADVICE (OUTPATIENT)
Dept: NEUROLOGY | Facility: CLINIC | Age: 62
End: 2022-02-22

## 2022-02-22 ENCOUNTER — OFFICE VISIT (OUTPATIENT)
Dept: NEUROLOGY | Facility: CLINIC | Age: 62
End: 2022-02-22
Payer: COMMERCIAL

## 2022-02-22 VITALS
OXYGEN SATURATION: 99 % | SYSTOLIC BLOOD PRESSURE: 120 MMHG | BODY MASS INDEX: 32.08 KG/M2 | HEART RATE: 88 BPM | RESPIRATION RATE: 16 BRPM | DIASTOLIC BLOOD PRESSURE: 84 MMHG | WEIGHT: 211 LBS

## 2022-02-22 DIAGNOSIS — G20.A1 PARKINSON'S DISEASE (H): Primary | ICD-10-CM

## 2022-02-22 DIAGNOSIS — Z96.89 S/P DEEP BRAIN STIMULATOR PLACEMENT: ICD-10-CM

## 2022-02-22 PROCEDURE — 95984 ALYS BRN NPGT PRGRMG ADDL 15: CPT | Performed by: NURSE PRACTITIONER

## 2022-02-22 PROCEDURE — 99202 OFFICE O/P NEW SF 15 MIN: CPT | Mod: 25 | Performed by: NURSE PRACTITIONER

## 2022-02-22 PROCEDURE — 95983 ALYS BRN NPGT PRGRMG 15 MIN: CPT | Performed by: NURSE PRACTITIONER

## 2022-02-22 ASSESSMENT — UNIFIED PARKINSONS DISEASE RATING SCALE (UPDRS)
RIGIDITY_RUE: NORMAL
RIGIDITY_NECK: MILD: RIGIDITY DETECTED WITHOUT THE ACTIVATION MANEUVER.  FULL RANGE OF MOTION IS EASILY ACHIEVED.
RIGIDITY_RLE: SLIGHT: RIGIDITY ONLY DETECTED WITH ACTIVATION MANEUVER.
FINGER_TAPPING_LEFT: NORMAL
SPEECH: NORMAL
AMPLITUDE_RUE: NORMAL: NO TREMOR.
CONSTANCY_TREMOR_ATREST: MODERATE: TREMOR AT REST IS PRESENT 51-75% OF THE ENTIRE EXAMINATION PERIOD.
AMPLITUDE_LLE: MILD > 1 CM BUT < 3 CM IN MAXIMAL AMPLITUDE.
PRONATION_SUPINATION_RIGHT: NORMAL
POSTURAL_STABILITY: NORMAL:  RECOVERS WITH ONE OR TWO STEPS.
TOTAL_SCORE: 20
HANDMOVEMENTS_RIGHT: SLIGHT: ANY OF THE FOLLOWING: A) THE REGULAR RHYTHM IS BROKEN WITH ONE WITH ONE OR TWO INTERRUPTIONS OR HESITATIONS OF THE MOVEMENT B) SLIGHT SLOWING C) THE AMPLITUDE DECREMENTS NEAR THE END OF THE 10 MOVEMENTS.
AXIAL_SCORE: 6
DYSKINESIAS_PRESENT: NO
RIGIDITY_LUE: NORMAL
PRONATION_SUPINATION_LEFT: NORMAL
TOTAL_SCORE: 4
AMPLITUDE_RLE: NORMAL: NO TREMOR.
HANDMOVEMENTS_LEFT: SLIGHT: ANY OF THE FOLLOWING: A) THE REGULAR RHYTHM IS BROKEN WITH ONE WITH ONE OR TWO INTERRUPTIONS OR HESITATIONS OF THE MOVEMENT B) SLIGHT SLOWING C) THE AMPLITUDE DECREMENTS NEAR THE END OF THE 10 MOVEMENTS.
TOTAL_SCORE_LEFT: 7
TOETAPPING_LEFT: MODERATE: ANY OF THE FOLLOWING:  A) MORE THAN 5 INTERRUPTIONS OR AT LEAST ONE LONGER ARREST (FREEZE) IN ONGOING MOVEMENT  B) MODERATE SLOWING C) THE AMPLITUDE DECREMENTS STARTING AFTER THE FIRST MOVEMENT.
AMPLITUDE_LUE: NORMAL: NO TREMOR.
AMPLITUDE_LIP_JAW: NORMAL: NO TREMOR.
PARKINSONS_MEDS: ON
TOETAPPING_RIGHT: MILD: ANY OF THE FOLLOWING: A) 3 TO 5 INTERRUPTIONS DURING TAPPING B) MILD SLOWING C) THE AMPLITUDE DECREMENTS MIDWAY IN THE 10-MOVEMENT SEQUENCE
FINGER_TAPPING_RIGHT: NORMAL
LEG_AGILITY_LEFT: NORMAL
SPONTANEITY_OF_MOVEMENT: 0: NORMAL.  NO PROBLEMS.
GAIT: NORMAL
LEG_AGILITY_RIGHT: NORMAL
ARISING_CHAIR: NORMAL: ABLE TO ARISE QUICKLY WITHOUT HESITATION.
FREEZING_GAIT: NORMAL
FACIAL_EXPRESSION: MODERATE: MASKED FACIES WITH LIPS PARTED SOME OF THE TIME WHEN THE MOUTH IS AT REST.
POSTURE: 1 SLIGHT.  NOT QUITE ERECT BUT COULD BE NORMAL FOR OLDER PERSONS.
RIGIDITY_LLE: NORMAL

## 2022-02-22 ASSESSMENT — PAIN SCALES - GENERAL: PAINLEVEL: MILD PAIN (3)

## 2022-02-22 NOTE — PATIENT INSTRUCTIONS
Dear Cecy Dion Villavicencio,    Thank you for coming today.  During your visit, we have discussed the following:     __ Your DBS electrical system function (impedance) is normal. The battery life is also good.    __ You have 2 programs.    Program 1 = Is the old program you had since 2019.  Program 2 = New program    No changes made in the Left Brain (Right Body)    Right Brain (Left Body)  Current is set at 2.4 mA    You have a range 0.0 - 3.0 mA    I don't anticipate side effects.  But, if you experience side effects, 1) you can turn it down, 2) you can switch to Program 2 and 3) you can turn it off and see if it resolves.      __ Please contact me with questions, if you experience side effects, or anytime as needed.     To contact our clinic, you may call [phone] or use [employer] message.     It's good to see you!     DIETER Bacon, CNP  [facility]

## 2022-02-22 NOTE — PROGRESS NOTES
"MOVEMENT DISORDERS CLINIC    PATIENT: Dion Villavicencio    : 1960    MARY: 2022    REASON FOR VISIT: Deep Brain Stimulation (DBS) Programming Optimization follow up for Parkinson's disease (PD).    HPI:  Mr. Dion Villavicencio is a 61 year old right - handed adult who came to the [facility] neurology clinic accompanied by his wife for DBS programming optimization. He is s/p Bilateral Subthalamic Nucleus (STN) DBS lead placement on 2019 and left chest infraclavicular Infinity 7 generator placement on 2019 by Dr. Cardozo. I saw him last in the clinic on 2019 for a follow up visit. During that visit, he was sent back to resume his PD management by Dr. England.    He called on  to return back to [facility] for DBS programming optimization to improve LLE tremors.     See below for the telephone note by Vincenzo Waldrop CMA:-    \"Patient stated he had initial programming with ELVIA Hansen. Patient stated he is not happy with his current neurologist. Patient stated he is having left sided tremor and it seems his current neurologist is not doing much for it. The patient stated he would like to come back to see Jade for a programming visit. The patient requested for a Friday appointment and was scheduled for 3/11 at 1:10 PM.    I reviewed his visits with Dr. England and Rosa Isela Schafer RN. His last visit was on 10/8/2021.  During that visit, his symptoms were well controlled. No medication or DBS changes were made.    Today patient reports that overall his motor symptoms have been fairly well controlled up until a few months ago.  Epic record shows that he called the [facility] in May 2021 to report left lower leg tremors.  At that point he was instructed to increase the right STN DBS to improve left leg tremors.  He also reported balance problems, and was referred to PT.    Patient reports gradual onset of cramping in his left leg.  He was using Sinemet 25/100 mg IR as needed.  However, he has not been " "using this as the cramping is constant.  He reports tremors also  getting worse gradually over the last several months. In the last 3 weeks, tremors came with a vengeance.  \"I do not know why.\"  He has LLE tremors an hour before his dose and it wakes him up in the middle of the night.     He tried to turn up his Right STN DBS and reports having persistent numbness and tight. \"Is that normal?\"    When he was asked if he has talked to the nurses at [place] to be programmed or Dr. England about his worsening LLE tremors, he said no. He came to  [facility] because his initial programming was completed here, and \"I feel comfortable here.\"     Patient has continued staying active.  He is leading a PD support group.  He is walking about 10,000 steps per day.       PD Medications 7:30am  11:30am  3:30pm  7:30pm  11:30pm PRN   Ropinirole 2 mg   1    1    1    Rytary 145 mg CR  2 2 2 2 2    Sinemet 25/100 mg IR      About  5-6x/week     Last antiparkinsonian dose taken:  4:45 am    PHYSICAL EXAM:    Vital Signs:  Blood pressure 120/84, pulse 88, resp. rate 16, weight 95.7 kg (211 lb), SpO2 99 %. Body mass index is 32.08 kg/m .    General:  Mr. Villavicencio is a pleasant  male who is well-groomed, well-developed and overweight sitting comfortably in the exam room without any distress.  Affect is appropriate.    MOVEMENT DISORDERS ASSESSMENT:    UPDRS III   UPDRS Values 9/25/2019 2/22/2022   Time: 10:56 AM 8:55 AM   Medication On On (At end of dose)   R Brain DBS: On On   L Brain DBS: On On   Dyskinesia (LID)  No   Speech 0 0   Facial Expression 1 3   Rigidity Neck 2 2   Rigidity RUE 0 0   Rigidity LUE 1 0   Rigidity RLE 0 1   Rigidity LLE 0 0   Finger Taps R 0 0   Finger Taps L 1 0   Hand Mvt R 0 1   Hand Mvt L 1 1   Pron-/Supinate R 0 0   Pron-/Supinate L 1 0   Toe Tap R 0 2   Toe Tap L 1 3   Leg Agility R 0 0   Leg Agility L 0 0   Arise From Chair 0 0   Gait 0 0   Gait Freezing 0 0   Postural Stability 0 0   Posture 1 1 "   Global Spont Mvt 0 0   Postural Tremor RUE 1 0   Postural Tremor LUE 0 0   Kinetic Tremor RUE 0 0   Kinetic Tremor LUE 1 1   Rest Tremor RUE 0 0   Rest Tremor LUE 0 0   Rest Tremor RLE 0 0   Rest Tremor LLE 0 2   Rest Tremor Lip/Jaw 0 0   Rest Tremor Constancy 0 3   Total Right 1 4   Total Left 6 7   Axial Total 4 6   Total 11 20         DBS Interrogation & Analysis:    DBS Interrogation & Analysis:  Implanted generator:  Left chest Infinity 7   Lead placement:  Bilateral Subthalamic Nucleus (STN)  Lead placement date:  7/9/2019  Generator placement date:  7/11/2019     Battery Service Life:  2.95 volts.     Left Brain  C +, 3abc -  Amplitude:  1.95 mA  PW:  60 msec  Rate:  130 Hz     Therapy impedance: 837 Ohms     Patient :  Upper Limit: 2.50 mA  Lower Limit: 0.00 mA     Electrode Impedance Check:   Left Lead: No Problems Found.       Right Brain  C +, 10abc -  Amplitude:  1.95 mA  PW:  60 msec  Rate:  130 Hz     Therapy impedance:  1012 Ohms     Patient :  Upper Limit: 2.50 mA  Lower Limit: 1.50 mA     Electrode Impedance Check:  Right Lead: No Problems Found.      See scanned report for impedance details      DBS Programming  __Monopolar survey from 8/16/2021 reviewed    __Program 2 Created using C +, 11abc - // PW:  60 msec // Rate:  130 Hz    __Current slowly increased to 1.5 mA. LLE tremor improved, but still present.  __2.0 mA, LLE tremor stopped. However, it slightly recurred when pt started talking.   __2.4 mA LLE tremor stopped.  It only occurred intermittently with mental activation.    Pt took medication at 9:15 am.      He stood up and stated that his left side of his face/mouth was pulling/twitching when talking.  Pulling/twitching wasn't consistent even while he was talking. It didn't look like capsular side effect as symptom was distractible.      __The pulling would stop if not talking.     Program was switched to Program 1 without telling patient.     __The mouth pulling and  twitching continued when talking and eventually resolved without any intervention.    __After pt stated resolution of the left face/mouth pulling/twitching, he was blinded and switched back to Program 2.  No pulling or twitching recurred.    __  Patient and wife were informed the experiment I did above.  I informed his that I didn't think his symptoms were induced by the DBS and could be from stress.     Final Settings:     Patient left in Program 2.    Left STN    No changes made.  Right STN    C +, 10abc -  Amplitude:  1.95 mA  PW:  60 msec  Rate:  130 Hz    Therapy impedance:  962 Ohms       Patient :    No changes made.      Patient :  Upper Limit: 3.0  mA  Lower Limit: 0.0  mA       Today I spent 90 minutes caring for the patient. 70 minutes was spent in DBS programming.  20 minutes was spent with reviewing records, meeting with the patient, answering questions, examining, and documentation.     DIETER Bacon, CNP  [facility]

## 2022-02-22 NOTE — NURSING NOTE
Chief Complaint   Patient presents with     DBS Programming     ump programming     Kimberly Houston

## 2022-02-25 ENCOUNTER — OFFICE VISIT (OUTPATIENT)
Dept: NEUROLOGY | Facility: CLINIC | Age: 62
End: 2022-02-25
Payer: COMMERCIAL

## 2022-02-25 VITALS
DIASTOLIC BLOOD PRESSURE: 95 MMHG | RESPIRATION RATE: 16 BRPM | SYSTOLIC BLOOD PRESSURE: 133 MMHG | WEIGHT: 211 LBS | BODY MASS INDEX: 32.08 KG/M2 | HEART RATE: 95 BPM | OXYGEN SATURATION: 96 %

## 2022-02-25 DIAGNOSIS — G20.A1 PARKINSON'S DISEASE (H): Primary | ICD-10-CM

## 2022-02-25 DIAGNOSIS — Z96.89 S/P DEEP BRAIN STIMULATOR PLACEMENT: ICD-10-CM

## 2022-02-25 PROCEDURE — 99215 OFFICE O/P EST HI 40 MIN: CPT | Performed by: NURSE PRACTITIONER

## 2022-02-25 PROCEDURE — 95983 ALYS BRN NPGT PRGRMG 15 MIN: CPT | Performed by: NURSE PRACTITIONER

## 2022-02-25 PROCEDURE — 95984 ALYS BRN NPGT PRGRMG ADDL 15: CPT | Performed by: NURSE PRACTITIONER

## 2022-02-25 ASSESSMENT — PAIN SCALES - GENERAL: PAINLEVEL: NO PAIN (1)

## 2022-02-25 NOTE — PROGRESS NOTES
MOVEMENT DISORDERS CLINIC    PATIENT: Dion Villavicencio    : 1960    MARY: 2022    REASON FOR VISIT: Deep Brain Stimulation (DBS) Programming Optimization follow up for Parkinson's disease (PD).    HPI:  Mr. Dion Villavicencio is a 61 year old right - handed adult who came to the [facility] neurology clinic accompanied by his wife for DBS programming optimization. He is s/p Bilateral Subthalamic Nucleus (STN) DBS lead placement on 2019 and left chest infraclavicular Infinity 7 generator placement on 2019 by Dr. Cardozo.     I saw him last in the clinic on 2022 for DBS programming. At     This moring the LLE tremor woke him up.  He started taking the 1st dose at 9:15 am so that he would be fully ON for today's visit as instructed. However, today, he doesn't feel like he is fully on as he has continued to shake in LLE and infrequently in Rt hand.     His wife asked if there is a way to deliver therapy just to LLE.  Pt and his wife asked if the DBS has been optimized and if he has to talk to Dr. England about adjusting his medication to improve LLE tremors.     PD Medications 7:30am  11:30am  3:30pm  7:30pm  11:30pm PRN   Ropinirole 2 mg   1    1    1    Rytary 145 mg CR  2 2 2 2 2    Sinemet 25/100 mg IR      About  5-6x/week     Last antiparkinsonian dose taken:  9:15 am - He took Ropinirole 2 mg & Rytary 145 mg CR    PHYSICAL EXAM:    Vital Signs:  Blood pressure (!) 133/95, pulse 95, resp. rate 16, weight 95.7 kg (211 lb), SpO2 96 %. Body mass index is 32.08 kg/m .     General:  Mr. Villavicencio is a pleasant  male who is well-groomed, well-developed and overweight sitting comfortably in the exam room without any distress.  Affect is appropriate.    DBS Interrogation & Analysis:  Implanted generator:  Left chest Infinity 7   Lead placement:  Bilateral Subthalamic Nucleus (STN)  Lead placement date:  2019  Generator placement date:  2019    Battery Service Life:  2.95 volts.     PROGRAM  "1 - ACTIVE    Left Brain  C +, 3abc -  Amplitude:  1.95 mA  PW:  60 msec  Rate:  130 Hz     Therapy impedance: 887 Ohms     Patient :  Upper Limit: 2.50 mA  Lower Limit: 0.00 mA     Electrode Impedance Check:   Left Lead: No Problems Found.       Right Brain  C +, 10abc -  Amplitude:  1.95 mA  PW:  60 msec  Rate:  130 Hz     Therapy impedance:  987 Ohms     Patient :  Upper Limit: 2.50 mA  Lower Limit: 1.50 mA     Electrode Impedance Check:  Right Lead: No Problems Found.      See scanned report for impedance details      DBS Programming    __ In Program 1 -- Patient had tremors in LLE - mild and persistent.    __ Program 2 Activated -- LLE tremor resolved. He walked and stated that he has some tightness in left hip and leg that \"could be from shoveling yesterday.\"    __ Program 1 Activated -- Persistent LLE recurred. Walking \"is fine.\" No side effects.    __ Program 2 Activated -- No tremor in LLE. He reports no hip tightness.  \"It's fine.\" No side effects.    __ Created Program 3 - using C +, 11abc - // PW:  60 msec // Rate:  130 Hz (same as Program 2.)  His wife asked what settings I used to create program 3.  I informed patient and his wife it is best that I keep the programming blinded for the timing.  This is to get a clear understanding of his side effects, and if they are induced by DBS.    __ At 0.0 mA, LLE tremor is much worse. Current was slowly increased to 2.4 mA.     __ Patient waited and no side effect. By the time he left, it was over 2 hours after taking his medication. The LLE tremor was recurring intermittently. Pt kept asking why am I shaking. He stated that when he left the clinic during his last programming visit, his tremors were completley resolved.     __  Patient was reassured that sometimes medications may not kick in like they should and it's hard to base any decision on one day tremor.  I recommended monitoring and seeing if there is a pattern of his meds not kicking " in.    __  I recommended trying her on trying DBS programming optimization before increasing his medications.    __The plan is to stay on program 3.  If patient experiences side effects he can go back to program 1.    __Another option would be to go to Program 2. Start at 1.0 mA and increase the current by 0.1 mA increments.  A third option would be to try high-frequency to suppress tremors.  Since there is unclear and inconsistent side effects it might be difficult to optimize DBS programming.  Although the threshold for C+ 11- was around 4.0 mA, patient was getting significant abnormal movements that was hindering him from walking or doing anything even at 1.0 mA.    Final Settings:   Patient left in Program 3    Left STN    No changes made.  Right STN    C +, 11abc -  Amplitude:  2.4 mA  PW:  60 msec  Rate:  130 Hz       Patient :    No changes made.      Patient :  Upper Limit: 3.0  mA  Lower Limit: 0.0  mA       Today I spent 65 minutes caring for the patient. 45 minutes was spent in DBS programming.  20 minutes was spent with reviewing DBS programming records, meeting with the patient, answering questions, examining, and documentation.     Jade Bella APRN, CNP  [facility]

## 2022-02-25 NOTE — NURSING NOTE
Chief Complaint   Patient presents with     RECHECK     DBS PROGRAMMING - f/u per Jade Dominguez

## 2022-02-26 NOTE — PATIENT INSTRUCTIONS
Dear Mr. Dion Villavicencio,    Thank you for coming today.  During your visit, we have discussed the following:     __ Your DBS electrical system function (impedance) is normal. The battery life is also good.    __ You have 3 Programs.    __  You have left the clinic with Program 3 (new program from today)    The current is set at 2.4 mA. If no side effects, stay here. You have a range from 0.0 - 3.0 mA    __  If you experience a side effect that you can't tolerate, go to Program 1    __  If you experience a side effect that you can tolerate, reduce the current to 2.2 mA, 2.0 mA and continue to go down until the side effect has resolved.    __ Another option is to go back to Program 2 and to start at 1.0 mA and increase the current by 0.1 mA daily and see if you could get to 2.0 mA and stay there or continue to push it to 2.4 mA.    __  A third option is to try a high programming to suppress tremors.     __ Please call/send a [employer] message if your symptoms worsen or you experience side effects.    __ Stay on the same antiparkinsonian medications.    To contact our clinic, you may call [phone] or use [employer] message.     It's good to see you!      DIETER Bacon, CNP  [facility]

## 2022-11-20 ENCOUNTER — HEALTH MAINTENANCE LETTER (OUTPATIENT)
Age: 62
End: 2022-11-20

## 2022-12-12 ENCOUNTER — VIRTUAL VISIT (OUTPATIENT)
Dept: HEMATOLOGY | Facility: CLINIC | Age: 62
End: 2022-12-12
Attending: PHYSICIAN ASSISTANT
Payer: COMMERCIAL

## 2022-12-12 DIAGNOSIS — D68.01 VON WILLEBRAND DISEASE, TYPE I (H): Primary | ICD-10-CM

## 2022-12-12 DIAGNOSIS — G20.A1 PARKINSON'S DISEASE (H): ICD-10-CM

## 2022-12-12 PROCEDURE — 99213 OFFICE O/P EST LOW 20 MIN: CPT | Mod: GT | Performed by: PHYSICIAN ASSISTANT

## 2022-12-12 RX ORDER — CELECOXIB 200 MG/1
200 CAPSULE ORAL DAILY
Qty: 30 CAPSULE | Refills: 2 | Status: SHIPPED | OUTPATIENT
Start: 2022-12-12 | End: 2023-05-02

## 2022-12-12 NOTE — PROGRESS NOTES
[facility]  [address] [place] [address] [address], [place] [address]  Main: [phone], Fax: [phone]      Video Virtual Visit Note:    Patient: Dion Villavicencio  MRN: [id number]  : 1960  MARY: 2022      Due to the ongoing COVID-19 outbreak, this visit was conducted by video, with the patient's approval.      Reason of today's visit:  Severe Type I Von Willebrand Disease. Annual follow up visit.      Clinical History Summary:  Dion Villavicencio is a 62 year old male with Parkinson's disease who also has Type I Von Willebrand Disease with his baseline factor VIII level of 30%; VWF:Ag of 15% and VWF:Acitivty of <20% (done back in 2018), returns to clinic today for his routine annual follow up visit. Both Dion's father and his son also has Type I Von Willebrand Disease.      Dion was last seen by Dr. Anish Ma back in 7/15/2019 and by this writer back in 2021.    Bleeding History Summary:    Dion's father apparently had a history of severe and frequent epistaxis that often required cautery. After his father was diagnosed with Von Willebrand Disease, he then would be treated with a typical agent such as aminocaproic acid and Stimate. Dion's son was also eventually found to have severe Von Willebrand Disease after an injury as a young child. His son apparently fell and injured his lip which then bled severely and required attention in the emergency department. Dion's son was followed by Dr. Twila Alexander, pediatric hematologist at [facility] when he was a child but has lost follow up since he turns 21. Currently he is in his 30's. Dion's son leaves in the [place] area. Dion reports that his son did use Stimate in the past but he does not belief that a formal DDAVP trial on his son had ever been done.     Dion then eventually is also diagnosed with severe Type I Von Willebrand Disease I belief was around 2018.    Prior to his diagnosis of Von  Willebrand disease, he did undergo wisdom teeth extraction without any excessive bleeding. He did have a laceration to the left side of his face and orbit in the past that required suturing and did have a large hematoma. He also had injured his left knee in the past and also recalled that he had a large hematoma. He did need cautery twice as a child for epistaxis. He never had any major joint or abdominal surgeries.     He first established care with our clinic back in 9/6/2018 and was seen by Dr. Ma after he was referred by neurosurgery for consultation in regard to bleeding disorder managemetn for deep brain stimulator placement for his Parkinson's disease.     Dion underwent deep brain stimulator placement back in 7/9/2019 for his Parkinson's disease. For that particular surgery, Dion received a dose of VONVENDI (rVWF) at 60 Units/kg the day prior to his surgery. He had as expected recovery on the day of his surgery. He also received a repeat dose of VONVENDI at 40 Units/kg a few hours after his surgery and also a repeat dose on 7/12/2019 just prior to his discharge. Dion did well without any bleeding issues. Dion also had received Humate-P in the past for teeth extraction procedures.     He then saw this writer back in Nov 2020 for bleeding disorder management of a screening colonoscopy. I recommended one dose of Humate-P at 60 RCo Units/kg 30-60 minutes prior to the procedure at the time. I also recommended Amicar x 10 days if any biopsy or polypectomy was done. He did have multiple polypectomies done during this procedure. It is unclear to me if the patient picked up his Amicar after the colonoscopy. The patient and his wife also does not remember. But regardless, he recalls no complications with bleeding. His next colonoscopy is in the year of 2023.      Dion had never undergone formal DDAVP trial.      Interim History:  In the past year, Dion reports no bleeding issues. Also he has no invasive or  surgical procedures in the past year. Dion is not anticipating any invasive or surgical procedures in the near future. However, he is due for his colonoscopy in 2023.     He saw his neurologist in Oct 2022 and was told that his brain stimulator battery is working well and unlikely needs to be exchanged out until probably the year 2024.     ROS:  Denies any bleeding complications. Specifically, no frequent epistaxis. No issues with oral mucosal bleeding. Denies any hematuria or blood in stools. Denies any shortness of breath. No chest pain. No cough. No fever.    Medications:   Current Outpatient Medications   Medication     carbidopa-levodopa (SINEMET)  MG tablet     carbidopa-levodopa ER (RYTARY) 36. MG CPCR per CR capsule     Cholecalciferol (VITAMIN D) 2000 units tablet     Multiple Vitamins-Minerals (CENTRUM SILVER) per tablet     rOPINIRole (REQUIP) 2 MG tablet     No current facility-administered medications for this visit.       Allergies:   Allergies   Allergen Reactions     Penicillins      Hives     Pramipexole      Leg swelling and compulsive behavior       PMH:   Past Medical History:   Diagnosis Date     Encounter for neuropsychological testing 9/16/2018    IMPRESSIONS AND RECOMMENDATIONS   Current results indicate impairments in learning, which are somewhat variable, and memory that falls well within normal limits. Language, visual processing, complex attentional processing, and executive functioning fall within normal limits, generally in the above average range. He denied experiencing significant depressive symptomatology, apathy, or anxiety.   Th     Esophageal reflux 7/29/2018     H/O magnetic resonance imaging of brain and brain stem 8/31/2018    MR BRAIN W/O & W CONTRAST 8/31/2018 2:30 PM  History: ; Parkinson's disease (H) ICD-10: Parkinson's disease (H)  Comparison:  3/14/2006    Technique: Midline sagittal T1-weighted images and axial diffusion with images through the whole brain  were obtained. Axial and coronal T2-weighted images, and inversion recovery images were obtained with focus on the evans through the corpus callosum, obtained      Hx of von Willebrand's disease 7/29/2018     Parkinson disease (H) 7/29/2018       Social History:   Deferred    Family History:  Deferred.     Objective:  Visual Examination via Video:  Pleasant in no acute distress.  Normal work of breathing   A+O x 3    Labs:  Results for DION AWAD (MRN [id number]) as of 7/16/2019 08:14 (perioperative VWF panel with VONVENDI as described above).      Ref. Range 7/9/2019 06:10 7/9/2019 17:01 7/10/2019 06:21 7/11/2019 03:38 7/12/2019 06:52 7/15/2019 07:20   Factor 8 Assay Latest Ref Range: 55 - 200 % 87 126 138 201 (H) 175 105   von Willebrand Antigen Latest Ref Range: 50 - 200 % 109 98 167 155 132 88   von Willebrand Factor Activity Latest Ref Range: 50 - 180 % 80 57 128 106 73 35 (L)              Assessment:  Dion is a 62 year old male with severe Type I Von Willebrand Disease (VWD) with his baseline factor VIII level of 30%; VWF:Ag of 15% and VWF:Acitivty of <20%,)  who also has a history of refractory Parkinson's Disease S/P deep brain stimulator, returns to clinic today for his routine annual follow up clinic visit.      In the past year, Dion has done well without any bleeding diathesis with his daily activities.      Diagnosis:  1. Severe Type I Von Willebrand Disease.   2. Parkinson's disease S/P deep brain stimulator placement in 2019.  3. 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     Plan:  No change in regard to management of his Von Willebrand Disease. He does not require routine treatment for his Von Willebrand Disease as he does not have any bleeding issues with daily activities. However, if he should need any invasive procedures, he will need hemostatic support in terms of Von Willebrand Factor concentrate replacement therapy with or without antifibrinolytics (I.e.  Tranexamic acid or aminocaproic acid).     I have instructed Dion to call our clinic once he has a scheduled date for his colonoscopy. As we have previously done, he will likely need a single dose of Humate-P at 60 RCo Units +/- 10% IV bolus prior to the colonoscopy procedure and then if there are any biopsies or polypectomies, he will need tranexamic acid or aminocaproic acid for 10 days following the procedure.     I will plan to see him back once yearly for follow up.     Video-Visit Details:  Type of service:  Video Visit  Video Start Time: 14:33  Video End Time (time video stopped): 14:42  Originating Location (pt. Location): Home  Distant Location (provider location):  [facility]   Mode of Communication:  Video Conference via [employer]      Vern Ding PA-C, MPAS  Physician Assistant  [facility].     26 minutes spent on the date of the encounter doing chart review, history and exam, documentation and further activities per the note.

## 2022-12-12 NOTE — PATIENT INSTRUCTIONS
Dion,    It was nice to see you via video visit today.    Below is a summary of our plan:  No need for routine treatment for your Von Willebrand Disease.  Once you have a date set for your colonoscopy, please call [phone] and ask to speak to a nursing staff and inform us about the date. You will need Von Willebrand Factor Concentrate replacement therapy prior to the colonoscopy and then if you have any biopsies or polypectomies done, then you will also need Amicar or Lysteda afterwards.   Also call us if you should have other invasive procedures or surgeries in the future or if you should experience any unusual bleeding complications.   One of our administrative assistants will contact you to schedule your return visit with me in one year.     Thank you once again in choosing our clinic as part of your healthcare team.      Vern Ding PA-C, MPAS  Physician Assistant  [facility].

## 2023-05-02 DIAGNOSIS — G20.A1 PARKINSON'S DISEASE (H): ICD-10-CM

## 2023-05-02 DIAGNOSIS — D68.01 VON WILLEBRAND DISEASE, TYPE I (H): ICD-10-CM

## 2023-05-02 RX ORDER — CELECOXIB 200 MG/1
200 CAPSULE ORAL DAILY
Qty: 30 CAPSULE | Refills: 5 | Status: SHIPPED | OUTPATIENT
Start: 2023-05-02 | End: 2024-06-28

## 2023-06-27 ENCOUNTER — TELEPHONE (OUTPATIENT)
Dept: HEMATOLOGY | Facility: CLINIC | Age: 63
End: 2023-06-27
Payer: MEDICARE

## 2023-06-27 NOTE — CONFIDENTIAL NOTE
[id number]  Dion Villavicencio  62 year old male  CBCD Diagnosis: Type I vWD    Factor VIII level of 30%;    VWF:Ag of 15%   VWF:Acitivty of <20% (done back in 2018)  CBCD Provider: Timur ANTOINE received a phone call from Dion regarding questions about upcoming colonoscopy. He states last time he had this procedure done at [facility]- he received factor prior and the day post. He had to go see Dr. Fierro to get orders placed. He is wondering what the process will before the procedure this time. RN asked him if he had this scheduled or was working with a GI provider and he stated not yet. RN has asked him to get it scheduled- and call us back with date/time and provider to see if we can reach out to them directly and save him a hematology visit. RN has asked that he give us ample heads up to help set up infusion.     RN will discuss with Vern Ding PA-C in case anything else is indicated. RN will update Dion if we need to get him a hematology appointment.    Becky Sanchez  MSN, RN, PHN  [facility]  Office: [phone]  Fax: [phone]

## 2023-07-17 ENCOUNTER — TELEPHONE (OUTPATIENT)
Dept: HEMATOLOGY | Facility: CLINIC | Age: 63
End: 2023-07-17
Payer: MEDICARE

## 2023-07-17 NOTE — CONFIDENTIAL NOTE
[id number]  Dion Villavicencio  62 year old male  CBCD Diagnosis: vWd  CBCD Provider: Vern Ding PA-C    RN received notification from Dion that he is scheduled for a colonoscopy on January 23 2024 at [facility].     The provider for the procedure:  Belinda Tripathi MD    [address]    [place] [address]    [phone] (Work)    [phone] (Fax)         Per Vern Ding PA-C note:    As we have previously done, he will likely need a single dose of Humate-P at 60 RCo Units +/- 10% IV bolus prior to the colonoscopy procedure and then if there are any biopsies or polypectomies, he will need tranexamic acid or aminocaproic acid for 10 days following the procedure.     RN will route to care team to confirm this is the plan and we will fax to GI office and confirm they received our plan so that coordination of factor can be achieved.     Becky Sanchez  MSN, RN, PHN  [facility]  Office: [phone]  Fax: [phone]

## 2023-07-18 NOTE — PROGRESS NOTES
[facility]  Center for Bleeding and Clotting Disorders  [facility] [address], [address], [place] [address]  Main: [phone], Fax: [phone]    Hematological treatment orders for his upcoming colonoscopy scheduled on 2024.    Patient: Dion Villavicencio  MRN: [id number]  : 1960  Date of this note written: 2023    Brief History:  Dion Villavicencio is a 62 year old male with Parkinson's disease who also has severe Type I Von Willebrand Disease with his baseline factor VIII level of 30%; VWF:Ag of 15% and VWF:Activity of <20% currently scheduled to undergo colonoscopy at [facility] on 2024.    Hematological treatment orders for his upcoming colonoscopy scheduled on 2024:    Infuse Humate-P at 60 RCo Units/kg IV bolus at 4 mL/min just 30-90 minutes prior to his scheduled colonoscopy procedure.       Vern Ding PA-C, MPAS  Physician Assistant  [facility].

## 2023-07-18 NOTE — TELEPHONE ENCOUNTER
"[id number]  Dion Villavicencio  62 year old male   CBCD Diagnosis: vWd  CBCD Provider: Vern Ding PA-C    Dion is scheduled for a colonoscopy on 1/23/24. Plan per Vern is below     \"Hematological treatment orders for his upcoming colonoscopy scheduled on 1/23/2024:    Infuse Humate-P at 60 RCo Units/kg IV bolus at 4 mL/min just 30-90 minutes prior to his scheduled colonoscopy procedure.\"    Vern also states that Dion will \"need a prescription for Lysteda or amicar when it is closer to his procedure if polypectomy or biopsy is done.\"      RN called RN team at [facility] @ [phone] and verified their fax number, instructed them to call [place] for [facility] clinic if they have any questions on plan and/or need assistance obtaining Humate P. Additionally, RN faxed over order to [facility] GI team for Dr. Cisneros @ [phone]. RN also called patient and informed him about the plan. He is going to schedule his yearly follow-up with Vern in November, declined to make appointment today. RN plans to call him in December to set up Amicar or Lysteda prescription.    Bria Oseguera RN, BSN, PCCN  Nurse Clinician    [facility]ot[facility]  [address] [place] [address], [address], [place] [address]   Office, direct: [phone]  Main office number: [phone]  Pronouns: She, her, hers            "

## 2023-11-29 NOTE — PROGRESS NOTES
[facility]  Center for Bleeding and Clotting Disorders  [facility] [address], [address], [place] [address]  Main: [phone], Fax: [phone]    Video Virtual Visit Note:    Patient: Dion Villavicencio  MRN: [id number]  : 1960  MARY: Dec 7, 2023    Due to the ongoing COVID-19 outbreak, this visit was conducted by video, with the patient's approval.    Reason of today's visit:  Severe Type I Von Willebrand Disease. Annual follow up.     Clinical History Summary:  Dion Villavicencio is a 63 year old male with Parkinson's disease who also has Type I Von Willebrand Disease with his baseline factor VIII level of 30%; VWF:Ag of 15% and VWF:Acitivty of <20% (done back in 2018), returns to clinic today for his routine annual follow up visit. Both Dion's father and his son also has Type I Von Willebrand Disease.      Dion was last seen by Dr. Anish Ma back in 7/15/2019 and by this writer back in 2022.     Bleeding History Summary:  Dion's father apparently had a history of severe and frequent epistaxis that often required cautery. After his father was diagnosed with Von Willebrand Disease, he then would be treated with a typical agent such as aminocaproic acid and Stimate. Dion's son was also eventually found to have severe Von Willebrand Disease after an injury as a young child. His son apparently fell and injured his lip which then bled severely and required attention in the emergency department. Dion's son was followed by Dr. Twila Alexander, pediatric hematologist at [facility] when he was a child but has lost follow up since he turns 21. Currently he is in his 30's. Dion's son leaves in the [place] area. Dion reports that his son did use Stimate in the past but he does not belief that a formal DDAVP trial on his son had ever been done.   Dion then eventually is also diagnosed with severe Type I Von Willebrand Disease I belief was around 2018.  Prior to his diagnosis of Von  Willebrand disease, he did undergo wisdom teeth extraction without any excessive bleeding. He did have a laceration to the left side of his face and orbit in the past that required suturing and did have a large hematoma. He also had injured his left knee in the past and also recalled that he had a large hematoma. He did need cautery twice as a child for epistaxis. He never had any major joint or abdominal surgeries.   He first established care with our clinic back in 9/6/2018 and was seen by Dr. Ma after he was referred by neurosurgery for consultation in regard to bleeding disorder managemetn for deep brain stimulator placement for his Parkinson's disease.   Dion underwent deep brain stimulator placement back in 7/9/2019 for his Parkinson's disease. For that particular surgery, Dion received a dose of VONVENDI (rVWF) at 60 Units/kg the day prior to his surgery. He had as expected recovery on the day of his surgery. He also received a repeat dose of VONVENDI at 40 Units/kg a few hours after his surgery and also a repeat dose on 7/12/2019 just prior to his discharge. Dion did well without any bleeding issues. Dion also had received Humate-P in the past for teeth extraction procedures.   He then saw this writer back in Nov 2020 for bleeding disorder management of a screening colonoscopy. I recommended one dose of Humate-P at 60 RCo Units/kg 30-60 minutes prior to the procedure at the time. I also recommended Amicar x 10 days if any biopsy or polypectomy was done. He did have multiple polypectomies done during this procedure. It is unclear to me if the patient picked up his Amicar after the colonoscopy. The patient and his wife also does not remember. But regardless, he recalls no complications with bleeding. His next colonoscopy is in the year of 2023.    Dion had never undergone formal DDAVP trial.      Interim History:  Dion is scheduled to undergo screening colonoscopy on 1/23/2024 at [facility]. I have  already provided him with a hematological treatment plan back in July 2023. Please see my note on 7/17/2023 for details. I have the final plan mentioned below.     Otherwise, Dion reports no bleeding issues in the past year. He eventually will need the deep brain stimulator battery replaced but it seems like it is still working well currently and likely not in another year.     He attended this visit with his significant others today.    ROS:  Denies any bleeding complications. Specifically, no frequent epistaxis. No issues with oral mucosal bleeding. Denies any hematuria or blood in stools. Denies any shortness of breath. No chest pain. No cough. No fever.    Medications:   Current Outpatient Medications   Medication    carbidopa-levodopa (SINEMET)  MG tablet    carbidopa-levodopa ER (RYTARY) 36. MG CPCR per CR capsule    celecoxib (CELEBREX) 200 MG capsule    Cholecalciferol (VITAMIN D) 2000 units tablet    Multiple Vitamins-Minerals (CENTRUM SILVER) per tablet    rOPINIRole (REQUIP) 2 MG tablet     No current facility-administered medications for this visit.     Allergies:   Allergies   Allergen Reactions    Penicillins      Hives    Pramipexole      Leg swelling and compulsive behavior      PMH:   Past Medical History:   Diagnosis Date    Encounter for neuropsychological testing 9/16/2018    IMPRESSIONS AND RECOMMENDATIONS   Current results indicate impairments in learning, which are somewhat variable, and memory that falls well within normal limits. Language, visual processing, complex attentional processing, and executive functioning fall within normal limits, generally in the above average range. He denied experiencing significant depressive symptomatology, apathy, or anxiety.   Th    Esophageal reflux 7/29/2018    H/O magnetic resonance imaging of brain and brain stem 8/31/2018    MR BRAIN W/O & W CONTRAST 8/31/2018 2:30 PM  History: ; Parkinson's disease (H) ICD-10: Parkinson's disease (H)   Comparison:  3/14/2006    Technique: Midline sagittal T1-weighted images and axial diffusion with images through the whole brain were obtained. Axial and coronal T2-weighted images, and inversion recovery images were obtained with focus on the evans through the corpus callosum, obtained     Hx of von Willebrand's disease 7/29/2018    Parkinson disease (H) 7/29/2018       Social History:   He reports that he is now on Medicare.     Family History:  Deferred    Objective:  Visual Examination via Video:  Pleasant in no acute distress.  Normal work of breathing   A+O x 3    Labs:  Component      Latest Ref Rng 11/12/2021  2:02 PM   Factor 8 Assay      55 - 200 % 36 (L)    von Willebrand Factor Antigen      50 - 200 % 19 (L)    von Willebrand Factor Activity      50 - 180 % <19 (LL)    Ristocetin Cofactor Activity      51 - 215 % < 10 (L)       Results for DION AWAD (MRN [id number]) as of 7/16/2019 08:14 (perioperative VWF panel with VONVENDI as described above).       Ref. Range 7/9/2019 06:10 7/9/2019 17:01 7/10/2019 06:21 7/11/2019 03:38 7/12/2019 06:52 7/15/2019 07:20   Factor 8 Assay Latest Ref Range: 55 - 200 % 87 126 138 201 (H) 175 105   von Willebrand Antigen Latest Ref Range: 50 - 200 % 109 98 167 155 132 88   von Willebrand Factor Activity Latest Ref Range: 50 - 180 % 80 57 128 106 73 35 (L)       Assessment:  Dion is a 63 year old male with severe Type I Von Willebrand Disease (VWD) with his baseline factor VIII level of 30%; VWF:Ag of 15% and VWF:Acitivty of <20%,)  who also has a history of refractory Parkinson's Disease S/P deep brain stimulator, 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      In the past year, Dion has done well without any bleeding diathesis with his daily activities.      Diagnosis:  Severe Type I Von Willebrand Disease.   Parkinson's disease S/P deep brain stimulator placement  in 2019. Will eventually needs the battery replace likely in one year from now (Dec 2023).   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     Plan:  As expected, Dion has had no significant bleeding issues with day to day living activities. He has not require any Von Willebrand Factor Concentrate replacement therapy for the past year.     For his upcoming colonoscopy scheduled on 1/23/2024, his final Von Willebrand Disease management plan is as follow:  Infuse Humate-P at 60 RCo Units/kg IV bolus at 4 mL/min just 30-90 minutes prior to his scheduled colonoscopy procedure.   If he should need polypectomy or biopsy, then he should get a follow up dose of Humate-P at 60 RCo Units/kg the day after his colonoscopy AND take tranexamic acid (Lysteda) at 1300 mg PO BID or aminocaproic acid (Amicar) at 3 grams PO TID for 14 days post procedure.   Since this procedure is being done at [facility], similar to his previous colonoscopy procedure back in Dec 2020, we will need Dr. Fierro, hematologist at [facility] to assist in arranging for his Humate-P infusion. I will have one of our nursing staff to coordinate and communicate to the patient about the ultimate plan.     Dion and his significant other have some very good questions today and I have answered them to their satisfaction.     He will continue to use Celebrex for episodic pain as needed. Celebrex is prescribed by this writer.     He is instructed to call our clinic if he should experience any unusual bleeding issues or if he should need any invasive or surgical procedures in the future. Otherwise, I will plan to see him back in one year. In person visit is preferred.     Video-Visit Details:  Type of service:  Video Visit  Video Start Time: 11:30  Video End Time (time video stopped): 11:48  Originating Location (pt. Location): Home  Distant Location (provider location):  [facility]    Mode of Communication:  Video Conference via PowerVision      Vern Ding PA-C, MPAS  Physician Assistant  [facility] for [facility].     30 minutes spent by me on the date of the encounter doing chart review, history and exam, documentation and further activities per the note

## 2023-12-07 ENCOUNTER — VIRTUAL VISIT (OUTPATIENT)
Dept: HEMATOLOGY | Facility: CLINIC | Age: 63
End: 2023-12-07
Attending: PHYSICIAN ASSISTANT
Payer: MEDICARE

## 2023-12-07 DIAGNOSIS — D68.01 VON WILLEBRAND DISEASE, TYPE I (H): Primary | ICD-10-CM

## 2023-12-07 DIAGNOSIS — G20.A1 PARKINSON'S DISEASE, UNSPECIFIED WHETHER DYSKINESIA PRESENT, UNSPECIFIED WHETHER MANIFESTATIONS FLUCTUATE (H): ICD-10-CM

## 2023-12-07 PROCEDURE — 99214 OFFICE O/P EST MOD 30 MIN: CPT | Mod: 95 | Performed by: PHYSICIAN ASSISTANT

## 2023-12-07 NOTE — PROGRESS NOTES
Patient was contacted to complete the pre-visit call prior to their video visit with the provider.      Allergies and medications were reviewed.    I thanked them for their time to cover this information     Oscar Dow CMA

## 2023-12-07 NOTE — LETTER
[facility]  [facility]  [facility], [address], [place] [address]  Main: [phone], Fax: [phone]    Video Virtual Visit Note:    Patient: Dion Villavicencio  MRN: [id number]  : 1960  MARY: Dec 7, 2023    Due to the ongoing COVID-19 outbreak, this visit was conducted by video, with the patient's approval.    Reason of today's visit:  Severe Type I Von Willebrand Disease. Annual follow up.     Clinical History Summary:  Dion Villavicencio is a 63 year old male with Parkinson's disease who also has Type I Von Willebrand Disease with his baseline factor VIII level of 30%; VWF:Ag of 15% and VWF:Acitivty of <20% (done back in 2018), returns to clinic today for his routine annual follow up visit. Both Dion's father and his son also has Type I Von Willebrand Disease.      Dion was last seen by Dr. Ansih Ma back in 7/15/2019 and by this writer back in 2022.     Bleeding History Summary:  Dion's father apparently had a history of severe and frequent epistaxis that often required cautery. After his father was diagnosed with Von Willebrand Disease, he then would be treated with a typical agent such as aminocaproic acid and Stimate. Dion's son was also eventually found to have severe Von Willebrand Disease after an injury as a young child. His son apparently fell and injured his lip which then bled severely and required attention in the emergency department. Dion's son was followed by Dr. Twila Alexander, pediatric hematologist at [facility] when he was a child but has lost follow up since he turns 21. Currently he is in his 30's. Dion's son leaves in the [place] area. Dion reports that his son did use Stimate in the past but he does not belief that a formal DDAVP trial on his son had ever been done.   Dion then eventually is also diagnosed with severe Type I Von Willebrand Disease I belief was around 2018.  Prior to his diagnosis  of Von Willebrand disease, he did undergo wisdom teeth extraction without any excessive bleeding. He did have a laceration to the left side of his face and orbit in the past that required suturing and did have a large hematoma. He also had injured his left knee in the past and also recalled that he had a large hematoma. He did need cautery twice as a child for epistaxis. He never had any major joint or abdominal surgeries.   He first established care with our clinic back in 9/6/2018 and was seen by Dr. Ma after he was referred by neurosurgery for consultation in regard to bleeding disorder managemetn for deep brain stimulator placement for his Parkinson's disease.   Dion underwent deep brain stimulator placement back in 7/9/2019 for his Parkinson's disease. For that particular surgery, Dion received a dose of VONVENDI (rVWF) at 60 Units/kg the day prior to his surgery. He had as expected recovery on the day of his surgery. He also received a repeat dose of VONVENDI at 40 Units/kg a few hours after his surgery and also a repeat dose on 7/12/2019 just prior to his discharge. Dion did well without any bleeding issues. Dion also had received Humate-P in the past for teeth extraction procedures.   He then saw this writer back in Nov 2020 for bleeding disorder management of a screening colonoscopy. I recommended one dose of Humate-P at 60 RCo Units/kg 30-60 minutes prior to the procedure at the time. I also recommended Amicar x 10 days if any biopsy or polypectomy was done. He did have multiple polypectomies done during this procedure. It is unclear to me if the patient picked up his Amicar after the colonoscopy. The patient and his wife also does not remember. But regardless, he recalls no complications with bleeding. His next colonoscopy is in the year of 2023.    Dion had never undergone formal DDAVP trial.      Interim History:  Dion is scheduled to undergo screening colonoscopy on 1/23/2024 at [facility]. I  have already provided him with a hematological treatment plan back in July 2023. Please see my note on 7/17/2023 for details. I have the final plan mentioned below.     Otherwise, Dion reports no bleeding issues in the past year. He eventually will need the deep brain stimulator battery replaced but it seems like it is still working well currently and likely not in another year.     He attended this visit with his significant others today.    ROS:  Denies any bleeding complications. Specifically, no frequent epistaxis. No issues with oral mucosal bleeding. Denies any hematuria or blood in stools. Denies any shortness of breath. No chest pain. No cough. No fever.    Medications:   Current Outpatient Medications   Medication     carbidopa-levodopa (SINEMET)  MG tablet     carbidopa-levodopa ER (RYTARY) 36. MG CPCR per CR capsule     celecoxib (CELEBREX) 200 MG capsule     Cholecalciferol (VITAMIN D) 2000 units tablet     Multiple Vitamins-Minerals (CENTRUM SILVER) per tablet     rOPINIRole (REQUIP) 2 MG tablet     No current facility-administered medications for this visit.     Allergies:   Allergies   Allergen Reactions     Penicillins      Hives     Pramipexole      Leg swelling and compulsive behavior      PMH:   Past Medical History:   Diagnosis Date     Encounter for neuropsychological testing 9/16/2018    IMPRESSIONS AND RECOMMENDATIONS   Current results indicate impairments in learning, which are somewhat variable, and memory that falls well within normal limits. Language, visual processing, complex attentional processing, and executive functioning fall within normal limits, generally in the above average range. He denied experiencing significant depressive symptomatology, apathy, or anxiety.   Th     Esophageal reflux 7/29/2018     H/O magnetic resonance imaging of brain and brain stem 8/31/2018    MR BRAIN W/O & W CONTRAST 8/31/2018 2:30 PM  History: ; Parkinson's disease (H) ICD-10: Parkinson's  disease (H)  Comparison:  3/14/2006    Technique: Midline sagittal T1-weighted images and axial diffusion with images through the whole brain were obtained. Axial and coronal T2-weighted images, and inversion recovery images were obtained with focus on the evans through the corpus callosum, obtained      Hx of von Willebrand's disease 7/29/2018     Parkinson disease (H) 7/29/2018       Social History:   He reports that he is now on Medicare.     Family History:  Deferred    Objective:  Visual Examination via Video:  Pleasant in no acute distress.  Normal work of breathing   A+O x 3    Labs:  Component      Latest Ref Rng 11/12/2021  2:02 PM   Factor 8 Assay      55 - 200 % 36 (L)    von Willebrand Factor Antigen      50 - 200 % 19 (L)    von Willebrand Factor Activity      50 - 180 % <19 (LL)    Ristocetin Cofactor Activity      51 - 215 % < 10 (L)       Results for DION AWAD (MRN [id number]) as of 7/16/2019 08:14 (perioperative VWF panel with VONVENDI as described above).       Ref. Range 7/9/2019 06:10 7/9/2019 17:01 7/10/2019 06:21 7/11/2019 03:38 7/12/2019 06:52 7/15/2019 07:20   Factor 8 Assay Latest Ref Range: 55 - 200 % 87 126 138 201 (H) 175 105   von Willebrand Antigen Latest Ref Range: 50 - 200 % 109 98 167 155 132 88   von Willebrand Factor Activity Latest Ref Range: 50 - 180 % 80 57 128 106 73 35 (L)       Assessment:  Dion is a 63 year old male with severe Type I Von Willebrand Disease (VWD) with his baseline factor VIII level of 30%; VWF:Ag of 15% and VWF:Acitivty of <20%,)  who also has a history of refractory Parkinson's Disease S/P deep brain stimulator, 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      In the past year, Dion has done well without any bleeding diathesis with his daily activities.      Diagnosis:  Severe Type I Von Willebrand Disease.   Parkinson's disease S/P deep brain  stimulator placement in 2019. Will eventually needs the battery replace likely in one year from now (Dec 2023).   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     Plan:  As expected, Dion has had no significant bleeding issues with day to day living activities. He has not require any Von Willebrand Factor Concentrate replacement therapy for the past year.     For his upcoming colonoscopy scheduled on 1/23/2024, his final Von Willebrand Disease management plan is as follow:  Infuse Humate-P at 60 RCo Units/kg IV bolus at 4 mL/min just 30-90 minutes prior to his scheduled colonoscopy procedure.   If he should need polypectomy or biopsy, then he should get a follow up dose of Humate-P at 60 RCo Units/kg the day after his colonoscopy AND take tranexamic acid (Lysteda) at 1300 mg PO BID or aminocaproic acid (Amicar) at 3 grams PO TID for 14 days post procedure.   Since this procedure is being done at [facility], similar to his previous colonoscopy procedure back in Dec 2020, we will need Dr. Fierro, hematologist at [facility] to assist in arranging for his Humate-P infusion. I will have one of our nursing staff to coordinate and communicate to the patient about the ultimate plan.     Dion and his significant other have some very good questions today and I have answered them to their satisfaction.     He will continue to use Celebrex for episodic pain as needed. Celebrex is prescribed by this writer.     He is instructed to call our clinic if he should experience any unusual bleeding issues or if he should need any invasive or surgical procedures in the future. Otherwise, I will plan to see him back in one year. In person visit is preferred.     Video-Visit Details:  Type of service:  Video Visit  Video Start Time: 11:30  Video End Time (time video stopped): 11:48  Originating Location (pt. Location): Home  Distant Location (provider location):  [facility]   Mode of Communication:  Video Conference via [employer]      Vern Ding PA-C, MPAS  Physician Assistant  [facility].     30 minutes spent by me on the date of the encounter doing chart review, history and exam, documentation and further activities per the note      Patient was contacted to complete the pre-visit call prior to their video visit with the provider.      Allergies and medications were reviewed.    I thanked them for their time to cover this information     Oscar Dow, CMA

## 2023-12-07 NOTE — PATIENT INSTRUCTIONS
Dion,    It was nice to see and talk to you and your significant other on video and on the phone today.    Below is a summary of our plan:  No need for routine treatment of your Von Willebrand Disease with your day to day living activities.   For your upcoming colonoscopy in 1/23/2024, your hematological treatment plan is as follow: 1) Infuse Humate-P at 60 RCo Units/kg IV just 30-90 minutes prior to your scheduled procedure. 2) If there is any polypectomy or biopsy, then you should receive a follow up dose of Humate-P at 60 RCo Units/kg IV the day after your colonoscopy (on 1/24/2024) AND you should take Lysteda or Amicar for 14 days after the procedure. 3) If no polypectomy or biopsy were done, then you do not need a follow up dose of Humate-P and you do not need to take your Lysteda or Amicar.   I will have one of our nurses to help with coordinating the above with Dr. Fierro's office at [facility]. One of our nurses will reach out to you in the next few days to one week about the final arrangement.   If you should have any further questions, or experience any unusual bleeding issues or if you should need any invasive or surgical procedures in the future, please call us at [phone] and ask to speak to a nursing staff.  One of our administrative assistants will contact you to schedule your return visit with me in one year. In person visit is preferred.     Thank you once again in choosing our clinic as part of your healthcare team.      Vern Ding PA-C, MPAS  Physician Assistant  [facility].

## 2023-12-12 ENCOUNTER — TELEPHONE (OUTPATIENT)
Dept: HEMATOLOGY | Facility: CLINIC | Age: 63
End: 2023-12-12
Payer: MEDICARE

## 2023-12-12 NOTE — TELEPHONE ENCOUNTER
This staff left a message on the nurseline for Dr. Fierro's nurse Concepcion ([phone])with an FYI that we are faxing over Vern's clinic note with his recommendations for Dion's upcoming colonoscopy. Call back number provided.    Roberta SANDERS, RN, N   [facility]   Office: [phone]  Fax: [phone]      Addendum:    2nd attempt to reach Dr. Fierro's nurse 12/15/23 @0955. Left message requesting a call back to confirm that fax from 12/12/23 was received.    Roberta SANDERS RN, [facility]  [facility]   Office: [phone]  Fax: [phone]       Addendum @1020:    Call back from Concepcion at Dr. Fierro's office. They received the fax with the orders and have placed the orders on their end. Once confirmed by insurance, they will contact the patient to schedule his infusion for the day of his colonoscopy and the day after. The patient will be instructed to cancel the infusion appointment for the day after if he does not have any polypectomies or biopsies. At this time Concepcion does not need any additional assistance from the [facility], she will call with any questions.    Call placed to Dion to let him know that he should be expecting to hear from Dr. Fierro's office re:infusion scheduling. He will let the [facility] know if he does not.    Roberta SANDERS RN, N   [facility]  [facility]   Office: [phone]  Fax: [phone]     Addendum 1/15/24    Call received from Dion letting the team know that his colonoscopy is being rescheduled to 2/19/24. Dion will reach out to Dr. Fierro's office to r/s his infusion that they are coordinating. Dion will call back if he has any issues or questions    Roberta SANDERS RN, N   [facility]  [facility]   Office: [phone]  Fax: [phone]

## 2024-06-28 DIAGNOSIS — D68.01 VON WILLEBRAND DISEASE, TYPE I (H): ICD-10-CM

## 2024-06-28 DIAGNOSIS — G20.A1 PARKINSON'S DISEASE (H): ICD-10-CM

## 2024-06-28 RX ORDER — CELECOXIB 200 MG/1
200 CAPSULE ORAL DAILY
Qty: 30 CAPSULE | Refills: 5 | Status: SHIPPED | OUTPATIENT
Start: 2024-06-28

## 2024-06-28 NOTE — TELEPHONE ENCOUNTER
Dion Villavicencio is followed at the [facility] for their history of VW disease.     They were last evaluated by our team on 12/07/2023 with the plan to continue to use Celebrex for episodic pain as needed. He is to follow up with Vern in December of this year. 6 month refill given.    Greg WILKINS RN  [facility]  [facility]  Office: [phone]  Clinic: [phone]  Fax: [phone]

## 2024-10-02 ENCOUNTER — MEDICAL CORRESPONDENCE (OUTPATIENT)
Dept: HEALTH INFORMATION MANAGEMENT | Facility: CLINIC | Age: 64
End: 2024-10-02
Payer: MEDICARE

## 2024-10-04 ENCOUNTER — TRANSCRIBE ORDERS (OUTPATIENT)
Dept: OTHER | Age: 64
End: 2024-10-04

## 2024-10-04 DIAGNOSIS — Z96.89 STATUS POST DEEP BRAIN STIMULATOR PLACEMENT: ICD-10-CM

## 2024-10-04 DIAGNOSIS — Z45.42 END OF BATTERY LIFE OF DEEP BRAIN STIMULATOR: Primary | ICD-10-CM

## 2024-10-04 DIAGNOSIS — G20.A1 PARKINSON'S DISEASE, UNSPECIFIED WHETHER DYSKINESIA PRESENT, UNSPECIFIED WHETHER MANIFESTATIONS FLUCTUATE (H): ICD-10-CM

## 2024-10-07 ENCOUNTER — TELEPHONE (OUTPATIENT)
Dept: NEUROLOGY | Facility: CLINIC | Age: 64
End: 2024-10-07
Payer: MEDICARE

## 2024-10-07 NOTE — TELEPHONE ENCOUNTER
The patient and his wife, Carmen, called back and the patient was scheduled with Dr. Cardozo on 10/14 at 7:45 AM. The patient is aware this is just for the consultation and not the surgery.

## 2024-10-07 NOTE — TELEPHONE ENCOUNTER
Writer left a message for the patient to call back in regards to the neurosurgery referral from Dr. England.

## 2024-10-07 NOTE — TELEPHONE ENCOUNTER
RECORDS RECEIVED FROM: [facility] Everywhere   REASON FOR VISIT: DBS battery replacement consult.   PROVIDER: Sidney Cardozo MD   DATE OF APPT: 10/14/24 @ 7:45 am    NOTES (FOR ALL VISITS) STATUS DETAILS   OFFICE NOTE from referring provider Care Everywhere 10/4/24 (Transcribed Orders), 5/16/24, 11/15/23 Dr. Hellen England @[facility]    See Additional Encounters   OFFICE NOTE from other specialist Care Everywhere 5/16/24, 5/15/23, 11/14/22 Rosa Isela Schafer RN  @[facility]     DISCHARGE SUMMARY from hospital Internal 7/8/19-7/12/19 Sidney Cardozo MD @[facility]      OPERATIVE REPORT Internal 7/11/19 Sidney Cardozo MD @[facility] Deep Brain Stimulator Placement, Phase II, Placement Of Deep Brain Stimulator Generator/Battery Over The Left Chest Wall     7[facility]9/19 [facility] Stimulator [facility], Phase I, Placement Of Bilateral Deep Brain Stimulator Electrode, Target Bilateral Subthalamic Nucleus With Microelectrode Recording      MEDICATION LIST Internal    IMAGING  (FOR ALL VISITS)     X-RAY Internal [facility]  7/9/19 XR Skull     MRI (HEAD, NECK, SPINE) Internal [facility]  8/31/18 MR Brain     CT (HEAD, NECK, SPINE) Internal [facility]  8/16/19 CT Head  7/9/19 CT Head (6:27 pm)  7/9/19 CT Head (9:00 am)

## 2024-10-14 ENCOUNTER — PRE VISIT (OUTPATIENT)
Dept: NEUROSURGERY | Facility: CLINIC | Age: 64
End: 2024-10-14

## 2024-10-14 ENCOUNTER — OFFICE VISIT (OUTPATIENT)
Dept: NEUROSURGERY | Facility: CLINIC | Age: 64
End: 2024-10-14
Payer: MEDICARE

## 2024-10-14 ENCOUNTER — LAB (OUTPATIENT)
Dept: LAB | Facility: CLINIC | Age: 64
End: 2024-10-14
Payer: MEDICARE

## 2024-10-14 ENCOUNTER — DOCUMENTATION ONLY (OUTPATIENT)
Dept: NEUROSURGERY | Facility: CLINIC | Age: 64
End: 2024-10-14

## 2024-10-14 VITALS
RESPIRATION RATE: 16 BRPM | BODY MASS INDEX: 27.9 KG/M2 | OXYGEN SATURATION: 94 % | HEIGHT: 68 IN | HEART RATE: 94 BPM | DIASTOLIC BLOOD PRESSURE: 71 MMHG | WEIGHT: 184.1 LBS | SYSTOLIC BLOOD PRESSURE: 112 MMHG

## 2024-10-14 DIAGNOSIS — Z96.89 STATUS POST DEEP BRAIN STIMULATOR PLACEMENT: ICD-10-CM

## 2024-10-14 DIAGNOSIS — R79.1 ABNORMAL COAGULATION PROFILE: ICD-10-CM

## 2024-10-14 DIAGNOSIS — G20.A1 PARKINSON'S DISEASE, UNSPECIFIED WHETHER DYSKINESIA PRESENT, UNSPECIFIED WHETHER MANIFESTATIONS FLUCTUATE (H): ICD-10-CM

## 2024-10-14 DIAGNOSIS — D68.01 VON WILLEBRAND DISEASE, TYPE I (H): ICD-10-CM

## 2024-10-14 DIAGNOSIS — D68.01 VON WILLEBRAND DISEASE, TYPE I (H): Primary | ICD-10-CM

## 2024-10-14 DIAGNOSIS — Z45.42 END OF BATTERY LIFE OF DEEP BRAIN STIMULATOR: ICD-10-CM

## 2024-10-14 DIAGNOSIS — Z01.818 PREOPERATIVE EVALUATION TO RULE OUT SURGICAL CONTRAINDICATION: Primary | ICD-10-CM

## 2024-10-14 DIAGNOSIS — G20.A1 PARKINSON'S DISEASE, UNSPECIFIED WHETHER DYSKINESIA PRESENT, UNSPECIFIED WHETHER MANIFESTATIONS FLUCTUATE (H): Primary | ICD-10-CM

## 2024-10-14 DIAGNOSIS — Z01.818 PREOPERATIVE EVALUATION TO RULE OUT SURGICAL CONTRAINDICATION: ICD-10-CM

## 2024-10-14 LAB
ALBUMIN UR-MCNC: NEGATIVE MG/DL
ANION GAP SERPL CALCULATED.3IONS-SCNC: 8 MMOL/L (ref 7–15)
APPEARANCE UR: CLEAR
APTT PPP: 33 SECONDS (ref 22–38)
BILIRUB UR QL STRIP: NEGATIVE
BUN SERPL-MCNC: 19.7 MG/DL (ref 8–23)
CALCIUM SERPL-MCNC: 9.1 MG/DL (ref 8.8–10.4)
CHLORIDE SERPL-SCNC: 109 MMOL/L (ref 98–107)
COLOR UR AUTO: YELLOW
CREAT SERPL-MCNC: 0.97 MG/DL (ref 0.67–1.17)
EGFRCR SERPLBLD CKD-EPI 2021: 88 ML/MIN/1.73M2
ERYTHROCYTE [DISTWIDTH] IN BLOOD BY AUTOMATED COUNT: 12.5 % (ref 10–15)
GLUCOSE SERPL-MCNC: 117 MG/DL (ref 70–99)
GLUCOSE UR STRIP-MCNC: NEGATIVE MG/DL
HCO3 SERPL-SCNC: 25 MMOL/L (ref 22–29)
HCT VFR BLD AUTO: 49 % (ref 40–53)
HGB BLD-MCNC: 16.3 G/DL (ref 13.3–17.7)
HGB UR QL STRIP: NEGATIVE
INR PPP: 0.94 (ref 0.85–1.15)
KETONES UR STRIP-MCNC: 10 MG/DL
LEUKOCYTE ESTERASE UR QL STRIP: NEGATIVE
MCH RBC QN AUTO: 30.7 PG (ref 26.5–33)
MCHC RBC AUTO-ENTMCNC: 33.3 G/DL (ref 31.5–36.5)
MCV RBC AUTO: 92 FL (ref 78–100)
MUCOUS THREADS #/AREA URNS LPF: PRESENT /LPF
NITRATE UR QL: NEGATIVE
PH UR STRIP: 5 [PH] (ref 5–7)
PLATELET # BLD AUTO: 230 10E3/UL (ref 150–450)
POTASSIUM SERPL-SCNC: 4.5 MMOL/L (ref 3.4–5.3)
RBC # BLD AUTO: 5.31 10E6/UL (ref 4.4–5.9)
RBC URINE: 1 /HPF
SODIUM SERPL-SCNC: 142 MMOL/L (ref 135–145)
SP GR UR STRIP: 1.03 (ref 1–1.03)
UROBILINOGEN UR STRIP-MCNC: NORMAL MG/DL
WBC # BLD AUTO: 5.3 10E3/UL (ref 4–11)
WBC URINE: 3 /HPF

## 2024-10-14 PROCEDURE — 81001 URINALYSIS AUTO W/SCOPE: CPT | Performed by: PATHOLOGY

## 2024-10-14 PROCEDURE — 99205 OFFICE O/P NEW HI 60 MIN: CPT | Performed by: NEUROLOGICAL SURGERY

## 2024-10-14 PROCEDURE — 85610 PROTHROMBIN TIME: CPT | Performed by: PATHOLOGY

## 2024-10-14 PROCEDURE — 36415 COLL VENOUS BLD VENIPUNCTURE: CPT | Performed by: PATHOLOGY

## 2024-10-14 PROCEDURE — 85730 THROMBOPLASTIN TIME PARTIAL: CPT | Performed by: PATHOLOGY

## 2024-10-14 PROCEDURE — 80048 BASIC METABOLIC PNL TOTAL CA: CPT | Performed by: PATHOLOGY

## 2024-10-14 PROCEDURE — 85027 COMPLETE CBC AUTOMATED: CPT | Performed by: PATHOLOGY

## 2024-10-14 RX ORDER — TRANEXAMIC ACID 650 MG/1
1300 TABLET ORAL SEE ADMIN INSTRUCTIONS
Qty: 28 TABLET | Refills: 0 | Status: SHIPPED | OUTPATIENT
Start: 2024-10-14

## 2024-10-14 NOTE — CONFIDENTIAL NOTE
Scheduled patient  surgery in clinic with Dr. Sidney Blunt    Date of Surgery: 10/31/2024    Estimated Arrival time Discussed with Patient:  Yes    Location of surgery: [facility]     Pre-Op H&P: Completed by provider    Post-Op Appts: 11/15/42496 with Lorie Tripp     Imaging:  No -    Discussed with patient pre-op RN will call 2-3 days prior to surgery with arrival time and instructions:  Yes     Packet sent out: Yes 10/14/24  via [employer] Message    Additional Comments:    - Per provider patient should arrive at 10:00 AM     All patients questions were answered and patient was instructed to review surgical packet and call back with any questions or concerns.       Laura Son on 10/14/2024 at 3:23 PM

## 2024-10-14 NOTE — PROGRESS NOTES
NEUROSURGERY    HISTORY AND PHYSICAL EXAM    Chief Complaint   Patient presents with    New Patient     Parkinson's disease.  S/p bilateral DBS placement.  Depleted DBS generator/battery over the left chest wall.  Von Willebrand's disease.       HISTORY OF PRESENT ILLNESS  Mr. Dion Villavicencio is a 64 year old right handed male who is familiar to me.  He has a diagnosis of Parkinson's disease and he is status post bilateral deep brain stimulator placement, phase I, placement of bilateral deep brain stimulator electrodes, target bilateral subthalamic nucleus, with microelectrode recording on 7/9/2019 and status post deep brain stimulator placement, phase II, placement of deep brain stimulator generator/battery over the left chest wall on 7/11/2019.  His case was unique in that he has a diagnosis of von Willebrand's disease which required special perioperative management of his bleeding disorder.  He did well and there were no postoperative complications.  Patient states that beginning 10/1/2024, he started to get the message in his patient  that he needed to have the generator/battery replaced.  It has been more than 5 years since his implantation.  He states that he has been doing well and he is getting good benefit from the DBS therapy.  He denies any issues with his device.  There is no known open or short circuit.  He denies any abnormal shocks or undesirable stimulation.  He denies any issues with the location of his generator/battery.    Briefly, he is a 64-year-old right-handed male with Parkinson's disease that was diagnosed in 2006.  In summary, he had a right hand tremor which was worse with typing.  He noticed that his tremors were worse which had spread to both feet and his left hand.  He had difficulty with eating and buttoning his clothes, but he could still complete these tasks without assistance.  He also reported being unsteady when standing with his eyes closed.  No falls had been reported.  He  "felt that his social life was limited due to having to coordinate his medications and meals.  His goal for DBS was to control his shaking in his hands and both of his feet.  Additionally, he would like to live a more \"normal life\" while using less medication.  In terms of his von Willebrand's disease, he did report that he bruises easily and once when he needed stitches for his face, his entire face was bruised due to bleeding.  He was seen and evaluated by Dr. Ma of Hematology at [facility].  The plan was for the patient to be given transfusion to achieve factor level goals in order to reduce his surgical bleeding risk to a reasonable level, similar to those undergoing the surgery without the von Willebrand's disease.  He completed his DBS workup and his case was discussed at the Movement Disorder Consensus Group Meeting.  He was considered to be a good candidate in terms of DBS, bilateral candidate using Abbott system.      In order to reduce his overall risk of the procedure and the need for treatment and monitoring of this von Willebrand s disease, the following plan was formulated in conjunction with Dr. Ma of Hematology.  Patient was scheduled to be admitted on Monday, July 8th for preoperative evaluation and preoperative treatment.  All vWF concentrate was dosed by Hematology Consult team.  He was given 2200 rvWF  (Vonvendi) at 60 units/kg IV.  Orders was written by BETH Carrasquillo, [facility].  On Tuesday, July 9th, patient underwent DBS stimulator placement, phase I.  Preoperatively, at 6 am, vWF antigen, vWF activity, F8 levels was drawn.  Special coagulation lab had been alerted to run these samples first thing in the AM.  If vWF level > 90% and F8 > 90% then, he would be cleared for surgery without further hematology input.  No intra-operative levels was need to be drawn.  Postoperatively, vWF antigen, activity and F8 labs was sent.  On Wednesday, July 10th, patient " remain in hospital and morning and morning vWF antigen, activity, F8 labs was sent with goal > 80% vWF activity and F8 activity.  On Thursday July 11th, patient underwent DBS generator/battery placement with morning vWF antigen, activity, F8 labs to be drawn with goal > 80% vWF activity and F8 activity.  The plan, if no complications, was to have the patient discharged to home on, Friday July 12th, with hematology to arrange for outpatient infusions if needed.       Past Medical History:   Diagnosis Date    Encounter for neuropsychological testing 9/16/2018    IMPRESSIONS AND RECOMMENDATIONS   Current results indicate impairments in learning, which are somewhat variable, and memory that falls well within normal limits. Language, visual processing, complex attentional processing, and executive functioning fall within normal limits, generally in the above average range. He denied experiencing significant depressive symptomatology, apathy, or anxiety.   Th    Esophageal reflux 7/29/2018    H/O magnetic resonance imaging of brain and brain stem 8/31/2018    MR BRAIN W/O & W CONTRAST 8/31/2018 2:30 PM  History: ; Parkinson's disease (H) ICD-10: Parkinson's disease (H)  Comparison:  3/14/2006    Technique: Midline sagittal T1-weighted images and axial diffusion with images through the whole brain were obtained. Axial and coronal T2-weighted images, and inversion recovery images were obtained with focus on the evans through the corpus callosum, obtained     Hx of von Willebrand's disease 7/29/2018    Parkinson disease (H) 7/29/2018       Past Surgical History:   Procedure Laterality Date    IMPLANT DEEP BRAIN STIMULATION GENERATOR / BATTERY Left 7/11/2019    Procedure: Deep Brain Stimulator Placement, Phase II, Placement Of Deep Brain Stimulator Generator/Battery Over The Left Chest Wall;  Surgeon: Sidney Cardozo MD;  Location: [facility] OR    OPTICAL TRACKING SYSTEM INSERTION DEEP BRAIN STIMULATION BILATERAL Bilateral  7/9/2019    Procedure: Stealth Assisted Bilateral Deep Brain Stimulator Placement, Phase I, Placement Of Bilateral Deep Brain Stimulator Electrode, Target Bilateral Subthalamic Nucleus With Microelectrode Recording;  Surgeon: Sidney Cardozo MD;  Location:  OR    [facility] ORAL SURGERY PROCEDURE  1986    wisdom teeth surgery       Family History   Problem Relation Age of Onset    Eye Disorder Mother         dry eyes ? glaucoma    Diabetes Father     Blood Disease Father         von willendbrand    Skin Cancer Father     Nephrolithiasis Brother     Nephrolithiasis Brother     Blood Disease Son         chung baum       Social History     Socioeconomic History    Marital status:      Spouse name: Not on file    Number of children: Not on file    Years of education: Not on file    Highest education level: Not on file   Occupational History    Not on file   Tobacco Use    Smoking status: Never    Smokeless tobacco: Never   Substance and Sexual Activity    Alcohol use: No    Drug use: Never    Sexual activity: Not on file   Other Topics Concern    Parent/sibling w/ CABG, MI or angioplasty before 65F 55M? Not Asked   Social History Narrative    . lives in [place]. Carmen spouse    Born in [facility]    Grew up in [place] [facility] till moved to [place] and grew up there from age 7 yrs.     No family history     to [place] since 1987     Social Determinants of Health     Financial Resource Strain: Not on file   Food Insecurity: Not on file   Transportation Needs: Not on file   Physical Activity: Not on file   Stress: Not on file   Social Connections: Not on file   Interpersonal Safety: Not on file   Housing Stability: Not on file          Allergies   Allergen Reactions    Penicillins      Hives    Pramipexole      Leg swelling and compulsive behavior       Current Outpatient Medications   Medication Sig Dispense Refill    carbidopa-levodopa (SINEMET)  MG tablet Take Sinemet 25/100  mg by mouth 1 tablet as needed for leg/foot cramping.  May take 2 tabs PRN/day. 180 tablet 3    carbidopa-levodopa ER (RYTARY) 36. MG CPCR per CR capsule Take 2 capsules by mouth 5 times daily 2 capsules @ 7-830 am, 11am-1230pm, 3-430pm; 7-830 pm and 11pm-1230am 900 capsule 1    celecoxib (CELEBREX) 200 MG capsule TAKE 1 CAPSULE DAILY 30 capsule 5    Cholecalciferol (VITAMIN D) 2000 units tablet Take 2,000 Units by mouth every morning 2000 units by mouth daily @ 7-830am 100 tablet 3    Multiple Vitamins-Minerals (CENTRUM SILVER) per tablet Take 1 tablet by mouth every morning Centrum vitamin by mouth daily @ 7-830am 30 tablet     rOPINIRole (REQUIP) 2 MG tablet Take 1 tablet (2 mg) by mouth 3 times daily 2mg tab by mouth @ 7-830am, 3-430pm and 11pm-1230am 270 tablet 3    tranexamic acid (LYSTEDA) 650 MG tablet Take 2 tablets (1,300 mg) by mouth See Admin Instructions. Take 2 tablets (1300 mg) PO Q 12 hours for 7 days post operatively. 28 tablet 0     No current facility-administered medications for this visit.       REVIEW OF SYSTEMS  General: POSITIVE for difficulty sleeping.  Negative for difficulty sleeping and headaches, chills/sweats/fever, fatigue, weight gain or loss.  Skin: negative for color changes of the hands or feet in the cold, chronic skin itching, poor scarring/non-healing ulcer, skin rashes or hives, or unusual moles.   Eyes: POSITIVE for cataracts - early stages, left eye?.  Negative for blurred vision, crossed eyes, double vision, eye infection or vision flashes or halos  Ears/Nose/Throat: negative for bleeding gums, difficulty swallowing, negative for earache, ear discharge or hearing loss.  Respiratory: POSITIVE for sleep apnea - uses CPAP.  Negative for asthma, chronic cough, coughing up blood, night sweats tuberculosis, wheezing.  Cardiovascular: negative for lower extremity swelling, chest pain, difficulty breathing at night, irregular heart beat, pacemaker, varicose  "vein.  Gastrointestinal: POSITIVE for heartburn. Negative for black stools, blood in stool, chronic diarrhea, Hepatitis A, B, C, constipation, liver disease, nausea, vomiting.  Genitourinary: POSITIVE for enlarging renal cyst.  Negative for difficulty with urination, discharges, urgency, urinary tract infection.  Musculoskeletal: negative for arthritis, joint swelling, muscle tenderness, osteoporosis.  Neurologic: POSITIVE for Parkinson's disease. Negative for headaches, numbness of arms or legs, problem with memory, tingling of hands, arms or legs.  Psychiatric: POSITIVE for little bit of anxiety.  Negative for depression, panic attacks, restlessness  Hematologic/Lymphatic/Immunologic: POSITIVE for easy bruising and bleeding - VWF disease. Negative for easy skin bruising, marked fatigue, prolonged bleeding from cuts or tooth extractions, tender glands/lymph nodes.  Endocrine: negative for excessive hunger/thirst, intolerance to warm room, loss of libido, multiple broken bones, rapid weight gain/loss, thyroid problems, spontaneous nipple discharge.  Allergic: negative for hay fever or asthma.      PHYSICAL EXAM  /71 (BP Location: Right arm, Patient Position: Sitting, Cuff Size: Adult Regular)   Pulse 94   Resp 16   Ht 1.727 m (5' 8\")   Wt 83.5 kg (184 lb 1.6 oz)   SpO2 94%   BMI 27.99 kg/m      General: Awake, alert, oriented. Well nourished, well developed, he is not in any acute distress.  HEENT: Head normocephalic, atraumatic. No carotid bruit. Neck supple. Good range of motion. No palpable thyroid mass.  Heart: Regular rhythm and rate. No murmurs.  Lungs: Clear to auscultation and percussion bilaterally. No ronchi, rales or wheeze.  Abdomen: Soft, non-tender, non-distended. No hepatosplenomegaly.  Extremity: Warm with no clubbing or cyanosis. No lower extremity edema.  Incisions: Bilateral frontal incisions, left parietal incision and left chest wall incisions - all well healed.  No exposed hardware. "  No wound breakdown.    Neurological:  Awake, alert and oriented to date, time, place and person. Speech fluent.   Pupils equal, round, reactive to light.  Extraocular movement intact.  Visual fields are full on confrontation.  Hearing is grossly normal to finger rub.   Facial sensation intact.  Face symmetric.  Tongue midline.  Uvula elevates equally.    Motor: full strength throughout.  Sensation: intact to light touch and pinpoint.  Deep tendon reflexes: 1+ throughout. Negative for clonus. Negative for Russo's sign. No dysmetria.      ASSESSMENT   64 year old right handed male  Parkinson s disease  Bilateral tremor  Von Willebrand s disease    Status post bilateral deep brain stimulator placement, phase I, placement of bilateral deep brain stimulator electrodes, target bilateral subthalamic nucleus, with microelectrode recording on 7/9/2019  Status post deep brain stimulator placement, phase II, placement of deep brain stimulator generator/battery over the left chest wall on 7/11/2019  Depleted DBS battery/generator over the left chest wall.    Mr. Dion Villavicencio presents with depleted DBS generator/battery over his left chest wall.  He began getting messages about his depleted DBS generator/battery from his  starting on 10/1/2024.      We discussed the option of a rechargeable generator/battery, Liberta RC.  Although his generator/battery is lasting him 5 years, he does have von Willebrand's disease which increases his risk of bleeding each time he has surgical intervention.  Given that he would reduce the number of generator/battery replacement surgeries with a rechargeable system, it would be best to consider a rechargeable generator/battery.  Patient and his wife are in agreement.  We did briefly discuss the advantages and disadvantages of a rechargeable generator/battery.  The rechargeable generator/battery is smaller and would last at least 12 to 15 years.  However, he would have to keep it  maintained/charged.  Patient and wife feel that this would be manageable and that in itself is worth the reduction in the total number of surgeries going forward.    During today's visit, we discussed the surgical procedure for replacing the DBS generator/battery over the left chest wall.  He currently has the Abbott Infinity 7 generator/battery, and this will be upgraded to Pratt Liberta RC, rechargeable generator/battery, during the upcoming procedure.  Risks, benefits, alternative therapies were discussed with the patient, including but not limited to infection and bleeding.  This surgical procedure will be performed under MAC with local anesthetic.  The thinned part of the wound/pocket may be corrected with mobilization of the tissue under the incision.  The pocket may need to be enlarged to minimize the stress on the closure.  However, since the rechargeable generator/battery is much smaller, this may not be needed.  Surgical procedure was discussed in detail.  All questions were answered, and he expressed understanding    As for the perioperative management of his von Willebrand's disease, I will reach out to Dr. Riley and his team for recommendation.  The surgery would be much less riskier than his original phase I and II surgeries and there would be minimal blood loss.  Previous recommendation was as follows.    1. Majority of today's visit was spent counseling the patient regarding treatment of vWD around major surgical procedures.  I did explain that even with achieving our goal factor levels he is still at risk for bleeding as all patients to undergo this procedure are.  2. Humate-P 60 RCo Units/kg x 1 prior to surgery  3. Check vWF Panel (vWF antigen, activity and F8) 1 hour post surgery and at AM draw on post op day 1.  This will inform his kinetics and help with planning his further Humate doses to keep vWF activity ~100.  He will almost certainly require a second dose of Humate P prior to discharge  on post op day 1.  4. We will help arrange for home infusion and for the schedule for factor replacement based on #3.  5. We will make a separate plan for placement of the battery pack device based on experience with his first procedure.    Given that he would need preoperative infusion, we will plan for a second case or later in the day case.        PLAN  Preop labs today  Complete history and physical exam was performed  Replacement and upgrade of deep brain stimulator generator/battery over the left chest wall.  We will plan for 10/31/2024 at 1:30 PM  Await Dr. Riley's recommendation      43 minutes were spent face to face with the patient of which more than 50% of the time was spent counseling and discussing the above issues regarding diagnosis, differential, treatment options, and steps for further evaluation.  5 minutes were spent reviewing patient's chart.  30 minutes were spent on documentation for this encounter.  78 minutes total were spent on this encounter today.      ADDENDUM  I reached out to Dr. Riley and his team and the following recommendation has been made regarding perioperative management of the von Willebrand's disease.    Dr. Riley and HAI Ding have agreed on the following as the Bleeding Disorder Treatment Plan for his upcoming surgery.    -Infuse Humate-P at 60 RCo Units/kg IV bolus at 4 mL/min just 30-60 minutes prior to his surgical procedure.  Order is placed in [employer] on 10/14/2024 to give this dose at pre-surgery anesthesia unit on the day of procedure.     -Infusion of IV tranexamic acid at 1 gram over 10 min starting 30 minutes prior to surgery. He can continue this during intra-operatively.  Order for this is placed by this writer in [facility] on 10/14/2024.     -Oral tranexamic acid at 1300 mg PO BID x 7 days post procedure.  Prescription placed and sent to our clinic's pharmacy on 10/14/2024.     The patient is aware of this plan and is in agreement.

## 2024-10-14 NOTE — NURSING NOTE
Chief Complaint   Patient presents with    New Patient       Here for a consult, confirmed with patient     Kristin Lake

## 2024-10-14 NOTE — LETTER
10/14/2024       RE: Dion Villavicencio  [address] [facility] [address]     Dear Colleague,    Thank you for referring your patient, Dion Villavicencio, to the [facility] NEUROSURGERY CLINIC [place] at [facility]. Please see a copy of my visit note below.    NEUROSURGERY    HISTORY AND PHYSICAL EXAM    Chief Complaint   Patient presents with     New Patient     Parkinson's disease.  S/p bilateral DBS placement.  Depleted DBS generator/battery over the left chest wall.  Von Willebrand's disease.       HISTORY OF PRESENT ILLNESS  Mr. Dion Villavicencio is a 64 year old right handed male who is familiar to me.  He has a diagnosis of Parkinson's disease and he is status post bilateral deep brain stimulator placement, phase I, placement of bilateral deep brain stimulator electrodes, target bilateral subthalamic nucleus, with microelectrode recording on 7/9/2019 and status post deep brain stimulator placement, phase II, placement of deep brain stimulator generator/battery over the left chest wall on 7/11/2019.  His case was unique in that he has a diagnosis of von Willebrand's disease which required special perioperative management of his bleeding disorder.  He did well and there were no postoperative complications.  Patient states that beginning 10/1/2024, he started to get the message in his patient  that he needed to have the generator/battery replaced.  It has been more than 5 years since his implantation.  He states that he has been doing well and he is getting good benefit from the DBS therapy.  He denies any issues with his device.  There is no known open or short circuit.  He denies any abnormal shocks or undesirable stimulation.  He denies any issues with the location of his generator/battery.    Briefly, he is a 64-year-old right-handed male with Parkinson's disease that was diagnosed in 2006.  In summary, he had a right hand tremor which was worse with  "typing.  He noticed that his tremors were worse which had spread to both feet and his left hand.  He had difficulty with eating and buttoning his clothes, but he could still complete these tasks without assistance.  He also reported being unsteady when standing with his eyes closed.  No falls had been reported.  He felt that his social life was limited due to having to coordinate his medications and meals.  His goal for DBS was to control his shaking in his hands and both of his feet.  Additionally, he would like to live a more \"normal life\" while using less medication.  In terms of his von Willebrand's disease, he did report that he bruises easily and once when he needed stitches for his face, his entire face was bruised due to bleeding.  He was seen and evaluated by Dr. Ma of Hematology at [facility].  The plan was for the patient to be given transfusion to achieve factor level goals in order to reduce his surgical bleeding risk to a reasonable level, similar to those undergoing the surgery without the von Willebrand's disease.  He completed his DBS workup and his case was discussed at the Movement Disorder Consensus Group Meeting.  He was considered to be a good candidate in terms of DBS, bilateral candidate using Abbott system.      In order to reduce his overall risk of the procedure and the need for treatment and monitoring of this von Willebrand s disease, the following plan was formulated in conjunction with Dr. Ma of Hematology.  Patient was scheduled to be admitted on Monday, July 8th for preoperative evaluation and preoperative treatment.  All vWF concentrate was dosed by Hematology Consult team.  He was given 2200 rvWF  (Vonvendi) at 60 units/kg IV.  Orders was written by BETH Carrasquillo, [facility].  On Tuesday, July 9th, patient underwent DBS stimulator placement, phase I.  Preoperatively, at 6 am, vWF antigen, vWF activity, F8 levels was drawn.  Special coagulation lab " had been alerted to run these samples first thing in the AM.  If vWF level > 90% and F8 > 90% then, he would be cleared for surgery without further hematology input.  No intra-operative levels was need to be drawn.  Postoperatively, vWF antigen, activity and F8 labs was sent.  On Wednesday, July 10th, patient remain in hospital and morning and morning vWF antigen, activity, F8 labs was sent with goal > 80% vWF activity and F8 activity.  On Thursday July 11th, patient underwent DBS generator/battery placement with morning vWF antigen, activity, F8 labs to be drawn with goal > 80% vWF activity and F8 activity.  The plan, if no complications, was to have the patient discharged to home on, Friday July 12th, with hematology to arrange for outpatient infusions if needed.       Past Medical History:   Diagnosis Date     Encounter for neuropsychological testing 9/16/2018    IMPRESSIONS AND RECOMMENDATIONS   Current results indicate impairments in learning, which are somewhat variable, and memory that falls well within normal limits. Language, visual processing, complex attentional processing, and executive functioning fall within normal limits, generally in the above average range. He denied experiencing significant depressive symptomatology, apathy, or anxiety.   Th     Esophageal reflux 7/29/2018     H/O magnetic resonance imaging of brain and brain stem 8/31/2018    MR BRAIN W/O & W CONTRAST 8/31/2018 2:30 PM  History: ; Parkinson's disease (H) ICD-10: Parkinson's disease (H)  Comparison:  3/14/2006    Technique: Midline sagittal T1-weighted images and axial diffusion with images through the whole brain were obtained. Axial and coronal T2-weighted images, and inversion recovery images were obtained with focus on the evans through the corpus callosum, obtained      Hx of von Willebrand's disease 7/29/2018     Parkinson disease (H) 7/29/2018       Past Surgical History:   Procedure Laterality Date     IMPLANT DEEP BRAIN  STIMULATION GENERATOR / BATTERY Left 7/11/2019    Procedure: Deep Brain Stimulator Placement, Phase II, Placement Of Deep Brain Stimulator Generator/Battery Over The Left Chest Wall;  Surgeon: Sidney Cardozo MD;  Location: [facility] OR     OPTICAL TRACKING SYSTEM INSERTION DEEP BRAIN STIMULATION BILATERAL Bilateral 7/9/2019    Procedure: Stealth Assisted Bilateral Deep Brain Stimulator Placement, Phase I, Placement Of Bilateral Deep Brain Stimulator Electrode, Target Bilateral Subthalamic Nucleus With Microelectrode Recording;  Surgeon: Sidney Cardozo MD;  Location:  OR     [facility] [place] ORAL SURGERY PROCEDURE  1986    wisdom teeth surgery       Family History   Problem Relation Age of Onset     Eye Disorder Mother         dry eyes ? glaucoma     Diabetes Father      Blood Disease Father         von willendbrand     Skin Cancer Father      Nephrolithiasis Brother      Nephrolithiasis Brother      Blood Disease Son         chung baum       Social History     Socioeconomic History     Marital status:      Spouse name: Not on file     Number of children: Not on file     Years of education: Not on file     Highest education level: Not on file   Occupational History     Not on file   Tobacco Use     Smoking status: Never     Smokeless tobacco: Never   Substance and Sexual Activity     Alcohol use: No     Drug use: Never     Sexual activity: Not on file   Other Topics Concern     Parent/sibling w/ CABG, MI or angioplasty before 65F 55M? Not Asked   Social History Narrative    . lives in [place]. Carmen spouse    Born in [facility]    Grew up in [place] till moved to [place] and grew up there from age 7 yrs.     No family history     to Carmen since 1987     Social Determinants of Health     Financial Resource Strain: Not on file   Food Insecurity: Not on file   Transportation Needs: Not on file   Physical Activity: Not on file   Stress: Not on file   Social Connections: Not  on file   Interpersonal Safety: Not on file   Housing Stability: Not on file          Allergies   Allergen Reactions     Penicillins      Hives     Pramipexole      Leg swelling and compulsive behavior       Current Outpatient Medications   Medication Sig Dispense Refill     carbidopa-levodopa (SINEMET)  MG tablet Take Sinemet 25/100 mg by mouth 1 tablet as needed for leg/foot cramping.  May take 2 tabs PRN/day. 180 tablet 3     carbidopa-levodopa ER (RYTARY) 36. MG CPCR per CR capsule Take 2 capsules by mouth 5 times daily 2 capsules @ 7-830 am, 11am-1230pm, 3-430pm; 7-830 pm and 11pm-1230am 900 capsule 1     celecoxib (CELEBREX) 200 MG capsule TAKE 1 CAPSULE DAILY 30 capsule 5     Cholecalciferol (VITAMIN D) 2000 units tablet Take 2,000 Units by mouth every morning 2000 units by mouth daily @ 7-830am 100 tablet 3     Multiple Vitamins-Minerals (CENTRUM SILVER) per tablet Take 1 tablet by mouth every morning Centrum vitamin by mouth daily @ 7-830am 30 tablet      rOPINIRole (REQUIP) 2 MG tablet Take 1 tablet (2 mg) by mouth 3 times daily 2mg tab by mouth @ 7-830am, 3-430pm and 11pm-1230am 270 tablet 3     tranexamic acid (LYSTEDA) 650 MG tablet Take 2 tablets (1,300 mg) by mouth See Admin Instructions. Take 2 tablets (1300 mg) PO Q 12 hours for 7 days post operatively. 28 tablet 0     No current facility-administered medications for this visit.       REVIEW OF SYSTEMS  General: POSITIVE for difficulty sleeping.  Negative for difficulty sleeping and headaches, chills/sweats/fever, fatigue, weight gain or loss.  Skin: negative for color changes of the hands or feet in the cold, chronic skin itching, poor scarring/non-healing ulcer, skin rashes or hives, or unusual moles.   Eyes: POSITIVE for cataracts - early stages, left eye?.  Negative for blurred vision, crossed eyes, double vision, eye infection or vision flashes or halos  Ears/Nose/Throat: negative for bleeding gums, difficulty swallowing, negative  "for earache, ear discharge or hearing loss.  Respiratory: POSITIVE for sleep apnea - uses CPAP.  Negative for asthma, chronic cough, coughing up blood, night sweats tuberculosis, wheezing.  Cardiovascular: negative for lower extremity swelling, chest pain, difficulty breathing at night, irregular heart beat, pacemaker, varicose vein.  Gastrointestinal: POSITIVE for heartburn. Negative for black stools, blood in stool, chronic diarrhea, Hepatitis A, B, C, constipation, liver disease, nausea, vomiting.  Genitourinary: POSITIVE for enlarging renal cyst.  Negative for difficulty with urination, discharges, urgency, urinary tract infection.  Musculoskeletal: negative for arthritis, joint swelling, muscle tenderness, osteoporosis.  Neurologic: POSITIVE for Parkinson's disease. Negative for headaches, numbness of arms or legs, problem with memory, tingling of hands, arms or legs.  Psychiatric: POSITIVE for little bit of anxiety.  Negative for depression, panic attacks, restlessness  Hematologic/Lymphatic/Immunologic: POSITIVE for easy bruising and bleeding - VWF disease. Negative for easy skin bruising, marked fatigue, prolonged bleeding from cuts or tooth extractions, tender glands/lymph nodes.  Endocrine: negative for excessive hunger/thirst, intolerance to warm room, loss of libido, multiple broken bones, rapid weight gain/loss, thyroid problems, spontaneous nipple discharge.  Allergic: negative for hay fever or asthma.      PHYSICAL EXAM  /71 (BP Location: Right arm, Patient Position: Sitting, Cuff Size: Adult Regular)   Pulse 94   Resp 16   Ht 1.727 m (5' 8\")   Wt 83.5 kg (184 lb 1.6 oz)   SpO2 94%   BMI 27.99 kg/m      General: Awake, alert, oriented. Well nourished, well developed, he is not in any acute distress.  HEENT: Head normocephalic, atraumatic. No carotid bruit. Neck supple. Good range of motion. No palpable thyroid mass.  Heart: Regular rhythm and rate. No murmurs.  Lungs: Clear to auscultation " and percussion bilaterally. No ronchi, rales or wheeze.  Abdomen: Soft, non-tender, non-distended. No hepatosplenomegaly.  Extremity: Warm with no clubbing or cyanosis. No lower extremity edema.  Incisions: Bilateral frontal incisions, left parietal incision and left chest wall incisions - all well healed.  No exposed hardware.  No wound breakdown.    Neurological:  Awake, alert and oriented to date, time, place and person. Speech fluent.   Pupils equal, round, reactive to light.  Extraocular movement intact.  Visual fields are full on confrontation.  Hearing is grossly normal to finger rub.   Facial sensation intact.  Face symmetric.  Tongue midline.  Uvula elevates equally.    Motor: full strength throughout.  Sensation: intact to light touch and pinpoint.  Deep tendon reflexes: 1+ throughout. Negative for clonus. Negative for Russo's sign. No dysmetria.      ASSESSMENT   64 year old right handed male  Parkinson s disease  Bilateral tremor  Von Willebrand s disease    Status post bilateral deep brain stimulator placement, phase I, placement of bilateral deep brain stimulator electrodes, target bilateral subthalamic nucleus, with microelectrode recording on 7/9/2019  Status post deep brain stimulator placement, phase II, placement of deep brain stimulator generator/battery over the left chest wall on 7/11/2019  Depleted DBS battery/generator over the left chest wall.    Mr. Dion Villavicencio presents with depleted DBS generator/battery over his left chest wall.  He began getting messages about his depleted DBS generator/battery from his  starting on 10/1/2024.      We discussed the option of a rechargeable generator/battery, Liberta RC.  Although his generator/battery is lasting him 5 years, he does have von Willebrand's disease which increases his risk of bleeding each time he has surgical intervention.  Given that he would reduce the number of generator/battery replacement surgeries with a rechargeable  system, it would be best to consider a rechargeable generator/battery.  Patient and his wife are in agreement.  We did briefly discuss the advantages and disadvantages of a rechargeable generator/battery.  The rechargeable generator/battery is smaller and would last at least 12 to 15 years.  However, he would have to keep it maintained/charged.  Patient and wife feel that this would be manageable and that in itself is worth the reduction in the total number of surgeries going forward.    During today's visit, we discussed the surgical procedure for replacing the DBS generator/battery over the left chest wall.  He currently has the Abbott Infinity 7 generator/battery, and this will be upgraded to [employer]att Liberta RC, rechargeable generator/battery, during the upcoming procedure.  Risks, benefits, alternative therapies were discussed with the patient, including but not limited to infection and bleeding.  This surgical procedure will be performed under MAC with local anesthetic.  The thinned part of the wound/pocket may be corrected with mobilization of the tissue under the incision.  The pocket may need to be enlarged to minimize the stress on the closure.  However, since the rechargeable generator/battery is much smaller, this may not be needed.  Surgical procedure was discussed in detail.  All questions were answered, and he expressed understanding    As for the perioperative management of his von Willebrand's disease, I will reach out to Dr. Riley and his team for recommendation.  The surgery would be much less riskier than his original phase I and II surgeries and there would be minimal blood loss.  Previous recommendation was as follows.    1. Majority of today's visit was spent counseling the patient regarding treatment of vWD around major surgical procedures.  I did explain that even with achieving our goal factor levels he is still at risk for bleeding as all patients to undergo this procedure are.  2. Dee-P  60 RCo Units/kg x 1 prior to surgery  3. Check vWF Panel (vWF antigen, activity and F8) 1 hour post surgery and at AM draw on post op day 1.  This will inform his kinetics and help with planning his further Humate doses to keep vWF activity ~100.  He will almost certainly require a second dose of Humate P prior to discharge on post op day 1.  4. We will help arrange for home infusion and for the schedule for factor replacement based on #3.  5. We will make a separate plan for placement of the battery pack device based on experience with his first procedure.    Given that he would need preoperative infusion, we will plan for a second case or later in the day case.        PLAN  Preop labs today  Complete history and physical exam was performed  Replacement and upgrade of deep brain stimulator generator/battery over the left chest wall.  We will plan for 10/31/2024 at 1:30 PM  Await Dr. Riley's recommendation      43 minutes were spent face to face with the patient of which more than 50% of the time was spent counseling and discussing the above issues regarding diagnosis, differential, treatment options, and steps for further evaluation.  5 minutes were spent reviewing patient's chart.  30 minutes were spent on documentation for this encounter.  78 minutes total were spent on this encounter today.      ADDENDUM  I reached out to Dr. Riley and his team and the following recommendation has been made regarding perioperative management of the von Willebrand's disease.    Dr. Riley and HAI Ding have agreed on the following as the Bleeding Disorder Treatment Plan for his upcoming surgery.    -Infuse Humate-P at 60 RCo Units/kg IV bolus at 4 mL/min just 30-60 minutes prior to his surgical procedure.  Order is placed in [facility] on 10/14/2024 to give this dose at pre-surgery anesthesia unit on the day of procedure.     -Infusion of IV tranexamic acid at 1 gram over 10 min starting 30 minutes prior to surgery. He can  continue this during intra-operatively.  Order for this is placed by this writer in [facility] on 10/14/2024.     -Oral tranexamic acid at 1300 mg PO BID x 7 days post procedure.  Prescription placed and sent to our clinic's pharmacy on 10/14/2024.     The patient is aware of this plan and is in agreement.    Again, thank you for allowing me to participate in the care of your patient.      Sincerely,    Sidney Cardozo MD

## 2024-10-14 NOTE — PROGRESS NOTES
[facility]  [facility]  [facility] [address], [address], [place] [address]  Main: [phone], Fax: [phone]    Bleeding Disorder Treatment Plan for his upcoming replacement and upgrade of deep brain stimulator generator/battery over left chest wall scheduled on 10/31/2024 at 13:30    Patient: Dion Villavicencio  MRN: [id number]  : 1960  Date of this note written: 2024    Brief History:  Dion is a 64 year old male with a history of severe Type I Von Willebrand Disease with his baseline factor VIII level of 30%; VWF:Ag of 15%, and VWF:Activity of <20%, as well as a history of refractory Parkinson's Disease S/P deep brain stimulator placement, 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     Bleeding Disorder Treatment Plan for his upcoming surgery is as below:  Infuse Humate-P at 60 RCo Units/kg IV bolus at 4 mL/min just 30-60 minutes prior to his surgical procedure. Order is placed in [employer] on 10/14/2024 to give this dose at pre-surgery anesthesia unit on the day of procedure.   Infusion of IV tranexamic acid at 1 gram over 10 min starting 30 minutes prior to surgery. He can continue this during intra-operatively. Order for this is placed by this writer in [facility] on 10/14/2024.   Oral tranexamic acid at 1300 mg PO BID x 7 days post procedure. Prescription placed and sent to our clinic's pharmacy on 10/14/2024.     Case discussed with Dr. Douglas Riley, staff hematologist. He agrees with the above plan.       Vern Ding PA-C, MPAS  Physician Assistant  [facility].

## 2024-10-28 ENCOUNTER — TELEPHONE (OUTPATIENT)
Dept: NEUROSURGERY | Facility: CLINIC | Age: 64
End: 2024-10-28
Payer: MEDICARE

## 2024-10-28 RX ORDER — MIRABEGRON 50 MG/1
50 TABLET, FILM COATED, EXTENDED RELEASE ORAL DAILY
COMMUNITY
Start: 2024-09-30 | End: 2025-09-30

## 2024-10-28 RX ORDER — TADALAFIL 5 MG/1
1 TABLET ORAL
COMMUNITY
Start: 2024-09-15

## 2024-10-30 ENCOUNTER — ANESTHESIA EVENT (OUTPATIENT)
Dept: SURGERY | Facility: CLINIC | Age: 64
End: 2024-10-30
Payer: MEDICARE

## 2024-10-30 ASSESSMENT — LIFESTYLE VARIABLES: TOBACCO_USE: 0

## 2024-10-30 NOTE — ANESTHESIA PREPROCEDURE EVALUATION
Anesthesia Pre-Procedure Evaluation    Patient: Dion Villavicencio   MRN: [id number] : 1960        Procedure : Procedure(s):  Replacement and upgrade of deep brain stimulator generator/battery over left chest wall          Past Medical History:   Diagnosis Date    Encounter for neuropsychological testing 2018    IMPRESSIONS AND RECOMMENDATIONS   Current results indicate impairments in learning, which are somewhat variable, and memory that falls well within normal limits. Language, visual processing, complex attentional processing, and executive functioning fall within normal limits, generally in the above average range. He denied experiencing significant depressive symptomatology, apathy, or anxiety.   Th    Esophageal reflux 2018    H/O magnetic resonance imaging of brain and brain stem 2018    MR BRAIN W/O & W CONTRAST 2018 2:30 PM  History: ; Parkinson's disease (H) ICD-10: Parkinson's disease (H)  Comparison:  3/14/2006    Technique: Midline sagittal T1-weighted images and axial diffusion with images through the whole brain were obtained. Axial and coronal T2-weighted images, and inversion recovery images were obtained with focus on the evans through the corpus callosum, obtained     Hx of von Willebrand's disease 2018    Parkinson disease (H) 2018      Past Surgical History:   Procedure Laterality Date    IMPLANT DEEP BRAIN STIMULATION GENERATOR / BATTERY Left 2019    Procedure: Deep Brain Stimulator Placement, Phase II, Placement Of Deep Brain Stimulator Generator/Battery Over The Left Chest Wall;  Surgeon: Sidney Cardozo MD;  Location: [facility] OR    OPTICAL TRACKING SYSTEM INSERTION DEEP BRAIN STIMULATION BILATERAL Bilateral 2019    Procedure: Stealth Assisted Bilateral Deep Brain Stimulator Placement, Phase I, Placement Of Bilateral Deep Brain Stimulator Electrode, Target Bilateral Subthalamic Nucleus With Microelectrode Recording;  Surgeon: Sidney Cardozo,  MD;  Location:  OR    [facility] ORAL SURGERY PROCEDURE  1986    wisdom teeth surgery      Allergies   Allergen Reactions    Penicillins      Hives    Pramipexole      Leg swelling and compulsive behavior      Social History     Tobacco Use    Smoking status: Never    Smokeless tobacco: Never   Substance Use Topics    Alcohol use: No      Wt Readings from Last 1 Encounters:   10/14/24 83.5 kg (184 lb 1.6 oz)        Anesthesia Evaluation   Pt has had prior anesthetic. Type: General and MAC.    No history of anesthetic complications       ROS/MED HX  ENT/Pulmonary:     (+) sleep apnea, uses CPAP,                                   (-) tobacco use   Neurologic:     (+)                 Parkinson's disease,               Cardiovascular:     (+)  - -   -  - -                                 No previous cardiac testing  (-) hypertension, CAD, SANTILLAN and dyslipidemia   METS/Exercise Tolerance: >4 METS    Hematologic: Comments: VWD.     (+)       history of blood transfusion,         Musculoskeletal: Comment: Cervical radiculitis   (-) arthritis   GI/Hepatic:     (+) GERD, Asymptomatic on medication,               (-) liver disease   Renal/Genitourinary:    (-) renal disease   Endo:    (-) Type II DM and obesity   Psychiatric/Substance Use:  - neg psychiatric ROS  (-) psychiatric history and chronic opioid use history   Infectious Disease:  - neg infectious disease ROS     Malignancy:  - neg malignancy ROS     Other:      (+)  , no H/O Chronic Pain,            OUTSIDE LABS:  CBC:   Lab Results   Component Value Date    WBC 5.3 10/14/2024    WBC 5.9 11/12/2021    HGB 16.3 10/14/2024    HGB 16.8 11/12/2021    HCT 49.0 10/14/2024    HCT 50.8 11/12/2021     10/14/2024     11/12/2021     BMP:   Lab Results   Component Value Date     10/14/2024     07/15/2019    POTASSIUM 4.5 10/14/2024    POTASSIUM 4.2 07/15/2019    CHLORIDE 109 (H) 10/14/2024    CHLORIDE 107 07/15/2019    CO2 25 10/14/2024    CO2 27 07/15/2019  "   BUN 19.7 10/14/2024    BUN 16 07/15/2019    CR 0.97 10/14/2024    CR 0.79 07/15/2019     (H) 10/14/2024     (H) 07/15/2019     COAGS:   Lab Results   Component Value Date    PTT 33 10/14/2024    INR 0.94 10/14/2024     POC: No results found for: \"BGM\", \"HCG\", \"HCGS\"  HEPATIC: No results found for: \"ALBUMIN\", \"PROTTOTAL\", \"ALT\", \"AST\", \"GGT\", \"ALKPHOS\", \"BILITOTAL\", \"BILIDIRECT\", \"TOREY\"  OTHER:   Lab Results   Component Value Date    ELYSIA 9.1 10/14/2024    PHOS 4.7 (H) 07/10/2019    MAG 2.3 07/10/2019       Anesthesia Plan    ASA Status:  3    NPO Status:  NPO Appropriate    Anesthesia Type: MAC.     - Reason for MAC: immobility needed, straight local not clinically adequate   Induction: Propofol.   Maintenance: TIVA.   Techniques and Equipment:     - Lines/Monitors: BIS     Consents    Anesthesia Plan(s) and associated risks, benefits, and realistic alternatives discussed. Questions answered and patient/representative(s) expressed understanding.     - Discussed: Risks, Benefits and Alternatives for BOTH SEDATION and the PROCEDURE were discussed     - Discussed with:       - Extended Intubation/Ventilatory Support Discussed: Yes.      - Patient is DNR/DNI Status: No     Use of blood products discussed: Yes.     - Discussed with: Patient.     Postoperative Care    Pain management: IV analgesics, Oral pain medications, Multi-modal analgesia.   PONV prophylaxis: Ondansetron (or other 5HT-3), Background Propofol Infusion     Comments:    Other Comments: Factor VIII and VWF infusion prior to OR.            Sherman Wells MD    I have reviewed the pertinent notes and labs in the chart from the past 30 days and (re)examined the patient.  Any updates or changes from those notes are reflected in this note.                       # Overweight: Estimated body mass index is 27.99 kg/m  as calculated from the following:    Height as of 10/14/24: 1.727 m (5' 8\").    Weight as of 10/14/24: 83.5 kg (184 lb 1.6 oz).    "

## 2024-10-31 ENCOUNTER — HOSPITAL ENCOUNTER (OUTPATIENT)
Facility: CLINIC | Age: 64
Discharge: HOME | End: 2024-10-31
Attending: NEUROLOGICAL SURGERY | Admitting: NEUROLOGICAL SURGERY
Payer: MEDICARE

## 2024-10-31 ENCOUNTER — ANESTHESIA (OUTPATIENT)
Dept: SURGERY | Facility: CLINIC | Age: 64
End: 2024-10-31
Payer: MEDICARE

## 2024-10-31 VITALS
TEMPERATURE: 97.6 F | WEIGHT: 186.51 LBS | HEART RATE: 93 BPM | RESPIRATION RATE: 16 BRPM | HEIGHT: 68 IN | SYSTOLIC BLOOD PRESSURE: 99 MMHG | DIASTOLIC BLOOD PRESSURE: 75 MMHG | OXYGEN SATURATION: 94 % | BODY MASS INDEX: 28.27 KG/M2

## 2024-10-31 DIAGNOSIS — G20.A1 PARKINSON'S DISEASE, UNSPECIFIED WHETHER DYSKINESIA PRESENT, UNSPECIFIED WHETHER MANIFESTATIONS FLUCTUATE (H): Primary | ICD-10-CM

## 2024-10-31 DIAGNOSIS — Z96.89 STATUS POST DEEP BRAIN STIMULATOR PLACEMENT: ICD-10-CM

## 2024-10-31 DIAGNOSIS — D68.01 VON WILLEBRAND DISEASE, TYPE I (H): ICD-10-CM

## 2024-10-31 PROCEDURE — 250N000011 HC RX IP 250 OP 636

## 2024-10-31 PROCEDURE — 250N000015 HC RX FACTOR IP MED 636 OP 636: Mod: JZ | Performed by: PHYSICIAN ASSISTANT

## 2024-10-31 PROCEDURE — 272N000001 HC OR GENERAL SUPPLY STERILE: Performed by: NEUROLOGICAL SURGERY

## 2024-10-31 PROCEDURE — 710N000012 HC RECOVERY PHASE 2, PER MINUTE: Performed by: NEUROLOGICAL SURGERY

## 2024-10-31 PROCEDURE — 250N000011 HC RX IP 250 OP 636: Performed by: NEUROLOGICAL SURGERY

## 2024-10-31 PROCEDURE — C1787 PATIENT PROGR, NEUROSTIM: HCPCS | Performed by: NEUROLOGICAL SURGERY

## 2024-10-31 PROCEDURE — 360N000076 HC SURGERY LEVEL 3, PER MIN: Performed by: NEUROLOGICAL SURGERY

## 2024-10-31 PROCEDURE — 250N000009 HC RX 250

## 2024-10-31 PROCEDURE — 61886 IMPLANT NEUROSTIM ARRAYS: CPT | Mod: LT | Performed by: NEUROLOGICAL SURGERY

## 2024-10-31 PROCEDURE — 999N000141 HC STATISTIC PRE-PROCEDURE NURSING ASSESSMENT: Performed by: NEUROLOGICAL SURGERY

## 2024-10-31 PROCEDURE — 250N000013 HC RX MED GY IP 250 OP 250 PS 637

## 2024-10-31 PROCEDURE — 370N000017 HC ANESTHESIA TECHNICAL FEE, PER MIN: Performed by: NEUROLOGICAL SURGERY

## 2024-10-31 PROCEDURE — C1767 GENERATOR, NEURO NON-RECHARG: HCPCS | Performed by: NEUROLOGICAL SURGERY

## 2024-10-31 PROCEDURE — 250N000009 HC RX 250: Performed by: PHYSICIAN ASSISTANT

## 2024-10-31 PROCEDURE — 258N000003 HC RX IP 258 OP 636

## 2024-10-31 DEVICE — IMPLANTABLE DEVICE: Type: IMPLANTABLE DEVICE | Site: CHEST  WALL | Status: FUNCTIONAL

## 2024-10-31 RX ORDER — CEFAZOLIN SODIUM/WATER 2 G/20 ML
2 SYRINGE (ML) INTRAVENOUS
Status: COMPLETED | OUTPATIENT
Start: 2024-10-31 | End: 2024-10-31

## 2024-10-31 RX ORDER — FENTANYL CITRATE 50 UG/ML
INJECTION, SOLUTION INTRAMUSCULAR; INTRAVENOUS PRN
Status: DISCONTINUED | OUTPATIENT
Start: 2024-10-31 | End: 2024-10-31

## 2024-10-31 RX ORDER — DEXAMETHASONE SODIUM PHOSPHATE 4 MG/ML
INJECTION, SOLUTION INTRA-ARTICULAR; INTRALESIONAL; INTRAMUSCULAR; INTRAVENOUS; SOFT TISSUE PRN
Status: DISCONTINUED | OUTPATIENT
Start: 2024-10-31 | End: 2024-10-31

## 2024-10-31 RX ORDER — NALOXONE HYDROCHLORIDE 0.4 MG/ML
0.1 INJECTION, SOLUTION INTRAMUSCULAR; INTRAVENOUS; SUBCUTANEOUS
Status: DISCONTINUED | OUTPATIENT
Start: 2024-10-31 | End: 2024-10-31 | Stop reason: HOSPADM

## 2024-10-31 RX ORDER — ONDANSETRON 4 MG/1
4 TABLET, ORALLY DISINTEGRATING ORAL EVERY 30 MIN PRN
Status: DISCONTINUED | OUTPATIENT
Start: 2024-10-31 | End: 2024-10-31 | Stop reason: HOSPADM

## 2024-10-31 RX ORDER — CEPHALEXIN 500 MG/1
500 CAPSULE ORAL 3 TIMES DAILY
Qty: 21 CAPSULE | Refills: 0 | Status: SHIPPED | OUTPATIENT
Start: 2024-10-31 | End: 2024-10-31

## 2024-10-31 RX ORDER — GLYCOPYRROLATE 0.2 MG/ML
INJECTION, SOLUTION INTRAMUSCULAR; INTRAVENOUS PRN
Status: DISCONTINUED | OUTPATIENT
Start: 2024-10-31 | End: 2024-10-31

## 2024-10-31 RX ORDER — CEFAZOLIN SODIUM/WATER 2 G/20 ML
2 SYRINGE (ML) INTRAVENOUS SEE ADMIN INSTRUCTIONS
Status: DISCONTINUED | OUTPATIENT
Start: 2024-10-31 | End: 2024-10-31 | Stop reason: HOSPADM

## 2024-10-31 RX ORDER — FENTANYL CITRATE 50 UG/ML
50 INJECTION, SOLUTION INTRAMUSCULAR; INTRAVENOUS EVERY 5 MIN PRN
Status: DISCONTINUED | OUTPATIENT
Start: 2024-10-31 | End: 2024-10-31 | Stop reason: HOSPADM

## 2024-10-31 RX ORDER — OXYCODONE HYDROCHLORIDE 5 MG/1
5 TABLET ORAL EVERY 6 HOURS PRN
Qty: 12 TABLET | Refills: 0 | Status: SHIPPED | OUTPATIENT
Start: 2024-10-31 | End: 2024-11-03

## 2024-10-31 RX ORDER — DEXAMETHASONE SODIUM PHOSPHATE 4 MG/ML
4 INJECTION, SOLUTION INTRA-ARTICULAR; INTRALESIONAL; INTRAMUSCULAR; INTRAVENOUS; SOFT TISSUE
Status: DISCONTINUED | OUTPATIENT
Start: 2024-10-31 | End: 2024-10-31 | Stop reason: HOSPADM

## 2024-10-31 RX ORDER — SODIUM CHLORIDE, SODIUM LACTATE, POTASSIUM CHLORIDE, CALCIUM CHLORIDE 600; 310; 30; 20 MG/100ML; MG/100ML; MG/100ML; MG/100ML
INJECTION, SOLUTION INTRAVENOUS CONTINUOUS
Status: DISCONTINUED | OUTPATIENT
Start: 2024-10-31 | End: 2024-10-31 | Stop reason: HOSPADM

## 2024-10-31 RX ORDER — LIDOCAINE 40 MG/G
CREAM TOPICAL
Status: DISCONTINUED | OUTPATIENT
Start: 2024-10-31 | End: 2024-10-31 | Stop reason: HOSPADM

## 2024-10-31 RX ORDER — TRANEXAMIC ACID 10 MG/ML
1 INJECTION, SOLUTION INTRAVENOUS ONCE
Status: COMPLETED | OUTPATIENT
Start: 2024-10-31 | End: 2024-10-31

## 2024-10-31 RX ORDER — PROPOFOL 10 MG/ML
INJECTION, EMULSION INTRAVENOUS CONTINUOUS PRN
Status: DISCONTINUED | OUTPATIENT
Start: 2024-10-31 | End: 2024-10-31

## 2024-10-31 RX ORDER — OXYCODONE HYDROCHLORIDE 5 MG/1
5 TABLET ORAL
Status: DISCONTINUED | OUTPATIENT
Start: 2024-10-31 | End: 2024-10-31 | Stop reason: HOSPADM

## 2024-10-31 RX ORDER — OXYCODONE HYDROCHLORIDE 10 MG/1
10 TABLET ORAL
Status: DISCONTINUED | OUTPATIENT
Start: 2024-10-31 | End: 2024-10-31 | Stop reason: HOSPADM

## 2024-10-31 RX ORDER — ONDANSETRON 2 MG/ML
INJECTION INTRAMUSCULAR; INTRAVENOUS PRN
Status: DISCONTINUED | OUTPATIENT
Start: 2024-10-31 | End: 2024-10-31

## 2024-10-31 RX ORDER — ONDANSETRON 4 MG/1
4 TABLET, ORALLY DISINTEGRATING ORAL EVERY 30 MIN PRN
Status: DISCONTINUED | OUTPATIENT
Start: 2024-10-31 | End: 2024-10-31

## 2024-10-31 RX ORDER — HYDROMORPHONE HYDROCHLORIDE 1 MG/ML
0.4 INJECTION, SOLUTION INTRAMUSCULAR; INTRAVENOUS; SUBCUTANEOUS EVERY 5 MIN PRN
Status: DISCONTINUED | OUTPATIENT
Start: 2024-10-31 | End: 2024-10-31 | Stop reason: HOSPADM

## 2024-10-31 RX ORDER — ONDANSETRON 2 MG/ML
4 INJECTION INTRAMUSCULAR; INTRAVENOUS EVERY 30 MIN PRN
Status: DISCONTINUED | OUTPATIENT
Start: 2024-10-31 | End: 2024-10-31 | Stop reason: HOSPADM

## 2024-10-31 RX ORDER — LIDOCAINE HYDROCHLORIDE 20 MG/ML
INJECTION, SOLUTION INFILTRATION; PERINEURAL PRN
Status: DISCONTINUED | OUTPATIENT
Start: 2024-10-31 | End: 2024-10-31

## 2024-10-31 RX ORDER — CLINDAMYCIN HYDROCHLORIDE 300 MG/1
300 CAPSULE ORAL 4 TIMES DAILY
Qty: 28 CAPSULE | Refills: 0 | Status: SHIPPED | OUTPATIENT
Start: 2024-10-31 | End: 2024-11-07

## 2024-10-31 RX ORDER — ACETAMINOPHEN 325 MG/1
975 TABLET ORAL ONCE
Status: COMPLETED | OUTPATIENT
Start: 2024-10-31 | End: 2024-10-31

## 2024-10-31 RX ORDER — HYDROMORPHONE HYDROCHLORIDE 1 MG/ML
0.2 INJECTION, SOLUTION INTRAMUSCULAR; INTRAVENOUS; SUBCUTANEOUS EVERY 5 MIN PRN
Status: DISCONTINUED | OUTPATIENT
Start: 2024-10-31 | End: 2024-10-31 | Stop reason: HOSPADM

## 2024-10-31 RX ORDER — FENTANYL CITRATE 50 UG/ML
25 INJECTION, SOLUTION INTRAMUSCULAR; INTRAVENOUS EVERY 5 MIN PRN
Status: DISCONTINUED | OUTPATIENT
Start: 2024-10-31 | End: 2024-10-31 | Stop reason: HOSPADM

## 2024-10-31 RX ORDER — SODIUM CHLORIDE, SODIUM GLUCONATE, SODIUM ACETATE, POTASSIUM CHLORIDE AND MAGNESIUM CHLORIDE 526; 502; 368; 37; 30 MG/100ML; MG/100ML; MG/100ML; MG/100ML; MG/100ML
INJECTION, SOLUTION INTRAVENOUS CONTINUOUS PRN
Status: DISCONTINUED | OUTPATIENT
Start: 2024-10-31 | End: 2024-10-31

## 2024-10-31 RX ORDER — ONDANSETRON 2 MG/ML
4 INJECTION INTRAMUSCULAR; INTRAVENOUS EVERY 30 MIN PRN
Status: DISCONTINUED | OUTPATIENT
Start: 2024-10-31 | End: 2024-10-31

## 2024-10-31 RX ORDER — PROPOFOL 10 MG/ML
INJECTION, EMULSION INTRAVENOUS PRN
Status: DISCONTINUED | OUTPATIENT
Start: 2024-10-31 | End: 2024-10-31

## 2024-10-31 RX ADMIN — ONDANSETRON 4 MG: 2 INJECTION INTRAMUSCULAR; INTRAVENOUS at 14:28

## 2024-10-31 RX ADMIN — GLYCOPYRROLATE 0.1 MG: 0.2 INJECTION, SOLUTION INTRAMUSCULAR; INTRAVENOUS at 13:41

## 2024-10-31 RX ADMIN — TRANEXAMIC ACID 1 G: 10 INJECTION, SOLUTION INTRAVENOUS at 13:51

## 2024-10-31 RX ADMIN — Medication 2 G: at 13:37

## 2024-10-31 RX ADMIN — PROPOFOL 50 MG: 10 INJECTION, EMULSION INTRAVENOUS at 13:41

## 2024-10-31 RX ADMIN — ACETAMINOPHEN 975 MG: 325 TABLET, FILM COATED ORAL at 11:03

## 2024-10-31 RX ADMIN — FENTANYL CITRATE 25 MCG: 50 INJECTION INTRAMUSCULAR; INTRAVENOUS at 14:07

## 2024-10-31 RX ADMIN — PHENYLEPHRINE HYDROCHLORIDE 50 MCG: 10 INJECTION INTRAVENOUS at 14:39

## 2024-10-31 RX ADMIN — ANTIHEMOPHILIC FACTOR/VON WILLEBRAND FACTOR COMPLEX (HUMAN) 5230 VWF UNIT: KIT at 13:12

## 2024-10-31 RX ADMIN — DEXAMETHASONE SODIUM PHOSPHATE 4 MG: 4 INJECTION, SOLUTION INTRA-ARTICULAR; INTRALESIONAL; INTRAMUSCULAR; INTRAVENOUS; SOFT TISSUE at 13:55

## 2024-10-31 RX ADMIN — PROPOFOL 150 MCG/KG/MIN: 10 INJECTION, EMULSION INTRAVENOUS at 13:41

## 2024-10-31 RX ADMIN — SODIUM CHLORIDE, SODIUM GLUCONATE, SODIUM ACETATE, POTASSIUM CHLORIDE AND MAGNESIUM CHLORIDE: 526; 502; 368; 37; 30 INJECTION, SOLUTION INTRAVENOUS at 13:40

## 2024-10-31 RX ADMIN — LIDOCAINE HYDROCHLORIDE 80 MG: 20 INJECTION, SOLUTION INFILTRATION; PERINEURAL at 13:41

## 2024-10-31 RX ADMIN — PHENYLEPHRINE HYDROCHLORIDE 50 MCG: 10 INJECTION INTRAVENOUS at 14:25

## 2024-10-31 RX ADMIN — MIDAZOLAM 2 MG: 1 INJECTION INTRAMUSCULAR; INTRAVENOUS at 13:35

## 2024-10-31 ASSESSMENT — ACTIVITIES OF DAILY LIVING (ADL)
ADLS_ACUITY_SCORE: 0

## 2024-10-31 ASSESSMENT — VISUAL ACUITY: OU: NORMAL ACUITY

## 2024-10-31 NOTE — H&P
NEUROSURGERY    HISTORY AND PHYSICAL EXAM UPDATE    Patient reports no change in his health.  I saw the patient in the preoperative area and examined the patient.  He is prepped for the DBS generator/battery replacement and upgrade over his left chest wall.    Per Dr. Riley and Vern Ding PA-C, the following is done.    -Infuse Humate-P at 60 RCo Units/kg IV bolus at 4 mL/min just 30-60 minutes prior to his surgical procedure.  Order is placed in [facility] on 10/14/2024 to give this dose at pre-surgery anesthesia unit on the day of procedure.     This is planned to be administered around 1 pm today.     -Infusion of IV tranexamic acid at 1 gram over 10 min starting 30 minutes prior to surgery. He can continue this during intra-operatively.  Order for this is placed by this writer in [facility] on 10/14/2024.     This will be administered by the anesthesia team when the patient arrives in the room.  30 min prior to incision.     -Oral tranexamic acid at 1300 mg PO BID x 7 days post procedure.  Prescription placed and sent to our clinic's pharmacy on 10/14/2024.     Patient has this medication and will take it as directed.      ADAM HATHAWAY MD, PHD

## 2024-10-31 NOTE — ANESTHESIA CARE TRANSFER NOTE
Patient: Dion Villavicencio    Procedure: Procedure(s):  Replacement and upgrade of deep brain stimulator generator/battery over left chest wall       Diagnosis: End of battery life of deep brain stimulator [Z45.42]  Status post deep brain stimulator placement [Z96.89]  Parkinson's disease, unspecified whether dyskinesia present, unspecified whether manifestations fluctuate (H) [G20.A1]  Von Willebrand disease, type I (H) [D68.01]  Diagnosis Additional Information: No value filed.    Anesthesia Type:   MAC     Note:    Oropharynx: oropharynx clear of all foreign objects and spontaneously breathing  Level of Consciousness: awake  Oxygen Supplementation: room air    Independent Airway: airway patency satisfactory and stable  Dentition: dentition unchanged  Vital Signs Stable: post-procedure vital signs reviewed and stable  Report to RN Given: handoff report given  Patient transferred to: Phase II    Handoff Report: Identifed the Patient, Identified the Reponsible Provider, Reviewed the pertinent medical history, Discussed the surgical course, Reviewed Intra-OP anesthesia mangement and issues during anesthesia, Set expectations for post-procedure period and Allowed opportunity for questions and acknowledgement of understanding      Vitals:  Vitals Value Taken Time   BP     Temp     Pulse     Resp     SpO2 90 % 10/31/24 1507   Vitals shown include unfiled device data.    Electronically Signed By: DIETER Leiva CRNA  October 31, 2024  3:07 PM

## 2024-10-31 NOTE — DISCHARGE INSTRUCTIONS
Contacting your Doctor -   To contact a doctor, call Dr Cardozo at [phone] at the Neurosurgery Clinic from 8 am till 5 pm  or:  [phone] and ask for the resident on call for Neurology (answered 24 hours a day)   Emergency Department:  [facility]: [phone]  [facility]: [phone] if you are in need of immediate or emergent help

## 2024-10-31 NOTE — BRIEF OP NOTE
[facility]    Brief Operative Note    Pre-operative diagnosis: End of battery life of deep brain stimulator [Z45.42]  Status post deep brain stimulator placement [Z96.89]  Parkinson's disease, unspecified whether dyskinesia present, unspecified whether manifestations fluctuate (H) [G20.A1]  Von Willebrand disease, type I (H) [D68.01]  Post-operative diagnosis Same as pre-operative diagnosis    Procedure: Replacement and upgrade of deep brain stimulator generator/battery over left chest wall, Left - Chest    Surgeon: Surgeons and Role:     * Sidney Cardozo MD - Primary     * Argenis Delgadillo MD - Resident - Assisting  Anesthesia: MAC with Local   Estimated Blood Loss: 1 mL    Drains: None  Specimens: * No specimens in log *  Findings:   Impedances WNL at end of case. Improved connectivity relative to prior battery .  Complications: None.  Implants:   Implant Name Type Inv. Item Serial No.  Lot No. LRB No. Used Action   GENERATOR DBS SYSTEM INFINITY 7 IPG [id number] Neurology device GENERATOR DBS SYSTEM INFINITY 7 IPG [id number] [employer]  Left 1 Explanted   GENERATOR DBS SYSTEM LIBERTA RC IPG [id number] - [id number] Neurology device GENERATOR DBS SYSTEM LIBERTA RC IPG [id number] [employer]  Left 1 Implanted    DBS PT CONTROLLER DBS INFINITY [id number] - [id number] Neurology device  DBS PT CONTROLLER DBS INFINITY [id number] [employer]  N/A 1 Used as a Supply     Argenis Delgadillo MD, PhD  PGY-3 Neurosurgery    Please contact neurosurgery resident on call with questions.    Dial * * [phone], enter [phone] when prompted.       NEUROSURGERY ATTENDING ATTESTATION: Sidney PLAZA M.D., Ph.D., Neurosurgery Attending, was present and scrubbed for the entire case and performed the key and critical portions of the case.

## 2024-10-31 NOTE — OR NURSING
Patient took home dose of carbidopa-levodopa ER (RYTARY) 36. MG CPCR per CR capsule and rOPINIRole (REQUIP) 2 MG tablet at 1200 in preop per Dr. Cardozo request.

## 2024-11-04 ENCOUNTER — PATIENT OUTREACH (OUTPATIENT)
Dept: NEUROSURGERY | Facility: CLINIC | Age: 64
End: 2024-11-04
Payer: MEDICARE

## 2024-11-04 NOTE — PROGRESS NOTES
Neurosurgery Discharge Coordination Note     Attending physician: Dr. Cardozo  Surgery performed:  Replacement and upgrade of deep brain stimulator generator/battery over left chest wall   Date of Discharge: 10/31/24  Discharge to: Home     Current status: Patient states he is doing well since surgery. Denies pain. Denies redness, swelling, increased tenderness, drainage, incision opening or elevated temp. Denies current bowel or bladder issues.    Discharge instructions and medications reviewed with patient.  Follow up appointments/imaging/tests needed: 2 week post op with NP Lorie Tripp 11/15/24.     RN triage/on call number given: [phone]/ [phone]  Transitions of Care Outreach  Chief Complaint   Patient presents with    Clinic Care Coordination - Post Hospital       Most Recent Admission Date: 10/31/2024   Most Recent Admission Diagnosis: End of battery life of deep brain stimulator - Z45.42  Status post deep brain stimulator placement - Z96.89  Parkinson's disease, unspecified whether dyskinesia present, unspecified whether manifestations fluctuate (H) - G20.A1  Von Willebrand disease, type I (H) - D68.01     Most Recent Discharge Date: 10/31/2024   Most Recent Discharge Diagnosis: Parkinson's disease, unspecified whether dyskinesia present, unspecified whether manifestations fluctuate (H) - G20.A1  Von Willebrand disease, type I (H) - D68.01  Status post deep brain stimulator placement - Z96.89     Transitions of Care Assessment    Discharge Assessment  How are your symptoms? (Red Flag symptoms escalate to triage hotline per guidelines): Improved  Do you know how to contact your clinic care team if you have future questions or changes to your health status? : Yes  Does the patient have their discharge instructions? : Yes  Does the patient have questions regarding their discharge instructions? : No  Were you started on any new medications or were there changes to any of your previous medications? :  Yes  Does the patient have all of their medications?: Yes  Do you have questions regarding any of your medications? : Yes (see comment)  Do you have all of your needed medical supplies or equipment (DME)?  (i.e. oxygen tank, CPAP, cane, etc.): Yes    Post-op ([facility] Only)  If the patient had a surgery or procedure, do they have any questions for a nurse?: No    Post-op (Clinicians Only)  Did the patient have surgery or a procedure: Yes  Incision: closed;healing  Drainage: No  Bleeding: none  Fever: No  Chills: No  Redness: No  Warmth: No  Swelling: No  Incision site pain: No  Closure: suture;dissolving  Eating & Drinking: eating and drinking without complaints/concerns  PO Intake: regular diet  Bowel Function: normal  Urinary Status: voiding without complaint/concerns      Follow up Plan     Discharge Follow-Up  Discharge follow up appointment scheduled in alignment with recommended follow up timeframe or Transitions of Risk Category? (Low = within 30 days; Moderate= within 14 days; High= within 7 days): Yes  Discharge Follow Up Appointment Date: 11/15/24  Discharge Follow Up Appointment Scheduled with?: Specialty Care Provider    Future Appointments   Date Time Provider Department Center   11/15/2024 11:30 AM Lorie Tripp NP [facility]   12/9/2024  1:30 PM Vern Ding PA-C [facility] [place]       Outpatient Plan as outlined on AVS reviewed with patient.    For any urgent concerns, please contact our 24 hour nurse triage line: [phone] ([phone])       DYLON HUNTER RN

## 2024-11-07 ENCOUNTER — DOCUMENTATION ONLY (OUTPATIENT)
Dept: OTHER | Facility: CLINIC | Age: 64
End: 2024-11-07
Payer: MEDICARE

## 2024-11-12 NOTE — PROGRESS NOTES
[facility]  [address] [place] [id number]  Main: [phone], Fax: [phone]    Patient seen at: [facility] at [address] [place] [address]    Outpatient Visit Note:    Patient: Dion Villavicencio  MRN: [id number]  : 1960  MARY: Dec 9, 2024  Location of this writer at the time of this clinic visit was conducted: [facility].  Location of the patient at the time of this clinic visit was conducted: [facility].     Reason of today's visit:  Severe Type I Von Willebrand Disease. Annual follow up.      Clinical History Summary:  Dion Villavicencio is a 64 year old male with Parkinson's disease who also has Type I Von Willebrand Disease with his baseline factor VIII level of 30%; VWF:Ag of 15% and VWF:Acitivty of <20% (done back in 2018), returns to clinic today for his routine annual follow up visit. Both Dion's father and his son also has Type I Von Willebrand Disease.      Dion was last seen by Dr. Anish Ma back in 7/15/2019 and by this writer back in 2023.      Bleeding History Summary:  Dion's father apparently had a history of severe and frequent epistaxis that often required cautery. After his father was diagnosed with Von Willebrand Disease, he then would be treated with a typical agent such as aminocaproic acid and Stimate. Dion's son was also eventually found to have severe Von Willebrand Disease after an injury as a young child. His son apparently fell and injured his lip which then bled severely and required attention in the emergency department. Dion's son was followed by Dr. Twila Alexander, pediatric hematologist at [facility] when he was a child but has lost follow up since he turns 21. Currently he is in his 30's. Dion's son leaves in the [place] area. Dion reports that his son did use  Stimate in the past but he does not belief that a formal DDAVP trial on his son had ever been done.   Dion then eventually is also diagnosed with severe Type I Von Willebrand Disease I belief was around 2018.  Prior to his diagnosis of Von Willebrand disease, he did undergo wisdom teeth extraction without any excessive bleeding. He did have a laceration to the left side of his face and orbit in the past that required suturing and did have a large hematoma. He also had injured his left knee in the past and also recalled that he had a large hematoma. He did need cautery twice as a child for epistaxis. He never had any major joint or abdominal surgeries.   He first established care with our clinic back in 9/6/2018 and was seen by Dr. Ma after he was referred by neurosurgery for consultation in regard to bleeding disorder managemetn for deep brain stimulator placement for his Parkinson's disease.   Dion underwent deep brain stimulator placement back in 7/9/2019 for his Parkinson's disease. For that particular surgery, Dion received a dose of VONVENDI (rVWF) at 60 Units/kg the day prior to his surgery. He had as expected recovery on the day of his surgery. He also received a repeat dose of VONVENDI at 40 Units/kg a few hours after his surgery and also a repeat dose on 7/12/2019 just prior to his discharge. Dion did well without any bleeding issues. Dion also had received Humate-P in the past for teeth extraction procedures.   He then saw this writer back in Nov 2020 for bleeding disorder management of a screening colonoscopy. I recommended one dose of Humate-P at 60 RCo Units/kg 30-60 minutes prior to the procedure at the time. I also recommended Amicar x 10 days if any biopsy or polypectomy was done. He did have multiple polypectomies done during this procedure. It is unclear to me if the patient picked up his Amicar after the colonoscopy. The patient and his wife also does not remember. But regardless, he recalls  "no complications with bleeding. His next colonoscopy is in the year of 2023.    Dion had never undergone formal DDAVP trial.      Interim History:  2/19/2024, he underwent colonoscopy. This writer provided him with the VWD management plan. He received Humate-P prior to the procedure at 60 RCo units/kg, He did have polypectomy and thus he received a follow up dose of Humate-P the day after his procedure and took antifibrinolytics for 14 days after the procedure. No bleeding complications.   10/31/2024, he underwent deep brain stimulator generator/battery replacement surgery. I provided him with a treatment plan for this as well. He received Humate-P at 60 RCo units prior to the procedure as well as IV tranexamic acid at 1 gram prior to the procedure. Then he took oral tranexamic acid for 7 days post procedure. No bleeding complications.     Otherwise, Dion reports no bleeding issues in the past year. Dion and his wife gladly reports that his current deep brain stimulator is rechargeable and should last for about 10-12 years.     Dion is not anticipating any invasive or surgical procedures in this upcoming year.     ROS:  Denies any bleeding complications. Specifically, no frequent epistaxis. No issues with oral mucosal bleeding. Denies any hematuria or blood in stools. Denies any shortness of breath. No chest pain. No cough. No fever.      Medications, Allergies and PmHx:  All are reviewed by this writer today via electronic medical records    Social History:   Deferred.    Family History:  Deferred.    Objective:  Vitals: /72 (BP Location: Right arm, Patient Position: Sitting)   Pulse 53   Temp 96.9  F (36.1  C) (Tympanic)   Ht 1.727 m (5' 8\")   Wt 82.5 kg (181 lb 14.4 oz)   SpO2 100%   BMI 27.66 kg/m    Exam:   Complete exam is not performed today.    Labs:  Component      Latest Ref Rng 10/14/2024  8:54 AM   Sodium      135 - 145 mmol/L 142    Potassium      3.4 - 5.3 mmol/L 4.5    Chloride      98 - " 107 mmol/L 109 (H)    Carbon Dioxide (CO2)      22 - 29 mmol/L 25    Anion Gap      7 - 15 mmol/L 8    Urea Nitrogen      8.0 - 23.0 mg/dL 19.7    Creatinine      0.67 - 1.17 mg/dL 0.97    GFR Estimate      >60 mL/min/1.73m2 88    Calcium      8.8 - 10.4 mg/dL 9.1    Glucose      70 - 99 mg/dL 117 (H)    WBC      4.0 - 11.0 10e3/uL 5.3    RBC Count      4.40 - 5.90 10e6/uL 5.31    Hemoglobin      13.3 - 17.7 g/dL 16.3    Hematocrit      40.0 - 53.0 % 49.0    MCV      78 - 100 fL 92    MCH      26.5 - 33.0 pg 30.7    MCHC      31.5 - 36.5 g/dL 33.3    RDW      10.0 - 15.0 % 12.5    Platelet Count      150 - 450 10e3/uL 230    INR      0.85 - 1.15  0.94    PTT      22 - 38 Seconds 33       Component      Latest Ref Rng 11/12/2021  2:02 PM   Factor 8 Assay      55 - 200 % 36 (L)    von Willebrand Factor Antigen      50 - 200 % 19 (L)    von Willebrand Factor Activity      50 - 180 % <19 (LL)    Ristocetin Cofactor Activity      51 - 215 % < 10 (L)       Results for DION AWAD (MRN [id number]) as of 7/16/2019 08:14 (perioperative VWF panel with VONVENDI as described above).       Ref. Range 7/9/2019 06:10 7/9/2019 17:01 7/10/2019 06:21 7/11/2019 03:38 7/12/2019 06:52 7/15/2019 07:20   Factor 8 Assay Latest Ref Range: 55 - 200 % 87 126 138 201 (H) 175 105   von Willebrand Antigen Latest Ref Range: 50 - 200 % 109 98 167 155 132 88   von Willebrand Factor Activity Latest Ref Range: 50 - 180 % 80 57 128 106 73 35 (L)       Assessment:  Dion is a 64 year old male with severe Type I Von Willebrand Disease (VWD) with his baseline factor VIII level of 30%; VWF:Ag of 15% and VWF:Acitivty of <20%,)  who also has a history of refractory Parkinson's Disease S/P deep brain stimulator, 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      In the past year, Dion has done well without any bleeding diathesis with his daily activities.       Diagnosis:  Severe Type I Von Willebrand Disease.   Parkinson's disease S/P deep brain stimulator placement in 2019. Will eventually needs the battery replace likely in one year from now (Dec 2023).   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 He does not require any routine hematological treatments on a daily activities perspective. He will, however, requires periodic Von Willebrand factor concentrate replacement therapy with invasive or surgical procedures or with major traumatic injuries with or without the use of antifibrinolytics.    He and his wife know to contact our clinic if Dion should experience any unusual bleeding issues or if he should need any invasive or surgical procedures in the future. Otherwise, we will plan to see him back annually. Virtual or in-person visit is fine.         Vern Ding PA-C, MPAS  Physician Assistant  [facility].     The longitudinal plan of care for these conditions below were addressed during this visit. Due to the added complexity in care, I will continue to support Dion Villavicencio  in the subsequent management of these conditions and with the ongoing continuity of care of these conditions.    Problem List Items Addressed This Visit as of 2/19/2024   Severe Type I Von Willebrand Disease.   Parkinson's disease S/P deep brain stimulator placement in 2019. Will eventually needs the battery replace likely in one year from now (Dec 2023).   History of Burden Syndrome requiring screening colonoscopy every 3 years (last was done back on 2/19/2024)    20 minutes spent by me on the date of the encounter doing chart review, history and exam, documentation and further activities per the note    Time IN: 13:30  Time OUT: 13:45

## 2024-11-12 NOTE — OP NOTE
PATIENT NAME: JOSE AWAD  YOB: 1960  MRN:   [id number]  ACCOUNT:  [id number]      DATE OF PROCEDURE:  10/31/2024    PREOPERATIVE DIAGNOSIS:  1.  Parkinson s disease, unspecified whether dyskinesia present, unspecified whether manifestations fluctuate  2.  Bilateral tremor  3.  Von Willebrand s disease    4.  Status post bilateral deep brain stimulator placement, phase I, placement of bilateral deep brain stimulator electrodes, target bilateral subthalamic nucleus, with microelectrode recording on 7/9/2019  5.  Status post deep brain stimulator placement, phase II, placement of deep brain stimulator generator/battery over the left chest wall on 7/11/2019  6.  Depleted DBS battery/generator over the left chest wall    POSTOPERATIVE DIAGNOSIS:  1.  Parkinson s disease, unspecified whether dyskinesia present, unspecified whether manifestations fluctuate  2.  Bilateral tremor  3.  Von Willebrand s disease    4.  Status post bilateral deep brain stimulator placement, phase I, placement of bilateral deep brain stimulator electrodes, target bilateral subthalamic nucleus, with microelectrode recording on 7/9/2019  5.  Status post deep brain stimulator placement, phase II, placement of deep brain stimulator generator/battery over the left chest wall on 7/11/2019  6.  Depleted DBS battery/generator over the left chest wall    PROCEDURES PERFORMED:  1.  Replacement and upgrade of deep brain stimulator generator/battery, model Infinity 7 to model Liberta RC, over the left chest wall with connection to two electrode arrays.  2.  Revision of the left chest wall generator/battery pocket.  3.  Electrical interrogation and analysis of the left side deep brain stimulator system.    ATTENDING SURGEON:  Sidney Cardozo MD, PhD.    ASSISTANT SURGEON:  Argenis Delgadillo MD, PhD.    ANESTHESIA:  Monitored anesthesia care and local anesthetic.    ESTIMATED BLOOD LOSS:  1 mL    COMPLICATIONS:  None    FINDINGS:  No sign of  infection.  No sign of hardware breakage or damage.  With new generator/battery connected, all the impedance values were within normal.  No problems were found.    EXPLANTS:  Old Abbott DBS generator/battery, Infinity 7, REF [id number], S/N [id number]    IMPLANTS:  New Abbott DBS generator/battery, Bree RC, REF [id number], S/N [id number]    INDICATIONS FOR PROCEDURE:  Mr. Dion Villavicencio is a 64 year old right handed male with a diagnosis of Parkinson's disease and he is status post bilateral deep brain stimulator placement, phase I, placement of bilateral deep brain stimulator electrodes, target bilateral subthalamic nucleus, with microelectrode recording on 7/9/2019 and status post deep brain stimulator placement, phase II, placement of deep brain stimulator generator/battery over the left chest wall on 7/11/2019.  His case was unique in that he has a diagnosis of von Willebrand's disease which required special perioperative management of his bleeding disorder.  He did well and there were no postoperative complications.  Patient states that beginning 10/1/2024, he started to get the message in his patient  that he needed to have the generator/battery replaced.  It has been more than 5 years since his implantation.  He states that he has been doing well and he is getting good benefit from the DBS therapy.  He denies any issues with his device.  There is no known open or short circuit.  He denies any abnormal shocks or undesirable stimulation.  He denies any issues with the location of his generator/battery.  Briefly, he is a 64-year-old right-handed male with Parkinson's disease that was diagnosed in 2006.  In summary, he had a right hand tremor which was worse with typing.  He noticed that his tremors were worse which had spread to both feet and his left hand.  He had difficulty with eating and buttoning his clothes, but he could still complete these tasks without assistance.  He also reported being unsteady when  "standing with his eyes closed.  No falls had been reported.  He felt that his social life was limited due to having to coordinate his medications and meals.  His goal for DBS was to control his shaking in his hands and both of his feet.  Additionally, he would like to live a more \"normal life\" while using less medication.  In terms of his von Willebrand's disease, he did report that he bruises easily and once when he needed stitches for his face, his entire face was bruised due to bleeding.  He was seen and evaluated by Dr. Ma of Hematology at [facility].  The plan was for the patient to be given transfusion to achieve factor level goals in order to reduce his surgical bleeding risk to a reasonable level, similar to those undergoing the surgery without the von Willebrand's disease.  He completed his DBS workup and his case was discussed at the Movement Disorder Consensus Group Meeting.  He was considered to be a good candidate in terms of DBS, bilateral candidate using Abbott system.  We discussed the option of a rechargeable generator/battery, Liberta RC.  Although his generator/battery is lasting him 5 years, he does have von Willebrand's disease which increases his risk of bleeding each time he has surgical intervention.  Given that he would reduce the number of generator/battery replacement surgeries with a rechargeable system, it would be best to consider a rechargeable generator/battery.  Patient and his wife are in agreement.  We did briefly discuss the advantages and disadvantages of a rechargeable generator/battery.  The rechargeable generator/battery is smaller and would last at least 12 to 15 years.  However, he would have to keep it maintained/charged.  Patient and wife feel that this would be manageable and that in itself is worth the reduction in the total number of surgeries going forward.  During today's visit, we discussed the surgical procedure for replacing the DBS generator/battery over the left " chest wall.  He currently has the Abbott Infinity 7 generator/battery, and this will be upgraded to Pratt Liberta RC, rechargeable generator/battery, during the upcoming procedure.  Risks, benefits, alternative therapies were discussed with the patient, including but not limited to infection and bleeding.  This surgical procedure will be performed under MAC with local anesthetic.  The thinned part of the wound/pocket may be corrected with mobilization of the tissue under the incision.  The pocket may need to be enlarged to minimize the stress on the closure.  However, since the rechargeable generator/battery is much smaller, this may not be needed.  Surgical procedure was discussed in detail.  All questions were answered, and he expressed understanding.  As for the perioperative management of the von Willebrand's disease, the following was recommended by Dr. Riley and Vern Ding PA-C.  Infuse Humate-P at 60 RCo Units/kg IV bolus at 4 mL/min just 30-60 minutes prior to his surgical procedure.  Order is placed in [facility] on 10/14/2024 to give this dose at pre-surgery anesthesia unit on the day of procedure.  Then, Infuse IV tranexamic acid at 1 gram over 10 min starting 30 minutes prior to surgery.  He can continue this during intra-operatively.  Order for this is placed by this writer in EPIC on 10/14/2024.  Then, take oral tranexamic acid at 1300 mg PO BID x 7 days post procedure.  Prescription placed and sent to our clinic's pharmacy on 10/14/2024.     DESCRIPTION OF PROCEDURE:  In the preoperative area, he was infused with Humate-P at 60 Rco Units/kg IV bouls at 4 mL/min about 30 to 60 minutes prior to the surgery.  Informed consent was obtained from the patient and his left chest was marked, specifically the previous incision.  He was brought to the operating theater and was laid in a supine position.  His left arm was tucked.  All pressure points were padded appropriately.  Monitored anesthesia care was induced  and maintained throughout the entire case.  His scar and previous incision over the left chest wall was prepped and draped in the usual sterile fashion.  He was also given an infusion of IV tranexamic acid at 1 gram over 10 minutes starting about 30 minutes prior to the surgery.  Time out was performed confirming the patient's identity, procedure to be performed, site and side of the surgery and the administration of prophylactic preoperative antibiotic.  10 mL of Lidocaine 1% with 1:100,000 epinephrine mixed with Marcaine 1/4% plain, 50:50 mix, was injected in the subcutaneous space at the intended incision site, which was the previously well healed incision.  Both DBS system was placed on surgery safe mode.    We turned our attention to the left side.  We used a #10 blade scalpel to make the skin incision, going over the previously well healed scar.  We carried the dissection through the dermal layer until we reached the subcutaneous fat and then the generator/battery capsule.  The monopolar cautery was used to dissect the subcutaneous tissue and #10 blade to open the capsule, taking great care not to damage the hardware.  We gently opened up the fibrous capsule surrounding the generator/battery to expose it.  Care was taken to open the capsule while lifting the capsule itself away from the generator/battery, taking great care not to come in contact with the metal casing or the wire.  The generator/battery was mobile and we were able to remove it easily, taking care to attend to the location of the wire.  The scar surrounding and anchoring the extension wire was carefully dissected and wire freed.  The extension wire was examined and was found to be without any damage or erosion or defect.  There were no obvious signs of infection.    Since the Liberta RC generator/battery is much smaller than the Infinity 7 generator/battery, there was no need for extensive pocket enlargement.  The side opening into the pocket  was enlarged.  The entire cavity was copiously irrigated with normal saline and meticulous hemostasis was obtained and the pocket was dried.  However, in order to minimize the dead space after the new generator/battery placement, the pocket was closed at the inferior aspect by placing two 3-0 Vicryl sutures to reapproximate the anterior wall of the pocket with the posterior wall of the pocket, thus minimizing the inferior pocket dead space.        The old generator/battery, Infinity 7, was disconnected from the extension wires, one at a time to maintain the correct orientation.  We used a special wire isidro tool to do this.  The connector contacts were cleaned.  The new generator/battery, Pratt Liberta RC, was connected to the extension wires in the following sequence with the extension wires being inserted into the following corresponding portals.  The extension wire from the anterior portal of the Infinity 7 generator/battery was inserted into the inferior portal of the Liberta RC.  The extension wire from the posterior portal of the Infinity 7 generator/battery was inserted into the superior portal of the Liberta RC.  Therefore, two electrode arrays, both right and left were connected to generator/battery and secured.    And then the new generator/battery was placed back into the pocket along with the excess extension wire being placed posteriorly and around the periphery of the generator/battery.  The generator/battery was secured to the new position within the pocket using two 2-0 Ethibond sutures.    The left and right side DBS system was tested and interrogated electrically and was found to be working well and impedance values were noted to be within normal.  No problems were found.      With the satisfactory placement of the new system, we began closing the wound.  The deep pocket or capsule was reapproximated using 3-0 Vicryl sutures in an simple interrupted fashion.  The subcutaneous fat layer was then  reapproximated using 3-0 Vicryl sutures in an inverted interrupted fashion to provide padding to the skin for the closure.  The dermal layer was reapproximated using 3-0 Vicryl sutures in an inverted interrupted fashion.  The skin was reapproximated using 4-0 Monocryl suture in a subcuticular fashion.  The incision was then cleaned with wet and dry sponges followed by ChloraPrep and then Dermabond skin glue was applied.    The bilateral DBS system was turned back on to the therapeutic mode.    At the end of the case, all counts including needle, sponge and instrument counts were correct x 2.  There were no complications.  Patient was awakened and taken to the recovery room in a stable condition.     I, Adam Cardozo M.D., Ph.D., Neurosurgery Attending, was present and scrubbed for the entire case and performed the key and critical portions of the case.      ADAM CARDOZO MD, PHD

## 2024-11-15 ENCOUNTER — OFFICE VISIT (OUTPATIENT)
Dept: NEUROSURGERY | Facility: CLINIC | Age: 64
End: 2024-11-15
Payer: MEDICARE

## 2024-11-15 VITALS — SYSTOLIC BLOOD PRESSURE: 107 MMHG | OXYGEN SATURATION: 96 % | HEART RATE: 77 BPM | DIASTOLIC BLOOD PRESSURE: 72 MMHG

## 2024-11-15 DIAGNOSIS — Z96.89 STATUS POST DEEP BRAIN STIMULATOR PLACEMENT: Primary | ICD-10-CM

## 2024-11-15 NOTE — NURSING NOTE
Chief Complaint   Patient presents with    Surgical Followup     2 week post-op follow up     Sumit Viramontes

## 2024-11-15 NOTE — PROGRESS NOTES
Neurosurgery Clinic    Thank you for referring Dion Villavicencio to the neurosurgery clinic at the [facility] [place].   I had the opportunity to meet with Dion Villavicencio on November 15, 2024.    As you know, Dion is a 64 year old male who presents today after replacement and upgrade of deep brain stimulator generator/battery replacement on 10/31/2024. He notes overall he is doing well. He denies any pain. He notes he has healed well and glue has fallen off.     PREOPERATIVE DIAGNOSIS:  1.  Parkinson s disease, unspecified whether dyskinesia present, unspecified whether manifestations fluctuate  2.  Bilateral tremor  3.  Von Willebrand s disease    4.  Status post bilateral deep brain stimulator placement, phase I, placement of bilateral deep brain stimulator electrodes, target bilateral subthalamic nucleus, with microelectrode recording on 7/9/2019  5.  Status post deep brain stimulator placement, phase II, placement of deep brain stimulator generator/battery over the left chest wall on 7/11/2019  6.  Depleted DBS battery/generator over the left chest wall     POSTOPERATIVE DIAGNOSIS:  1.  Parkinson s disease, unspecified whether dyskinesia present, unspecified whether manifestations fluctuate  2.  Bilateral tremor  3.  Von Willebrand s disease    4.  Status post bilateral deep brain stimulator placement, phase I, placement of bilateral deep brain stimulator electrodes, target bilateral subthalamic nucleus, with microelectrode recording on 7/9/2019  5.  Status post deep brain stimulator placement, phase II, placement of deep brain stimulator generator/battery over the left chest wall on 7/11/2019  6.  Depleted DBS battery/generator over the left chest wall     PROCEDURES PERFORMED:  1.  Replacement and upgrade of deep brain stimulator generator/battery, model Infinity 7 to model Liberta RC, over the left chest wall with connection to two electrode arrays.  2.  Revision of the left  chest wall generator/battery pocket.  3.  Electrical interrogation and analysis of the left side deep brain stimulator system.    Past Medical History:   Diagnosis Date    Encounter for neuropsychological testing 9/16/2018    IMPRESSIONS AND RECOMMENDATIONS   Current results indicate impairments in learning, which are somewhat variable, and memory that falls well within normal limits. Language, visual processing, complex attentional processing, and executive functioning fall within normal limits, generally in the above average range. He denied experiencing significant depressive symptomatology, apathy, or anxiety.   Th    Esophageal reflux 7/29/2018    H/O magnetic resonance imaging of brain and brain stem 8/31/2018    MR BRAIN W/O & W CONTRAST 8/31/2018 2:30 PM  History: ; Parkinson's disease (H) ICD-10: Parkinson's disease (H)  Comparison:  3/14/2006    Technique: Midline sagittal T1-weighted images and axial diffusion with images through the whole brain were obtained. Axial and coronal T2-weighted images, and inversion recovery images were obtained with focus on the evans through the corpus callosum, obtained     Hx of von Willebrand's disease 7/29/2018    Parkinson disease (H) 7/29/2018       Past Surgical History:   Procedure Laterality Date    IMPLANT DEEP BRAIN STIMULATION GENERATOR / BATTERY Left 7/11/2019    Procedure: Deep Brain Stimulator Placement, Phase II, Placement Of Deep Brain Stimulator Generator/Battery Over The Left Chest Wall;  Surgeon: Sidney Cardozo MD;  Location: [facility] OR    OPTICAL TRACKING SYSTEM INSERTION DEEP BRAIN STIMULATION BILATERAL Bilateral 7/9/2019    Procedure: Stealth Assisted Bilateral Deep Brain Stimulator Placement, Phase I, Placement Of Bilateral Deep Brain Stimulator Electrode, Target Bilateral Subthalamic Nucleus With Microelectrode Recording;  Surgeon: Sidney Cardozo MD;  Location: [facility] OR    REPLACE DEEP BRAIN STIMULATION GENERATOR / BATTERY Left 10/31/2024     Procedure: Replacement and upgrade of deep brain stimulator generator/battery over left chest wall;  Surgeon: Sidney Cardozo MD;  Location:  OR    [facility]  [facility]    wisdom teeth surgery       ALLERGIES:  Penicillins and Pramipexole    Current Outpatient Medications   Medication Sig Dispense Refill    carbidopa-levodopa (SINEMET)  MG tablet Take Sinemet 25/100 mg by mouth 1 tablet as needed for leg/foot cramping.  May take 2 tabs PRN/day. 180 tablet 3    carbidopa-levodopa ER (RYTARY) 36. MG CPCR per CR capsule Take 2 capsules by mouth 5 times daily 2 capsules @ 7-830 am, 11am-1230pm, 3-430pm; 7-830 pm and 11pm-1230am 900 capsule 1    celecoxib (CELEBREX) 200 MG capsule TAKE 1 CAPSULE DAILY 30 capsule 5    Cholecalciferol (VITAMIN D) 2000 units tablet Take 2,000 Units by mouth every morning 2000 units by mouth daily @ 7-830am 100 tablet 3    mirabegron (MYRBETRIQ) 50 MG 24 hr tablet Take 50 mg by mouth daily.      Multiple Vitamins-Minerals (CENTRUM SILVER) per tablet Take 1 tablet by mouth every morning Centrum vitamin by mouth daily @ 7-830am 30 tablet     omeprazole (PRILOSEC) 20 MG DR capsule Take 20 mg by mouth daily.      rOPINIRole (REQUIP) 2 MG tablet Take 1 tablet (2 mg) by mouth 3 times daily 2mg tab by mouth @ 7-830am, 3-430pm and 11pm-1230am 270 tablet 3    tadalafil (CIALIS) 5 MG tablet Take 1 tablet by mouth daily at 2 pm.      tranexamic acid (LYSTEDA) 650 MG tablet Take 2 tablets (1,300 mg) by mouth See Admin Instructions. Take 2 tablets (1300 mg) PO Q 12 hours for 7 days post operatively. 28 tablet 0       Family History   Problem Relation Age of Onset    Eye Disorder Mother         dry eyes ? glaucoma    Diabetes Father     Blood Disease Father         von willendbrand    Skin Cancer Father     Nephrolithiasis Brother     Nephrolithiasis Brother     Blood Disease Son         chung baum       Social History     Tobacco Use    Smoking status: Never     Smokeless tobacco: Never   Substance Use Topics    Alcohol use: No         PHYSICAL EXAM:   /72 (BP Location: Left arm, Patient Position: Sitting, Cuff Size: Adult Regular)   Pulse 77   SpO2 96%     Alert and oriented to person, place, and time. No acute distress.   PERRL, EOMI. Face symmetric. Tongue midline.   No pronator drift.   Chest wall incision well healed, no redness, swelling or drainage noted.    ASSESSMENT:  Dion Villavicencio, 64 year old male with Parkinsons disease with DBS with recent generator/battery replacement    PLAN:  - we can see Dion on an as needed basis  - Dion Villavicencio and family were counseled to please contact us with any acute worsening of symptoms, or with any questions or concerns.       Lorie Tripp NP  Department of Neurosurgery  [phone]    This patient was discussed with neurosurgery faculty, who agrees with the above.  Lorie Tripp NP on 11/15/2024 at 11:58 AM

## 2024-11-15 NOTE — LETTER
11/15/2024       RE: Dion Villavicencio  [address] [address]     Dear Colleague,    Thank you for referring your patient, Dion Villavicencio, to the [facility] NEUROSURGERY CLINIC [place] at [facility]. Please see a copy of my visit note below.           Neurosurgery Clinic    Thank you for referring Dion Villavicencio to the neurosurgery clinic at the [facility] [place].   I had the opportunity to meet with Dion Villavicencio on November 15, 2024.    As you know, Dion is a 64 year old male who presents today after replacement and upgrade of deep brain stimulator generator/battery replacement on 10/31/2024. He notes overall he is doing well. He denies any pain. He notes he has healed well and glue has fallen off.     PREOPERATIVE DIAGNOSIS:  1.  Parkinson s disease, unspecified whether dyskinesia present, unspecified whether manifestations fluctuate  2.  Bilateral tremor  3.  Von Willebrand s disease    4.  Status post bilateral deep brain stimulator placement, phase I, placement of bilateral deep brain stimulator electrodes, target bilateral subthalamic nucleus, with microelectrode recording on 7/9/2019  5.  Status post deep brain stimulator placement, phase II, placement of deep brain stimulator generator/battery over the left chest wall on 7/11/2019  6.  Depleted DBS battery/generator over the left chest wall     POSTOPERATIVE DIAGNOSIS:  1.  Parkinson s disease, unspecified whether dyskinesia present, unspecified whether manifestations fluctuate  2.  Bilateral tremor  3.  Von Willebrand s disease    4.  Status post bilateral deep brain stimulator placement, phase I, placement of bilateral deep brain stimulator electrodes, target bilateral subthalamic nucleus, with microelectrode recording on 7/9/2019  5.  Status post deep brain stimulator placement, phase II, placement of deep brain stimulator generator/battery over the left chest  wall on 7/11/2019  6.  Depleted DBS battery/generator over the left chest wall     PROCEDURES PERFORMED:  1.  Replacement and upgrade of deep brain stimulator generator/battery, model Infinity 7 to model Liberta RC, over the left chest wall with connection to two electrode arrays.  2.  Revision of the left chest wall generator/battery pocket.  3.  Electrical interrogation and analysis of the left side deep brain stimulator system.    Past Medical History:   Diagnosis Date     Encounter for neuropsychological testing 9/16/2018    IMPRESSIONS AND RECOMMENDATIONS   Current results indicate impairments in learning, which are somewhat variable, and memory that falls well within normal limits. Language, visual processing, complex attentional processing, and executive functioning fall within normal limits, generally in the above average range. He denied experiencing significant depressive symptomatology, apathy, or anxiety.   Th     Esophageal reflux 7/29/2018     H/O magnetic resonance imaging of brain and brain stem 8/31/2018    MR BRAIN W/O & W CONTRAST 8/31/2018 2:30 PM  History: ; Parkinson's disease (H) ICD-10: Parkinson's disease (H)  Comparison:  3/14/2006    Technique: Midline sagittal T1-weighted images and axial diffusion with images through the whole brain were obtained. Axial and coronal T2-weighted images, and inversion recovery images were obtained with focus on the evans through the corpus callosum, obtained      Hx of von Willebrand's disease 7/29/2018     Parkinson disease (H) 7/29/2018       Past Surgical History:   Procedure Laterality Date     IMPLANT DEEP BRAIN STIMULATION GENERATOR / BATTERY Left 7/11/2019    Procedure: Deep Brain Stimulator Placement, Phase II, Placement Of Deep Brain Stimulator Generator/Battery Over The Left Chest Wall;  Surgeon: Sidney Cardozo MD;  Location: [facility] OR     OPTICAL TRACKING SYSTEM INSERTION DEEP BRAIN STIMULATION BILATERAL Bilateral 7/9/2019    Procedure:  Stealth Assisted Bilateral Deep Brain Stimulator Placement, Phase I, Placement Of Bilateral Deep Brain Stimulator Electrode, Target Bilateral Subthalamic Nucleus With Microelectrode Recording;  Surgeon: Sidney Cardozo MD;  Location: [facility] OR     REPLACE DEEP BRAIN STIMULATION GENERATOR / BATTERY Left 10/31/2024    Procedure: Replacement and upgrade of deep brain stimulator generator/battery over left chest wall;  Surgeon: Sidney Cardozo MD;  Location:  OR     [email or web address] ORAL SURGERY PROCEDURE  1986    wisdom teeth surgery       ALLERGIES:  Penicillins and Pramipexole    Current Outpatient Medications   Medication Sig Dispense Refill     carbidopa-levodopa (SINEMET)  MG tablet Take Sinemet 25/100 mg by mouth 1 tablet as needed for leg/foot cramping.  May take 2 tabs PRN/day. 180 tablet 3     carbidopa-levodopa ER (RYTARY) 36. MG CPCR per CR capsule Take 2 capsules by mouth 5 times daily 2 capsules @ 7-830 am, 11am-1230pm, 3-430pm; 7-830 pm and 11pm-1230am 900 capsule 1     celecoxib (CELEBREX) 200 MG capsule TAKE 1 CAPSULE DAILY 30 capsule 5     Cholecalciferol (VITAMIN D) 2000 units tablet Take 2,000 Units by mouth every morning 2000 units by mouth daily @ 7-830am 100 tablet 3     mirabegron (MYRBETRIQ) 50 MG 24 hr tablet Take 50 mg by mouth daily.       Multiple Vitamins-Minerals (CENTRUM SILVER) per tablet Take 1 tablet by mouth every morning Centrum vitamin by mouth daily @ 7-830am 30 tablet      omeprazole (PRILOSEC) 20 MG DR capsule Take 20 mg by mouth daily.       rOPINIRole (REQUIP) 2 MG tablet Take 1 tablet (2 mg) by mouth 3 times daily 2mg tab by mouth @ 7-830am, 3-430pm and 11pm-1230am 270 tablet 3     tadalafil (CIALIS) 5 MG tablet Take 1 tablet by mouth daily at 2 pm.       tranexamic acid (LYSTEDA) 650 MG tablet Take 2 tablets (1,300 mg) by mouth See Admin Instructions. Take 2 tablets (1300 mg) PO Q 12 hours for 7 days post operatively. 28 tablet 0       Family History   Problem  Relation Age of Onset     Eye Disorder Mother         dry eyes ? glaucoma     Diabetes Father      Blood Disease Father         von willendbrand     Skin Cancer Father      Nephrolithiasis Brother      Nephrolithiasis Brother      Blood Disease Son         chung baum       Social History     Tobacco Use     Smoking status: Never     Smokeless tobacco: Never   Substance Use Topics     Alcohol use: No         PHYSICAL EXAM:   /72 (BP Location: Left arm, Patient Position: Sitting, Cuff Size: Adult Regular)   Pulse 77   SpO2 96%     Alert and oriented to person, place, and time. No acute distress.   PERRL, EOMI. Face symmetric. Tongue midline.   No pronator drift.   Chest wall incision well healed, no redness, swelling or drainage noted.    ASSESSMENT:  Dion Villavicencio, 64 year old male with Parkinsons disease with DBS with recent generator/battery replacement    PLAN:  - we can see Dion on an as needed basis  - Dion Villavicencio and family were counseled to please contact us with any acute worsening of symptoms, or with any questions or concerns.       Lorie Tripp NP  Department of Neurosurgery  [phone]    This patient was discussed with neurosurgery faculty, who agrees with the above.  Lorie Tripp NP on 11/15/2024 at 11:58 AM      Again, thank you for allowing me to participate in the care of your patient.      Sincerely,    Lorie Tripp NP

## 2024-12-09 ENCOUNTER — OFFICE VISIT (OUTPATIENT)
Dept: HEMATOLOGY | Facility: CLINIC | Age: 64
End: 2024-12-09
Attending: PHYSICIAN ASSISTANT
Payer: MEDICARE

## 2024-12-09 VITALS
HEART RATE: 53 BPM | TEMPERATURE: 96.9 F | BODY MASS INDEX: 27.57 KG/M2 | SYSTOLIC BLOOD PRESSURE: 108 MMHG | OXYGEN SATURATION: 100 % | HEIGHT: 68 IN | WEIGHT: 181.9 LBS | DIASTOLIC BLOOD PRESSURE: 72 MMHG

## 2024-12-09 DIAGNOSIS — D68.00 VON WILLEBRAND'S DISEASE (H): ICD-10-CM

## 2024-12-09 DIAGNOSIS — Z15.09 LYNCH SYNDROME: ICD-10-CM

## 2024-12-09 DIAGNOSIS — G20.B1 PARKINSON'S DISEASE WITH DYSKINESIA, UNSPECIFIED WHETHER MANIFESTATIONS FLUCTUATE (H): Primary | ICD-10-CM

## 2024-12-09 PROCEDURE — G0463 HOSPITAL OUTPT CLINIC VISIT: HCPCS | Performed by: PHYSICIAN ASSISTANT

## 2024-12-09 PROCEDURE — 99213 OFFICE O/P EST LOW 20 MIN: CPT | Performed by: PHYSICIAN ASSISTANT

## 2024-12-09 PROCEDURE — G2211 COMPLEX E/M VISIT ADD ON: HCPCS | Performed by: PHYSICIAN ASSISTANT

## 2024-12-29 ENCOUNTER — HEALTH MAINTENANCE LETTER (OUTPATIENT)
Age: 64
End: 2024-12-29

## 2025-02-10 ENCOUNTER — TELEPHONE (OUTPATIENT)
Dept: HEMATOLOGY | Facility: CLINIC | Age: 65
End: 2025-02-10
Payer: MEDICARE

## 2025-02-10 ENCOUNTER — DOCUMENTATION ONLY (OUTPATIENT)
Dept: HEMATOLOGY | Facility: CLINIC | Age: 65
End: 2025-02-10
Payer: MEDICARE

## 2025-02-10 DIAGNOSIS — Z15.09 LYNCH SYNDROME: ICD-10-CM

## 2025-02-10 DIAGNOSIS — G20.B1 PARKINSON'S DISEASE WITH DYSKINESIA, UNSPECIFIED WHETHER MANIFESTATIONS FLUCTUATE (H): ICD-10-CM

## 2025-02-10 DIAGNOSIS — D68.00 VON WILLEBRAND'S DISEASE (H): Primary | ICD-10-CM

## 2025-02-10 NOTE — PROGRESS NOTES
[facility]  Center for Bleeding and Clotting Disorders  [facility] [address], [address], [place] [address]  Main: [phone], Fax: [phone]    Bleeding disorder management for his upcoming urological dynamic testing on 2025.    Patient: Dion Villavicencio  MRN: [id number]  : 1960  Date of this note written: February 10, 2025    Dion called our clinic reporting that he is scheduled to undergo urological dynamic testing on 2025 and inquire about the need of Von Willebrand Disease treatment for this procedure.     Dion has a history of moderate-severe Type I Von Willebrand Disease with his baseline VWF:Ag of 15%; VWF:Activities of <20% and factor VIII level of 30%.     Von Willebrand Disease treatment plan for his upcoming urological dynamic testing on 2025 is as follow:  Infuse Humate-P at 60 Units/kg IV bolus at 4 mL/min just  minutes prior to his procedure. Order placed and we will arranged for him to get the infusion at our clinic prior to his procedure.   Tranexamic acid or aminocaproic acid use is contraindicated in urological type bleeding.       Vern Ding PA-C, MPAS  Physician Assistant  [facility].

## 2025-02-10 NOTE — TELEPHONE ENCOUNTER
Called and spoke with Dion and his wife Carmen.  Dion is scheduled for urodynamic testing 2/14 at 2:15 in the afternoon in [place].  He will come to [facility] for 1:00 Humate-P infusion.  Reviewed that antifibrinolytic's are contraindicated for this procedure.    Greg WILKINS RN  [facility]  Office: [phone]  Clinic: [phone]  Fax: [phone]

## 2025-02-10 NOTE — TELEPHONE ENCOUNTER
[id number]  Dion Villavicencio  64 year old male  [facility] Diagnosis: severe type 1 vW  [facility] Provider: Vern Ding PA-C    Message left on the nurseline from Dion's wife Carmen with questions about a plan for an upcoming procedure.    Dion will be undergoing a urodynamic testing on Friday 2/14/25 outside of [place]. They believe that there is some risk of bleeding with this procedure and are wondering what Vern thinks they should do.    Will route to team.    Roberta OGDENN, RN, PHN   [facility]   Office: [phone]  Fax: [phone]

## 2025-02-14 ENCOUNTER — ALLIED HEALTH/NURSE VISIT (OUTPATIENT)
Dept: HEMATOLOGY | Facility: CLINIC | Age: 65
End: 2025-02-14
Attending: PHYSICIAN ASSISTANT
Payer: MEDICARE

## 2025-02-14 VITALS
WEIGHT: 184.9 LBS | DIASTOLIC BLOOD PRESSURE: 66 MMHG | OXYGEN SATURATION: 99 % | HEART RATE: 93 BPM | BODY MASS INDEX: 28.11 KG/M2 | SYSTOLIC BLOOD PRESSURE: 105 MMHG | TEMPERATURE: 97.3 F

## 2025-02-14 DIAGNOSIS — D68.00 VON WILLEBRAND'S DISEASE (H): Primary | ICD-10-CM

## 2025-02-14 PROCEDURE — 96374 THER/PROPH/DIAG INJ IV PUSH: CPT

## 2025-02-14 PROCEDURE — 250N000015 HC RX FACTOR IP MED 636 OP 636: Mod: JZ | Performed by: PHYSICIAN ASSISTANT

## 2025-02-14 RX ADMIN — ANTIHEMOPHILIC FACTOR/VON WILLEBRAND FACTOR COMPLEX (HUMAN) 5080 VWF UNIT: KIT at 13:24

## 2025-02-14 NOTE — PROGRESS NOTES
[id number]  Dion Villavicencio  64 year old male  [facility] Diagnosis: Moderate-severe Type 1 vWD  [facility] Provider: Timur    Dion presents to clinic today for infusion of humate-p prior to urological dynamic testing. HE received 60 units/kg iv bolus at a rate of 4mL/min in his right forearm vein. He tolerated the infusion without incident. He will call with any further questions, comments, or concerns.    Becky Sanchez  MSN, RN, N  [facility]  Office: [phone]  Fax: [phone]

## 2025-06-02 DIAGNOSIS — D68.01 VON WILLEBRAND DISEASE, TYPE I (H): ICD-10-CM

## 2025-06-02 DIAGNOSIS — G20.A1 PARKINSON'S DISEASE (H): ICD-10-CM

## 2025-06-02 RX ORDER — CELECOXIB 200 MG/1
200 CAPSULE ORAL DAILY
Qty: 30 CAPSULE | Refills: 11 | Status: SHIPPED | OUTPATIENT
Start: 2025-06-02

## (undated) DEVICE — COVER CAMERA VIDEO LASER 9X96" 04-CC227

## (undated) DEVICE — SPONGE RAY-TEC 4X8" 7318

## (undated) DEVICE — Device

## (undated) DEVICE — ADH FLOSEAL W/HUMAN THROMBIN 5ML W/APPLICATOR TIP ADS201844

## (undated) DEVICE — PREP SKIN SCRUB TRAY 4461A

## (undated) DEVICE — CATH TRAY FOLEY SURESTEP 16FR W/TMP PRB STLK LATEX A319416AM

## (undated) DEVICE — LABEL MEDICATION SYSTEM 3303-P

## (undated) DEVICE — SYR 10ML FINGER CONTROL W/O NDL 309695

## (undated) DEVICE — PREP CHLORAPREP 26ML TINTED HI-LITE ORANGE 930815

## (undated) DEVICE — DRAPE IOBAN INCISE 23X17" 6650EZ

## (undated) DEVICE — SU ETHIBOND 2-0 SHDA 30" X563H

## (undated) DEVICE — ESU CORD BIPOLAR GREEN 10-4000

## (undated) DEVICE — PACK GOWN 3/PK DISP XL SBA32GPFCB

## (undated) DEVICE — LINEN TOWEL PACK X30 5481

## (undated) DEVICE — SU VICRYL 3-0 SH 8X18" UND J864D

## (undated) DEVICE — GLOVE BIOGEL PI MICRO INDICATOR UNDERGLOVE SZ 8.0 48980

## (undated) DEVICE — PREP POVIDONE-IODINE 7.5% SCRUB 4OZ BOTTLE MDS093945

## (undated) DEVICE — HEADRING POINTS STEREOTACTIC DHRSL

## (undated) DEVICE — PREP CHLORAPREP 26ML TINTED ORANGE  260815

## (undated) DEVICE — ELEC MICROPHONICS-FREE ALPHA OMEGA STR-009080-00

## (undated) DEVICE — BLADE CLIPPER SGL USE 9680

## (undated) DEVICE — SU VICRYL 0 CT-1 36" J346H

## (undated) DEVICE — PREP CHLORAPREP CLEAR 3ML 260400

## (undated) DEVICE — PACK SET-UP STD 9102

## (undated) DEVICE — PATIENT MANUAL WITH VIRTUAL CLINIC

## (undated) DEVICE — PREP CHLORAPREP CLEAR 3ML 930400

## (undated) DEVICE — SU MONOCRYL 4-0 PS-2 18" UND Y496G

## (undated) DEVICE — SPONGE COTTONOID 1/2X1/2" 20-04S

## (undated) DEVICE — PACK NEURO MINOR UMMC SNE32MNMU4

## (undated) DEVICE — SOL NACL 0.9% IRRIG 1000ML BOTTLE 2F7124

## (undated) DEVICE — ESU CORD BIPOLAR AND IRR TUBING AESCULAP US355

## (undated) DEVICE — SU MONOCRYL 4-0 PS-2 27" UND Y426H

## (undated) DEVICE — SOL WATER IRRIG 1000ML BOTTLE 2F7114

## (undated) DEVICE — PATIENT CONTROLLER DBS

## (undated) DEVICE — ESU ELEC BLADE 2.75" COATED/INSULATED E1455

## (undated) DEVICE — ESU PENCIL W/ROCKER SWITCH BLADE HOLSTER E2350HDB

## (undated) DEVICE — GLOVE BIOGEL PI MICRO SZ 7.5 48575

## (undated) DEVICE — LINEN TOWEL PACK X6 WHITE 5487

## (undated) DEVICE — SUCTION MANIFOLD DORNOCH ULTRA CART UL-CL500

## (undated) DEVICE — PREP POVIDONE-IODINE 10% SOLUTION 4OZ BOTTLE MDS093944

## (undated) DEVICE — DRAPE SHEET REV FOLD 3/4 9349

## (undated) DEVICE — DRSG PRIMAPORE 02X3" 7133

## (undated) DEVICE — SPONGE COTTONOID 1/4X1/4" 20-01S

## (undated) DEVICE — SU DERMABOND ADVANCED .7ML DNX12

## (undated) DEVICE — SPONGE SURGIFOAM 100 1974

## (undated) DEVICE — LINEN TOWEL PACK X5 5464

## (undated) DEVICE — CABLE MULTI-LEAD TRIAL 3014ANS

## (undated) DEVICE — SUTURE BOOTS 051003PBX

## (undated) DEVICE — ADH SKIN CLOSURE PREMIERPRO EXOFIN 1.0ML 3470

## (undated) DEVICE — CABLE 5 CHANNEL INPUT  ALPHA OMEGA SYSTEM STR-000642-55

## (undated) DEVICE — SU SILK 2-0 TIE 12X30" A305H

## (undated) DEVICE — SUCTION MANIFOLD NEPTUNE 2 SYS 4 PORT 0702-020-000

## (undated) DEVICE — PREP POVIDONE IODINE SCRUB 7.5% 4OZ APL82212

## (undated) DEVICE — NDL 25GA 2"  8881200441

## (undated) DEVICE — COMB STERILE 7" PLASTIC 14-1200

## (undated) DEVICE — WIPES FOLEY CARE SURESTEP PROVON DFC100

## (undated) DEVICE — TOOL INSERTION LEAD/EXTENSION 1803ANS

## (undated) DEVICE — DRSG GAUZE 4X4" TRAY 6939

## (undated) DEVICE — PAD CHUX UNDERPAD 23X24" 7136

## (undated) DEVICE — NDL BLUNT 18GA 1" W/O FILTER 305181

## (undated) DEVICE — GLOVE PROTEXIS BLUE W/NEU-THERA 8.0  2D73EB80

## (undated) DEVICE — DECANTER VIAL 2006S

## (undated) DEVICE — STPL SKIN 35W ROTATING HEAD PRW35

## (undated) DEVICE — TUBE GUIDE ALPHA OMEGA SYSTEM STR-007721-00

## (undated) DEVICE — DRAPE C-ARM W/STRAPS 42X72" 07-CA104

## (undated) DEVICE — PREP POVIDONE IODINE SOLUTION 10% 4OZ

## (undated) DEVICE — DRAPE MAYO STAND 23X54 8337

## (undated) DEVICE — DRAPE IOBAN ISOLATION VERTICAL 320X21CM 6617

## (undated) DEVICE — BLADE CLIPPER DISP 4406

## (undated) DEVICE — ESU GROUND PAD ADULT W/CORD E7507

## (undated) DEVICE — GLOVE PROTEXIS MICRO 7.5  2D73PM75

## (undated) DEVICE — ESU GROUND PAD ADULT REM W/15' CORD E7507DB

## (undated) DEVICE — INFINITY PATIENT MANUAL AND MAGNET

## (undated) DEVICE — SU VICRYL 3-0 CT-1 36" J344H

## (undated) RX ORDER — PHENYLEPHRINE HCL IN 0.9% NACL 1 MG/10 ML
SYRINGE (ML) INTRAVENOUS
Status: DISPENSED
Start: 2019-07-11

## (undated) RX ORDER — HYDROMORPHONE HYDROCHLORIDE 1 MG/ML
INJECTION, SOLUTION INTRAMUSCULAR; INTRAVENOUS; SUBCUTANEOUS
Status: DISPENSED
Start: 2019-07-09

## (undated) RX ORDER — LIDOCAINE HYDROCHLORIDE 20 MG/ML
INJECTION, SOLUTION EPIDURAL; INFILTRATION; INTRACAUDAL; PERINEURAL
Status: DISPENSED
Start: 2019-07-09

## (undated) RX ORDER — BUPIVACAINE HYDROCHLORIDE 2.5 MG/ML
INJECTION, SOLUTION EPIDURAL; INFILTRATION; INTRACAUDAL
Status: DISPENSED
Start: 2024-10-31

## (undated) RX ORDER — FENTANYL CITRATE 50 UG/ML
INJECTION, SOLUTION INTRAMUSCULAR; INTRAVENOUS
Status: DISPENSED
Start: 2024-10-31

## (undated) RX ORDER — ACETAMINOPHEN 325 MG/1
TABLET ORAL
Status: DISPENSED
Start: 2024-10-31

## (undated) RX ORDER — LIDOCAINE HYDROCHLORIDE AND EPINEPHRINE 10; 10 MG/ML; UG/ML
INJECTION, SOLUTION INFILTRATION; PERINEURAL
Status: DISPENSED
Start: 2019-07-11

## (undated) RX ORDER — ONDANSETRON 2 MG/ML
INJECTION INTRAMUSCULAR; INTRAVENOUS
Status: DISPENSED
Start: 2019-07-11

## (undated) RX ORDER — PROPOFOL 10 MG/ML
INJECTION, EMULSION INTRAVENOUS
Status: DISPENSED
Start: 2019-07-09

## (undated) RX ORDER — FENTANYL CITRATE 50 UG/ML
INJECTION, SOLUTION INTRAMUSCULAR; INTRAVENOUS
Status: DISPENSED
Start: 2019-07-11

## (undated) RX ORDER — BUPIVACAINE HYDROCHLORIDE 2.5 MG/ML
INJECTION, SOLUTION EPIDURAL; INFILTRATION; INTRACAUDAL
Status: DISPENSED
Start: 2019-07-09

## (undated) RX ORDER — LIDOCAINE HYDROCHLORIDE 20 MG/ML
INJECTION, SOLUTION EPIDURAL; INFILTRATION; INTRACAUDAL; PERINEURAL
Status: DISPENSED
Start: 2019-07-11

## (undated) RX ORDER — ONDANSETRON 2 MG/ML
INJECTION INTRAMUSCULAR; INTRAVENOUS
Status: DISPENSED
Start: 2019-07-09

## (undated) RX ORDER — LIDOCAINE HYDROCHLORIDE AND EPINEPHRINE 10; 10 MG/ML; UG/ML
INJECTION, SOLUTION INFILTRATION; PERINEURAL
Status: DISPENSED
Start: 2019-07-09

## (undated) RX ORDER — FENTANYL CITRATE-0.9 % NACL/PF 10 MCG/ML
PLASTIC BAG, INJECTION (ML) INTRAVENOUS
Status: DISPENSED
Start: 2024-10-31

## (undated) RX ORDER — BUPIVACAINE HYDROCHLORIDE 2.5 MG/ML
INJECTION, SOLUTION EPIDURAL; INFILTRATION; INTRACAUDAL
Status: DISPENSED
Start: 2019-07-11

## (undated) RX ORDER — LIDOCAINE HYDROCHLORIDE 20 MG/ML
JELLY TOPICAL
Status: DISPENSED
Start: 2019-07-09

## (undated) RX ORDER — DEXAMETHASONE SODIUM PHOSPHATE 4 MG/ML
INJECTION, SOLUTION INTRA-ARTICULAR; INTRALESIONAL; INTRAMUSCULAR; INTRAVENOUS; SOFT TISSUE
Status: DISPENSED
Start: 2019-07-11

## (undated) RX ORDER — FENTANYL CITRATE 50 UG/ML
INJECTION, SOLUTION INTRAMUSCULAR; INTRAVENOUS
Status: DISPENSED
Start: 2019-07-09

## (undated) RX ORDER — LABETALOL HYDROCHLORIDE 5 MG/ML
INJECTION, SOLUTION INTRAVENOUS
Status: DISPENSED
Start: 2019-07-09

## (undated) RX ORDER — LIDOCAINE HYDROCHLORIDE AND EPINEPHRINE 10; 10 MG/ML; UG/ML
INJECTION, SOLUTION INFILTRATION; PERINEURAL
Status: DISPENSED
Start: 2024-10-31

## (undated) RX ORDER — CEFAZOLIN SODIUM/WATER 2 G/20 ML
SYRINGE (ML) INTRAVENOUS
Status: DISPENSED
Start: 2024-10-31

## (undated) RX ORDER — PROPOFOL 10 MG/ML
INJECTION, EMULSION INTRAVENOUS
Status: DISPENSED
Start: 2019-07-11

## (undated) RX ORDER — SODIUM CHLORIDE 9 MG/ML
INJECTION, SOLUTION INTRAVENOUS
Status: DISPENSED
Start: 2019-07-09

## (undated) RX ORDER — BACITRACIN 50000 [IU]/1
INJECTION, POWDER, FOR SOLUTION INTRAMUSCULAR
Status: DISPENSED
Start: 2019-07-11

## (undated) RX ORDER — CLINDAMYCIN PHOSPHATE 900 MG/50ML
INJECTION, SOLUTION INTRAVENOUS
Status: DISPENSED
Start: 2019-07-11

## (undated) RX ORDER — EPHEDRINE SULFATE 50 MG/ML
INJECTION, SOLUTION INTRAMUSCULAR; INTRAVENOUS; SUBCUTANEOUS
Status: DISPENSED
Start: 2019-07-11

## (undated) RX ORDER — DEXAMETHASONE SODIUM PHOSPHATE 4 MG/ML
INJECTION, SOLUTION INTRA-ARTICULAR; INTRALESIONAL; INTRAMUSCULAR; INTRAVENOUS; SOFT TISSUE
Status: DISPENSED
Start: 2024-10-31